# Patient Record
Sex: FEMALE | Race: BLACK OR AFRICAN AMERICAN | NOT HISPANIC OR LATINO | URBAN - METROPOLITAN AREA
[De-identification: names, ages, dates, MRNs, and addresses within clinical notes are randomized per-mention and may not be internally consistent; named-entity substitution may affect disease eponyms.]

---

## 2018-12-13 ENCOUNTER — OFFICE VISIT CONVERTED (OUTPATIENT)
Dept: INTERNAL MEDICINE | Facility: CLINIC | Age: 38
End: 2018-12-13
Attending: NURSE PRACTITIONER

## 2019-02-26 ENCOUNTER — OFFICE VISIT CONVERTED (OUTPATIENT)
Dept: INTERNAL MEDICINE | Facility: CLINIC | Age: 39
End: 2019-02-26
Attending: NURSE PRACTITIONER

## 2019-02-26 ENCOUNTER — CONVERSION ENCOUNTER (OUTPATIENT)
Dept: INTERNAL MEDICINE | Facility: CLINIC | Age: 39
End: 2019-02-26

## 2019-04-11 ENCOUNTER — OFFICE VISIT CONVERTED (OUTPATIENT)
Dept: INTERNAL MEDICINE | Facility: CLINIC | Age: 39
End: 2019-04-11
Attending: NURSE PRACTITIONER

## 2019-04-11 ENCOUNTER — HOSPITAL ENCOUNTER (OUTPATIENT)
Dept: OTHER | Facility: HOSPITAL | Age: 39
Discharge: HOME | End: 2019-04-11
Attending: NURSE PRACTITIONER

## 2019-04-11 LAB
C TRACH RRNA CVX QL NAA+PROBE: NOT DETECTED
N GONORRHOEA DNA SPEC QL NAA+PROBE: NOT DETECTED

## 2019-04-15 LAB
CONV LAST MENSTURAL PERIOD: NORMAL
SPECIMEN SOURCE: NORMAL
SPECIMEN SOURCE: NORMAL
THIN PREP CVX: NORMAL

## 2019-10-07 ENCOUNTER — HOSPITAL ENCOUNTER (OUTPATIENT)
Dept: URGENT CARE | Facility: CLINIC | Age: 39
Discharge: HOME | End: 2019-10-07

## 2019-10-10 ENCOUNTER — OFFICE VISIT CONVERTED (OUTPATIENT)
Dept: INTERNAL MEDICINE | Facility: CLINIC | Age: 39
End: 2019-10-10
Attending: NURSE PRACTITIONER

## 2019-10-24 ENCOUNTER — HOSPITAL ENCOUNTER (OUTPATIENT)
Dept: MAMMOGRAPHY | Facility: HOSPITAL | Age: 39
Discharge: HOME | End: 2019-10-24
Attending: NURSE PRACTITIONER

## 2020-04-08 ENCOUNTER — HOSPITAL ENCOUNTER (OUTPATIENT)
Dept: URGENT CARE | Facility: CLINIC | Age: 40
Discharge: HOME | End: 2020-04-08

## 2020-04-11 LAB — BACTERIA SPEC AEROBE CULT: ABNORMAL

## 2020-04-30 ENCOUNTER — HOSPITAL ENCOUNTER (OUTPATIENT)
Dept: URGENT CARE | Facility: CLINIC | Age: 40
Discharge: HOME | End: 2020-04-30
Attending: NURSE PRACTITIONER

## 2020-06-17 ENCOUNTER — HOSPITAL ENCOUNTER (OUTPATIENT)
Dept: OTHER | Facility: HOSPITAL | Age: 40
Discharge: HOME | End: 2020-06-17

## 2020-06-17 LAB
ALBUMIN SERPL-MCNC: 4.3 G/DL (ref 3.5–5)
ALBUMIN/GLOB SERPL: 1.3 {RATIO} (ref 1.4–2.6)
ALP SERPL-CCNC: 53 U/L (ref 42–98)
ALT SERPL-CCNC: 12 U/L (ref 10–40)
ANION GAP SERPL CALC-SCNC: 14 MMOL/L (ref 8–19)
AST SERPL-CCNC: 17 U/L (ref 15–50)
BASOPHILS # BLD AUTO: 0.04 10*3/UL (ref 0–0.2)
BASOPHILS NFR BLD AUTO: 0.4 % (ref 0–3)
BILIRUB SERPL-MCNC: 0.29 MG/DL (ref 0.2–1.3)
BUN SERPL-MCNC: 9 MG/DL (ref 5–25)
BUN/CREAT SERPL: 10 {RATIO} (ref 6–20)
CALCIUM SERPL-MCNC: 9.4 MG/DL (ref 8.7–10.4)
CHLORIDE SERPL-SCNC: 107 MMOL/L (ref 99–111)
CHOLEST SERPL-MCNC: 140 MG/DL (ref 107–200)
CHOLEST/HDLC SERPL: 2.7 {RATIO} (ref 3–6)
CONV ABS IMM GRAN: 0.02 10*3/UL (ref 0–0.2)
CONV CO2: 22 MMOL/L (ref 22–32)
CONV IMMATURE GRAN: 0.2 % (ref 0–1.8)
CONV TOTAL PROTEIN: 7.7 G/DL (ref 6.3–8.2)
CREAT UR-MCNC: 0.88 MG/DL (ref 0.5–0.9)
DEPRECATED RDW RBC AUTO: 41.7 FL (ref 36.4–46.3)
EOSINOPHIL # BLD AUTO: 0.07 10*3/UL (ref 0–0.7)
EOSINOPHIL # BLD AUTO: 0.8 % (ref 0–7)
ERYTHROCYTE [DISTWIDTH] IN BLOOD BY AUTOMATED COUNT: 12.6 % (ref 11.7–14.4)
GFR SERPLBLD BASED ON 1.73 SQ M-ARVRAT: >60 ML/MIN/{1.73_M2}
GLOBULIN UR ELPH-MCNC: 3.4 G/DL (ref 2–3.5)
GLUCOSE SERPL-MCNC: 80 MG/DL (ref 65–99)
HCT VFR BLD AUTO: 38.5 % (ref 37–47)
HDLC SERPL-MCNC: 52 MG/DL (ref 40–60)
HGB BLD-MCNC: 12.5 G/DL (ref 12–16)
LDLC SERPL CALC-MCNC: 76 MG/DL (ref 70–100)
LITHIUM SERPL-SCNC: 0.3 MEQ/L (ref 0.5–1.5)
LYMPHOCYTES # BLD AUTO: 2.11 10*3/UL (ref 1–5)
LYMPHOCYTES NFR BLD AUTO: 22.8 % (ref 20–45)
MCH RBC QN AUTO: 29.5 PG (ref 27–31)
MCHC RBC AUTO-ENTMCNC: 32.5 G/DL (ref 33–37)
MCV RBC AUTO: 90.8 FL (ref 81–99)
MONOCYTES # BLD AUTO: 0.45 10*3/UL (ref 0.2–1.2)
MONOCYTES NFR BLD AUTO: 4.9 % (ref 3–10)
NEUTROPHILS # BLD AUTO: 6.56 10*3/UL (ref 2–8)
NEUTROPHILS NFR BLD AUTO: 70.9 % (ref 30–85)
NRBC CBCN: 0 % (ref 0–0.7)
OSMOLALITY SERPL CALC.SUM OF ELEC: 286 MOSM/KG (ref 273–304)
PLATELET # BLD AUTO: 260 10*3/UL (ref 130–400)
PMV BLD AUTO: 10.2 FL (ref 9.4–12.3)
POTASSIUM SERPL-SCNC: 3.7 MMOL/L (ref 3.5–5.3)
RBC # BLD AUTO: 4.24 10*6/UL (ref 4.2–5.4)
SODIUM SERPL-SCNC: 139 MMOL/L (ref 135–147)
TRIGL SERPL-MCNC: 61 MG/DL (ref 40–150)
TSH SERPL-ACNC: 1.24 M[IU]/L (ref 0.27–4.2)
VLDLC SERPL-MCNC: 12 MG/DL (ref 5–37)
WBC # BLD AUTO: 9.25 10*3/UL (ref 4.8–10.8)

## 2020-07-02 ENCOUNTER — OFFICE VISIT CONVERTED (OUTPATIENT)
Dept: INTERNAL MEDICINE | Facility: CLINIC | Age: 40
End: 2020-07-02
Attending: PHYSICIAN ASSISTANT

## 2020-07-02 ENCOUNTER — CONVERSION ENCOUNTER (OUTPATIENT)
Dept: INTERNAL MEDICINE | Facility: CLINIC | Age: 40
End: 2020-07-02

## 2020-08-10 ENCOUNTER — HOSPITAL ENCOUNTER (OUTPATIENT)
Dept: OTHER | Facility: HOSPITAL | Age: 40
Discharge: HOME | End: 2020-08-10
Attending: PHYSICIAN ASSISTANT

## 2020-09-15 ENCOUNTER — CONVERSION ENCOUNTER (OUTPATIENT)
Dept: INTERNAL MEDICINE | Facility: CLINIC | Age: 40
End: 2020-09-15

## 2020-09-15 ENCOUNTER — HOSPITAL ENCOUNTER (OUTPATIENT)
Dept: OTHER | Facility: HOSPITAL | Age: 40
Discharge: HOME | End: 2020-09-15
Attending: NURSE PRACTITIONER

## 2020-09-15 ENCOUNTER — OFFICE VISIT CONVERTED (OUTPATIENT)
Dept: INTERNAL MEDICINE | Facility: CLINIC | Age: 40
End: 2020-09-15
Attending: NURSE PRACTITIONER

## 2020-10-09 ENCOUNTER — CONVERSION ENCOUNTER (OUTPATIENT)
Dept: INTERNAL MEDICINE | Facility: CLINIC | Age: 40
End: 2020-10-09

## 2020-10-09 ENCOUNTER — OFFICE VISIT CONVERTED (OUTPATIENT)
Dept: INTERNAL MEDICINE | Facility: CLINIC | Age: 40
End: 2020-10-09
Attending: NURSE PRACTITIONER

## 2020-12-10 ENCOUNTER — HOSPITAL ENCOUNTER (OUTPATIENT)
Dept: URGENT CARE | Facility: CLINIC | Age: 40
Discharge: HOME | End: 2020-12-10
Attending: NURSE PRACTITIONER

## 2020-12-11 ENCOUNTER — HOSPITAL ENCOUNTER (OUTPATIENT)
Dept: MAMMOGRAPHY | Facility: HOSPITAL | Age: 40
Discharge: HOME | End: 2020-12-11
Attending: NURSE PRACTITIONER

## 2021-03-17 ENCOUNTER — HOSPITAL ENCOUNTER (OUTPATIENT)
Dept: OTHER | Facility: HOSPITAL | Age: 41
Discharge: HOME | End: 2021-03-17

## 2021-03-17 LAB
ALBUMIN SERPL-MCNC: 3.9 G/DL (ref 3.5–5)
ALBUMIN/GLOB SERPL: 1.2 {RATIO} (ref 1.4–2.6)
ALP SERPL-CCNC: 53 U/L (ref 42–98)
ALT SERPL-CCNC: 12 U/L (ref 10–40)
ANION GAP SERPL CALC-SCNC: 11 MMOL/L (ref 8–19)
AST SERPL-CCNC: 15 U/L (ref 15–50)
BASOPHILS # BLD AUTO: 0.04 10*3/UL (ref 0–0.2)
BASOPHILS NFR BLD AUTO: 0.6 % (ref 0–3)
BILIRUB SERPL-MCNC: 0.2 MG/DL (ref 0.2–1.3)
BUN SERPL-MCNC: 11 MG/DL (ref 5–25)
BUN/CREAT SERPL: 12 {RATIO} (ref 6–20)
CALCIUM SERPL-MCNC: 9.2 MG/DL (ref 8.7–10.4)
CHLORIDE SERPL-SCNC: 106 MMOL/L (ref 99–111)
CHOLEST SERPL-MCNC: 141 MG/DL (ref 107–200)
CHOLEST/HDLC SERPL: 2.7 {RATIO} (ref 3–6)
CONV ABS IMM GRAN: 0.02 10*3/UL (ref 0–0.2)
CONV CO2: 27 MMOL/L (ref 22–32)
CONV IMMATURE GRAN: 0.3 % (ref 0–1.8)
CONV TOTAL PROTEIN: 7.2 G/DL (ref 6.3–8.2)
CREAT UR-MCNC: 0.89 MG/DL (ref 0.5–0.9)
DEPRECATED RDW RBC AUTO: 43.9 FL (ref 36.4–46.3)
EOSINOPHIL # BLD AUTO: 0.07 10*3/UL (ref 0–0.7)
EOSINOPHIL # BLD AUTO: 1.1 % (ref 0–7)
ERYTHROCYTE [DISTWIDTH] IN BLOOD BY AUTOMATED COUNT: 13.1 % (ref 11.7–14.4)
GFR SERPLBLD BASED ON 1.73 SQ M-ARVRAT: >60 ML/MIN/{1.73_M2}
GLOBULIN UR ELPH-MCNC: 3.3 G/DL (ref 2–3.5)
GLUCOSE SERPL-MCNC: 88 MG/DL (ref 65–99)
HCT VFR BLD AUTO: 39.4 % (ref 37–47)
HDLC SERPL-MCNC: 52 MG/DL (ref 40–60)
HGB BLD-MCNC: 12.6 G/DL (ref 12–16)
LDLC SERPL CALC-MCNC: 72 MG/DL (ref 70–100)
LITHIUM SERPL-SCNC: 0.2 MEQ/L (ref 0.5–1.5)
LYMPHOCYTES # BLD AUTO: 1.98 10*3/UL (ref 1–5)
LYMPHOCYTES NFR BLD AUTO: 30.3 % (ref 20–45)
MCH RBC QN AUTO: 29.6 PG (ref 27–31)
MCHC RBC AUTO-ENTMCNC: 32 G/DL (ref 33–37)
MCV RBC AUTO: 92.5 FL (ref 81–99)
MONOCYTES # BLD AUTO: 0.63 10*3/UL (ref 0.2–1.2)
MONOCYTES NFR BLD AUTO: 9.6 % (ref 3–10)
NEUTROPHILS # BLD AUTO: 3.8 10*3/UL (ref 2–8)
NEUTROPHILS NFR BLD AUTO: 58.1 % (ref 30–85)
NRBC CBCN: 0 % (ref 0–0.7)
OSMOLALITY SERPL CALC.SUM OF ELEC: 289 MOSM/KG (ref 273–304)
PLATELET # BLD AUTO: 269 10*3/UL (ref 130–400)
PMV BLD AUTO: 9.7 FL (ref 9.4–12.3)
POTASSIUM SERPL-SCNC: 3.9 MMOL/L (ref 3.5–5.3)
RBC # BLD AUTO: 4.26 10*6/UL (ref 4.2–5.4)
SODIUM SERPL-SCNC: 140 MMOL/L (ref 135–147)
TRIGL SERPL-MCNC: 83 MG/DL (ref 40–150)
TSH SERPL-ACNC: 1.27 M[IU]/L (ref 0.27–4.2)
VLDLC SERPL-MCNC: 17 MG/DL (ref 5–37)
WBC # BLD AUTO: 6.54 10*3/UL (ref 4.8–10.8)

## 2021-05-13 NOTE — PROGRESS NOTES
Progress Note      Patient Name: Cruz Nichols   Patient ID: [id number]   Sex: Female   YOB: 1980    Primary Care Provider: Ifeoma VALENCIA    Visit Date: July 2, 2020    Provider: Elizabeth Doyle PA-C   Location: [facility]   Location Address: [address] [place] [address], [facility] [address]  [place], [place]  [id number]   Location Phone: [phone]          Chief Complaint  · ER follow up       History Of Present Illness  Cruz Nichols is a 39 year old /Black female who presents for evaluation and treatment of:      Pt here for ER follow up. Pt went to ER 6/30 for abd pain on R side. CT abd/pelvis WNL- showed tubes tied and gallbladder surgically absent. UA showing UTI. WBC 11.3H. DX with pyelonephritis. Rx Keflex, pyridium, Mobic, Zofran.   Denies dysuria, frequency, hematuria.   She is still having pain on R side. It has improved some but still there.   Denies fever, vomiting, diarrhea, constipation.  She has increased water intake.       Past Medical History  Disease Name Date Onset Notes   Allergic rhinitis --  --    Anxiety disorder --  --    Bipolar Disorder --  --    Depressive Disorder --  --    Migraine Headaches --  --    Urge Incontinence --  --    Urinary Incontinence --  --          Past Surgical History  Procedure Name Date Notes   Cholecystectomy --  --    Ovarian cyst drainage --  --    Tubal ligation 2015 --          Medication List  Name Date Started Instructions   bupropion HCl 75 mg oral tablet  take 3 tablets by oral route daily   buspirone 5 mg oral tablet  take 1 tablet (5 mg) by oral route 3 times per day   cephalexin 500 mg oral capsule  take 1 capsule (500 mg) by oral route every 6 hours   lithium carbonate 300 mg oral tablet  take 1 tablet (300 mg) by oral route 2 times per day   meloxicam 7.5 mg oral tablet  take 1 tablet (7.5 mg) by oral route once daily   ondansetron HCl 4 mg oral tablet  take 1 tablet by oral route 4 times per day  "  oxybutynin chloride 10 mg oral tablet extended release 24hr 2020 TAKE 1 TABLET BY MOUTH ONCE DAILY         Allergy List  Allergen Name Date Reaction Notes   Codiene --  --  --        Allergies Reconciled  Reproductive History  Menstrual   Last Menstrual Period: 2013 Method of Birth Control: OCPs   Pregnancy Summary   Total Pregnancies: 2 Full Term: 2 Premature: 0   Ab Induced: 0 Ab Spontaneous: 0 Ectopics: 0   Multiples: 0 Livin         Social History  Finding Status Start/Stop Quantity Notes   Alcohol Current some day --/-- 1 glass wine --    Tobacco Never --/-- --  --          Immunizations  NameDate Admin Mfg Trade Name Lot Number Route Inj VIS Given VIS Publication   Sxjhwmqeh13/08/2014 NOV Fluvirin > 4 Years [id number] IM LD 2014   Comments: pt left office in stable condition         Review of Systems  · Constitutional  o Denies  o : fever, fatigue, weight loss, weight gain  · Cardiovascular  o Denies  o : lower extremity edema, claudication, chest pressure, palpitations  · Respiratory  o Denies  o : shortness of breath, wheezing, frequent cough, hemoptysis, dyspnea on exertion  · Gastrointestinal  o Admits  o : abdominal pain  o Denies  o : nausea, vomiting, diarrhea, constipation  · Genitourinary  o Denies  o : urinary urgency, urinary frequency, dysuria, hematuria      Vitals  Date Time BP Position Site L\R Cuff Size HR RR TEMP (F) WT  HT  BMI kg/m2 BSA m2 O2 Sat        2019 08:38 /72 Sitting    95 - R 12 97.7 200lbs 6oz 5'  11\" 27.95 2.13 99 %    10/10/2019 08:18 /84 Sitting    107 - R 12 97.5 204lbs 8oz 5'  11\" 28.52 2.16 98 %    2020 08:13 /74 Sitting    103 - R 15 98.3 195lbs 16oz 5'  11\" 27.34 2.11 96 %          Physical Examination  · Constitutional  o Appearance  o : no acute distress, well-nourished  · Head and Face  o Head  o :   § Inspection  § : atraumatic, normocephalic  · Eyes  o Eyes  o : extraocular movements intact, no scleral " icterus, no conjunctival injection  · Ears, Nose, Mouth and Throat  o Ears  o :   § External Ears  § : normal  o Nose  o :   § Intranasal Exam  § : nares patent  o Oral Cavity  o :   § Oral Mucosa  § : moist mucous membranes  · Respiratory  o Respiratory Effort  o : breathing comfortably, symmetric chest rise  o Auscultation of Lungs  o : clear to asculatation bilaterally, no wheezes, rales, or rhonchii  · Cardiovascular  o Heart  o :   § Auscultation of Heart  § : regular rate and rhythm, no murmurs, rubs, or gallops  o Peripheral Vascular System  o :   § Extremities  § : no edema  · Gastrointestinal  o Abdominal Examination  o :   § Abdomen  § : bowel sounds present, non-distended, non-tender  · Skin and Subcutaneous Tissue  o General Inspection  o : no lesions present, no areas of discoloration, skin turgor normal  · Neurologic  o Mental Status Examination  o :   § Orientation  § : grossly oriented to person, place and time  o Gait and Station  o :   § Gait Screening  § : normal gait  · Psychiatric  o General  o : normal mood and affect          Assessment  · Pyelonephritis     590.80/N12  reviewed ER note, labs, and ct. Discussed kidney infection. Urine cx showing ecoli with pending sensitivity. Will change Keflex to Bactrim today. Increase water intake. Monitor for worsening sx, profuse vomiting, fever, increased pain. Pt understands and agrees      Plan  · Orders  o ACO-39: Current medications updated and reviewed () - - 07/02/2020  · Medications  o Bactrim -160 mg oral tablet   SIG: take 1 tablet by oral route every 12 hours for 7 days   DISP: (14) tablets with 0 refills  Prescribed on 07/02/2020     o Medications have been Reconciled  o Transition of Care or Provider Policy  · Instructions  o Patient was educated/instructed on their diagnosis, treatment and medications prior to discharge from the clinic today.  · Disposition  o Call or Return if symptoms worsen or persist.  o Keep follow up appt  as scheduled            Electronically Signed by: Elizabeth Doyle PA-C -Author on July 2, 2020 08:28:00 AM  Electronically Co-signed by: Nicolette Tejada MD -Reviewer on July 2, 2020 08:53:02 AM

## 2021-05-13 NOTE — PROGRESS NOTES
Progress Note      Patient Name: Cruz Nichols   Patient ID: [id number]   Sex: Female   YOB: 1980    Primary Care Provider: Ifeoma VALENCIA    Visit Date: September 15, 2020    Provider: ERIK Flores   Location: [facility]   Location Address: [address] [place] [address], [facility] [address]  [place], [place]  [id number]   Location Phone: [phone]          Chief Complaint  · Annual Exam  · PAP exam  · (Health Maintainence Information Reviewed Under Results)      History Of Present Illness  Last PAP Smear: 04/2019.   Date of Last Mammogram: 10/2019.   Date of Last Colonoscopy: n/a   No current complaints.   Cruz Nichols is a 40 year old /Black female who presents for evaluation and treatment of:      She is concerned about having a UTI. she reports urinary frequency x2 days. she has been drinking more water in that time as well. she denies dysuria, flank pain, back pain, fever, chills    No pap concerns.   Mammo 10/19         Past Medical History  Disease Name Date Onset Notes   Allergic rhinitis --  --    Anxiety disorder --  --    Bipolar Disorder --  --    Depressive Disorder --  --    Migraine Headaches --  --    Urge Incontinence --  --    Urinary Incontinence --  --          Past Surgical History  Procedure Name Date Notes   Cholecystectomy --  --    Ovarian cyst drainage --  --    Tubal ligation 2015 --          Medication List  Name Date Started Instructions   Abilify 15 mg oral tablet  take 1 tablet (15 mg) by oral route once daily   bupropion HCl 75 mg oral tablet  take 3 tablets by oral route daily   buspirone 5 mg oral tablet  take 1 tablet (5 mg) by oral route 3 times per day   lithium carbonate 300 mg oral tablet  take 1 tablet (300 mg) by oral route 2 times per day   oxybutynin chloride 10 mg oral tablet extended release 24hr 05/28/2020 TAKE 1 TABLET BY MOUTH ONCE DAILY         Allergy List  Allergen Name Date Reaction Notes   Codiene --  --  --   "      Allergies Reconciled  Reproductive History  Menstrual   Last Menstrual Period: 2013 Method of Birth Control: OCPs   Pregnancy Summary   Total Pregnancies: 2 Full Term: 2 Premature: 0   Ab Induced: 0 Ab Spontaneous: 0 Ectopics: 0   Multiples: 0 Livin         Social History  Finding Status Start/Stop Quantity Notes   Alcohol Current some day --/-- 1 glass wine --    Tobacco Never --/-- --  --          Immunizations  NameDate Admin Mfg Trade Name Lot Number Route Inj VIS Given VIS Publication   Eegcmzvrh23/08/2014 NOV Fluvirin > 4 Years [id number] IM LD 2014   Comments: pt left office in stable condition         Review of Systems  · Constitutional  o Denies  o : appetite change, fatigue, night sweats, weight gain, weight loss  · HENT  o Denies  o : hearing loss, tinnitus, vertigo, nasal discharge, nose bleeding, dental problems, oral lesions, sore throat  · Breasts  o Denies  o : lumps, tenderness, swelling, nipple discharge  · Cardiovascular  o Denies  o : chest pain, decreased exercise tolerance, dyspnea on exertion, palpitations  · Respiratory  o Denies  o : cough, shortness of breath, wheezing, snoring, apneas  · Gastrointestinal  o Denies  o : abdominal pain, nausea, vomiting, dysphagia, heartburn, changes in bowel habits, constipation, diarrhea  · Genitourinary  o Admits  o : frequency  o Denies  o : dysuria, hematuria, incontinence, nocturia, urgency, sexual dysfunction  · Neurologic  o Denies  o : abnormal gait, facial weakness, headache, memory difficulties, tingling or numbness, seizures, tremors  · Psychiatric  o Denies  o : anxiety, decreased concentration, irritability, panic attacks, sleep distrubances, sadness/tearfulness      Vitals  Date Time BP Position Site L\R Cuff Size HR RR TEMP (F) WT  HT  BMI kg/m2 BSA m2 O2 Sat HC       10/10/2019 08:18 /84 Sitting    107 - R 12 97.5 204lbs 8oz 5'  11\" 28.52 2.16 98 %    2020 08:13 /74 Sitting    103 - R 15 98.3 " "[facility] 16oz 5'  11\" 27.34 2.11 96 %    09/15/2020 11:20 /74 Sitting    87 - R 18 98.4 191lbs 4oz 5'  11\" 26.67 2.08 98 %          Physical Examination  · Constitutional  o Appearance  o : well-nourished, in no acute distress  · Neck  o Inspection/Palpation  o : normal appearance, no masses or tenderness, trachea midline  o Thyroid  o : gland size normal, nontender, no nodules or masses present on palpation  · Respiratory  o Respiratory Effort  o : breathing unlabored  o Inspection of Chest  o : normal appearance  o Auscultation of Lungs  o : normal breath sounds throughout  · Cardiovascular  o Heart  o :   § Auscultation of Heart  § : regular rate and rhythm, no murmurs, gallops or rubs  · Breasts  o Inspection of Breasts  o : breasts symmetrical, no skin changes, no deformities present, no discharge present  o Palpation of Breasts, Axillae  o : no masses present on palpation, no breast tenderness  · Gastrointestinal  o Abdominal Examination  o : abdomen nontender to palpation, tone normal without rigidity or guarding, no masses present, normal bowel sounds  · Genitourinary  o External Genitalia  o : no inflammation, no lesions present  o Vagina  o : normal vaginal vault, no discharge present, no inflammatory lesions present, no masses present  o Bladder  o : nontender to palpation  o Cervix  o : appearance healthy, no lesions present, nontender to palpation, no discharges, no bleeding present, normal midline position  o Uterus  o : nontender to palpation, no masses present, position midline/midplane  o Adnexa  o : no tenderness or masses present on bimanual examination  o Anus  o : no inflammation or lesions present  o Perineum  o : perineum within normal limits  · Lymphatic  o Neck  o : no lymphadenopathy present  o Axilla  o : no lymphadenopathy present  o Groin  o : no lymphadenopathy present  · Neurologic  o Mental Status Examination  o :   § Orientation  § : grossly oriented to person, place and " time  o Gait and Station  o : normal gait, able to stand without difficulty  · Psychiatric  o Judgement and Insight  o : judgment and insight intact  o Mood and Affect  o : mood normal, affect appropriate     Integumentsmall circular patch at 6:00 on left breast with scaling it outside border           Results  · In-Office Procedures  o Lab procedure  § IOP - Urinalysis without Microscopy (Clinitek) [facility] (04897)   § Color Ur: Yellow   § Clarity Ur: Clear   § Glucose Ur Ql Strip: Negative   § Bilirub Ur Ql Strip: Negative   § Ketones Ur Ql Strip: Negative   § Sp Gr Ur Qn: 1.015   § Hgb Ur Ql Strip: Negative   § pH Ur-LsCnc: 8.5   § Prot Ur Ql Strip: Negative   § Urobilinogen Ur Strip-mCnc: 0.2 E.U./dL   § Nitrite Ur Ql Strip: Negative   § WBC Est Ur Ql Strip: Negative       Assessment  · Routine gynecological examination     V72.31/Z01.419  · Visit for screening mammogram     V76.12/Z12.31  · Tinea corporis     110.5/B35.4  We will treat with clotrimazole topically x10 to 14 days. Patient will call if this is not improving or does not resolve    Problems Reconciled  Plan  · Orders  o Screening Mammography; Bilateral 2D (, 90239) - V76.12/Z12.31 - 10/26/2020  o ACO-39: Current medications updated and reviewed () - - 09/15/2020  o Thin prep Papanicolaou smear (52309) - V72.31/Z01.419 - 09/15/2020  · Medications  o clotrimazole 1 % topical cream   SIG: apply to the affected and surrounding areas of skin by topical route 2 times per day in the morning and evening for 14 days   DISP: (1) 14 gm tube with 0 refills  Prescribed on 09/15/2020     o Medications have been Reconciled  o Transition of Care or Provider Policy  · Instructions  o Counseled on monthly breast self exams.   o Counseled on STD prevention.  o Counseled on diet and exercise.   o Counseled on weight-bearing exercise.  o Recommended Calcium with Vitamin D twice daily.  o Used cytobrush to obtain Pap smear specimen. Sent to pathology for testing and  review.  o Patient was educated/instructed on their diagnosis, treatment and medications prior to discharge from the clinic today.  · Disposition  o Call or Return if symptoms worsen or persist.  o Prescriptions sent electronically  o Prescription printed and given to patient            Electronically Signed by: ERIK Flores -Author on September 15, 2020 12:20:03 PM
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
## 2021-05-14 VITALS
HEIGHT: 71 IN | DIASTOLIC BLOOD PRESSURE: 74 MMHG | RESPIRATION RATE: 18 BRPM | OXYGEN SATURATION: 98 % | TEMPERATURE: 98.4 F | WEIGHT: 191.25 LBS | SYSTOLIC BLOOD PRESSURE: 118 MMHG | HEART RATE: 87 BPM | BODY MASS INDEX: 26.77 KG/M2

## 2021-05-14 VITALS
HEART RATE: 90 BPM | RESPIRATION RATE: 18 BRPM | TEMPERATURE: 98.7 F | HEIGHT: 71 IN | OXYGEN SATURATION: 97 % | DIASTOLIC BLOOD PRESSURE: 66 MMHG | WEIGHT: 191.25 LBS | SYSTOLIC BLOOD PRESSURE: 102 MMHG | BODY MASS INDEX: 26.77 KG/M2

## 2021-05-15 VITALS
SYSTOLIC BLOOD PRESSURE: 108 MMHG | RESPIRATION RATE: 12 BRPM | WEIGHT: 200.37 LBS | BODY MASS INDEX: 28.05 KG/M2 | TEMPERATURE: 97.7 F | HEART RATE: 95 BPM | HEIGHT: 71 IN | OXYGEN SATURATION: 99 % | DIASTOLIC BLOOD PRESSURE: 72 MMHG

## 2021-05-15 VITALS
HEIGHT: 71 IN | OXYGEN SATURATION: 96 % | TEMPERATURE: 98.3 F | RESPIRATION RATE: 15 BRPM | HEART RATE: 103 BPM | BODY MASS INDEX: 27.44 KG/M2 | SYSTOLIC BLOOD PRESSURE: 110 MMHG | DIASTOLIC BLOOD PRESSURE: 74 MMHG | WEIGHT: 196 LBS

## 2021-05-15 VITALS
DIASTOLIC BLOOD PRESSURE: 84 MMHG | BODY MASS INDEX: 28.63 KG/M2 | HEART RATE: 107 BPM | OXYGEN SATURATION: 98 % | RESPIRATION RATE: 12 BRPM | SYSTOLIC BLOOD PRESSURE: 122 MMHG | WEIGHT: 204.5 LBS | HEIGHT: 71 IN | TEMPERATURE: 97.5 F

## 2021-05-16 VITALS
DIASTOLIC BLOOD PRESSURE: 78 MMHG | BODY MASS INDEX: 27.09 KG/M2 | HEART RATE: 74 BPM | RESPIRATION RATE: 12 BRPM | OXYGEN SATURATION: 99 % | HEIGHT: 71 IN | TEMPERATURE: 97.3 F | SYSTOLIC BLOOD PRESSURE: 136 MMHG | WEIGHT: 193.5 LBS

## 2021-05-16 VITALS
OXYGEN SATURATION: 100 % | HEART RATE: 82 BPM | SYSTOLIC BLOOD PRESSURE: 118 MMHG | WEIGHT: 192 LBS | TEMPERATURE: 98.1 F | BODY MASS INDEX: 26.88 KG/M2 | DIASTOLIC BLOOD PRESSURE: 62 MMHG | RESPIRATION RATE: 15 BRPM | HEIGHT: 71 IN

## 2021-06-21 RX ORDER — BUSPIRONE HYDROCHLORIDE 5 MG/1
1 TABLET ORAL 3 TIMES DAILY PRN
COMMUNITY
End: 2021-10-11

## 2021-06-21 RX ORDER — LITHIUM CARBONATE 300 MG
1 TABLET ORAL 2 TIMES DAILY
COMMUNITY
End: 2021-10-11

## 2021-06-21 RX ORDER — OXYBUTYNIN CHLORIDE 10 MG/1
TABLET, EXTENDED RELEASE ORAL
Qty: 90 TABLET | Refills: 1 | Status: SHIPPED | OUTPATIENT
Start: 2021-06-21 | End: 2021-12-06

## 2021-06-21 RX ORDER — OXYBUTYNIN CHLORIDE 10 MG/1
1 TABLET, EXTENDED RELEASE ORAL DAILY
COMMUNITY
Start: 2020-12-10 | End: 2021-07-06 | Stop reason: SDUPTHER

## 2021-06-21 RX ORDER — BUPROPION HYDROCHLORIDE 75 MG/1
3 TABLET ORAL DAILY
COMMUNITY
End: 2021-10-11

## 2021-06-21 RX ORDER — ARIPIPRAZOLE 15 MG/1
1 TABLET ORAL DAILY
COMMUNITY
End: 2021-09-22

## 2021-07-06 ENCOUNTER — HOSPITAL ENCOUNTER (EMERGENCY)
Facility: HOSPITAL | Age: 41
Discharge: HOME | End: 2021-07-06
Attending: EMERGENCY MEDICINE | Admitting: EMERGENCY MEDICINE

## 2021-07-06 VITALS
RESPIRATION RATE: 16 BRPM | HEART RATE: 70 BPM | OXYGEN SATURATION: 98 % | HEIGHT: 71 IN | DIASTOLIC BLOOD PRESSURE: 72 MMHG | WEIGHT: 195.11 LBS | SYSTOLIC BLOOD PRESSURE: 109 MMHG | BODY MASS INDEX: 27.31 KG/M2 | TEMPERATURE: 98.1 F

## 2021-07-06 DIAGNOSIS — M54.50 ACUTE RIGHT-SIDED LOW BACK PAIN WITHOUT SCIATICA: Primary | ICD-10-CM

## 2021-07-06 DIAGNOSIS — S39.012A STRAIN OF LUMBAR REGION, INITIAL ENCOUNTER: ICD-10-CM

## 2021-07-06 PROCEDURE — 25010000002 KETOROLAC TROMETHAMINE PER 15 MG: Performed by: NURSE PRACTITIONER

## 2021-07-06 PROCEDURE — 96372 THER/PROPH/DIAG INJ SC/IM: CPT

## 2021-07-06 PROCEDURE — 99282 EMERGENCY DEPT VISIT SF MDM: CPT

## 2021-07-06 RX ORDER — IBUPROFEN 800 MG/1
800 TABLET ORAL 3 TIMES DAILY PRN
Qty: 20 TABLET | Refills: 0 | Status: SHIPPED | OUTPATIENT
Start: 2021-07-06 | End: 2021-10-11

## 2021-07-06 RX ORDER — CYCLOBENZAPRINE HCL 10 MG
10 TABLET ORAL 3 TIMES DAILY PRN
Qty: 12 TABLET | Refills: 0 | Status: SHIPPED | OUTPATIENT
Start: 2021-07-06 | End: 2021-09-22

## 2021-07-06 RX ORDER — KETOROLAC TROMETHAMINE 30 MG/ML
30 INJECTION, SOLUTION INTRAMUSCULAR; INTRAVENOUS EVERY 6 HOURS PRN
Status: DISCONTINUED | OUTPATIENT
Start: 2021-07-06 | End: 2021-07-06 | Stop reason: HOSPADM

## 2021-07-06 RX ADMIN — KETOROLAC TROMETHAMINE 30 MG: 30 INJECTION, SOLUTION INTRAMUSCULAR; INTRAVENOUS at 05:03

## 2021-07-06 NOTE — ED PROVIDER NOTES
Subjective   Pt reports she was at work tonight when she lifted a box and felt pain in her right lower back.       History provided by:  Patient  Back Pain  Location:  Lumbar spine  Quality:  Aching  Radiates to:  Does not radiate  Pain severity:  Moderate  Pain is:  Unable to specify  Onset quality:  Sudden  Duration:  2 hours  Timing:  Constant  Progression:  Unchanged  Chronicity:  New  Context: lifting heavy objects, occupational injury and twisting    Relieved by:  Nothing  Worsened by:  Movement  Ineffective treatments:  None tried  Associated symptoms: no abdominal pain, no bladder incontinence, no bowel incontinence, no chest pain, no fever, no headaches, no numbness, no paresthesias and no perianal numbness        Review of Systems   Constitutional: Negative for chills and fever.   HENT: Negative for congestion, ear pain and sore throat.    Eyes: Negative for pain.   Respiratory: Negative for cough, chest tightness and shortness of breath.    Cardiovascular: Negative for chest pain.   Gastrointestinal: Negative for abdominal pain, bowel incontinence, diarrhea, nausea and vomiting.   Genitourinary: Negative for bladder incontinence, flank pain and hematuria.   Musculoskeletal: Positive for back pain. Negative for joint swelling.   Skin: Negative for pallor.   Neurological: Negative for seizures, numbness, headaches and paresthesias.   All other systems reviewed and are negative.      Past Medical History:   Diagnosis Date   • Anxiety    • Bipolar disorder (CMS/HCC)    • Depression    • PTSD (post-traumatic stress disorder)        Allergies   Allergen Reactions   • Codeine Seizure       Past Surgical History:   Procedure Laterality Date   • CHOLECYSTECTOMY     • CYST REMOVAL     • TUBAL ABDOMINAL LIGATION         History reviewed. No pertinent family history.    Social History     Socioeconomic History   • Marital status:      Spouse name: Not on file   • Number of children: Not on file   • Years of  education: Not on file   • Highest education level: Not on file   Tobacco Use   • Smoking status: Never Smoker   • Smokeless tobacco: Never Used   Vaping Use   • Vaping Use: Never used   Substance and Sexual Activity   • Alcohol use: Yes     Comment: rare   • Drug use: Never   • Sexual activity: Defer           Objective   Physical Exam  Vitals and nursing note reviewed.   Constitutional:       General: She is not in acute distress.     Appearance: Normal appearance. She is not toxic-appearing.   HENT:      Head: Normocephalic and atraumatic.      Mouth/Throat:      Mouth: Mucous membranes are moist.   Eyes:      Extraocular Movements: Extraocular movements intact.      Pupils: Pupils are equal, round, and reactive to light.   Cardiovascular:      Rate and Rhythm: Normal rate and regular rhythm.      Pulses: Normal pulses.      Heart sounds: Normal heart sounds.   Pulmonary:      Effort: Pulmonary effort is normal. No respiratory distress.      Breath sounds: Normal breath sounds.   Abdominal:      General: Abdomen is flat.      Palpations: Abdomen is soft.      Tenderness: There is no abdominal tenderness.   Musculoskeletal:         General: Normal range of motion.      Cervical back: Normal range of motion and neck supple.   Skin:     General: Skin is warm and dry.   Neurological:      Mental Status: She is alert and oriented to person, place, and time. Mental status is at baseline.         Procedures           ED Course                                           MDM  Number of Diagnoses or Management Options  Acute right-sided low back pain without sciatica: new and requires workup  Strain of lumbar region, initial encounter: new and requires workup  Risk of Complications, Morbidity, and/or Mortality  Presenting problems: minimal  Diagnostic procedures: minimal  Management options: minimal    Patient Progress  Patient progress: stable      Final diagnoses:   Acute right-sided low back pain without sciatica    Strain of lumbar region, initial encounter       ED Disposition  ED Disposition     ED Disposition Condition Comment    Discharge Stable           Elizabeth Dyole, BRYON  [address] [address] [phone]    In 2 days  As needed         Medication List      New Prescriptions    cyclobenzaprine 10 MG tablet  Commonly known as: FLEXERIL  Take 1 tablet by mouth 3 (Three) Times a Day As Needed for Muscle Spasms.     ibuprofen 800 MG tablet  Commonly known as: ADVIL,MOTRIN  Take 1 tablet by mouth 3 (Three) Times a Day As Needed for Mild Pain .           Where to Get Your Medications      These medications were sent to [facility]/pharmacy #[id number] - [place], [place] - [address] - [phone]  - [phone] FX  [address] [place] [address]    Hours: 24-hours Phone: [phone]   · cyclobenzaprine 10 MG tablet  · ibuprofen 800 MG tablet          Cong Chang, ERIK  07/06/21 8745

## 2021-09-01 ENCOUNTER — HOSPITAL ENCOUNTER (EMERGENCY)
Facility: HOSPITAL | Age: 41
Discharge: HOME | End: 2021-09-01
Attending: EMERGENCY MEDICINE | Admitting: EMERGENCY MEDICINE

## 2021-09-01 VITALS
HEIGHT: 71 IN | HEART RATE: 100 BPM | BODY MASS INDEX: 26.91 KG/M2 | SYSTOLIC BLOOD PRESSURE: 127 MMHG | WEIGHT: 192.24 LBS | RESPIRATION RATE: 16 BRPM | OXYGEN SATURATION: 99 % | TEMPERATURE: 99.3 F | DIASTOLIC BLOOD PRESSURE: 92 MMHG

## 2021-09-01 DIAGNOSIS — Z20.822 EXPOSURE TO COVID-19 VIRUS: Primary | ICD-10-CM

## 2021-09-01 PROCEDURE — C9803 HOPD COVID-19 SPEC COLLECT: HCPCS

## 2021-09-01 PROCEDURE — 87635 SARS-COV-2 COVID-19 AMP PRB: CPT | Performed by: EMERGENCY MEDICINE

## 2021-09-01 PROCEDURE — 99283 EMERGENCY DEPT VISIT LOW MDM: CPT

## 2021-09-01 NOTE — DISCHARGE INSTRUCTIONS
Your COVID test is pending, it takes 48-72 hours for this test to result. You will be called if this test is positive. You can also check for results on MyChart. Return to the ER for severe shortness of breath, inability to keep fluids down or any concerns. Alternate Tylenol and Ibuprofen per label instructions for fever and/or aches and pains. Quarantine per CDC guidelines.

## 2021-09-01 NOTE — ED PROVIDER NOTES
Time: 1:02 PM EDT  Arrived by: private vehicle  Chief Complaint: exposure to COVID  History provided by: patient  History is limited by: N/A     History of Present Illness:  Patient is a 41 y.o. year old female that presents to the emergency department with possible exposure to COVID and requests testing. She reports several coworkers are out with possible COVID that she works near/in close proximity. She denies any current symptoms.     HPI          Patient Care Team  Primary Care Provider: Elizabeth Doyle PA-C    Past Medical History:     Allergies   Allergen Reactions   • Codeine Seizure     Past Medical History:   Diagnosis Date   • Anxiety    • Bipolar disorder (CMS/HCC)    • Depression    • PTSD (post-traumatic stress disorder)      Past Surgical History:   Procedure Laterality Date   • CHOLECYSTECTOMY     • CYST REMOVAL     • TUBAL ABDOMINAL LIGATION       No family history on file.    Home Medications:  Prior to Admission medications    Medication Sig Start Date End Date Taking? Authorizing Provider   ARIPiprazole (Abilify) 15 MG tablet Take 1 tablet by mouth Daily.    ProviderLea MD   buPROPion (WELLBUTRIN) 75 MG tablet Take 3 tablets by mouth Daily.    ProviderLea MD   busPIRone (BUSPAR) 5 MG tablet Take 1 tablet by mouth 3 (Three) Times a Day As Needed.    ProviderLea MD   cyclobenzaprine (FLEXERIL) 10 MG tablet Take 1 tablet by mouth 3 (Three) Times a Day As Needed for Muscle Spasms. 7/6/21   Cong Chang APRN   ibuprofen (ADVIL,MOTRIN) 800 MG tablet Take 1 tablet by mouth 3 (Three) Times a Day As Needed for Mild Pain . 7/6/21   Cong Chang APRN   lithium 300 MG tablet Take 1 tablet by mouth 2 (two) times a day.    ProviderLea MD   oxybutynin XL (DITROPAN-XL) 10 MG 24 hr tablet TAKE 1 TABLET BY MOUTH EVERY DAY 6/21/21   Nicolette Tejada MD        Social History:   Social History     Tobacco Use   • Smoking status: Never Smoker   •  "Smokeless tobacco: Never Used   Vaping Use   • Vaping Use: Never used   Substance Use Topics   • Alcohol use: Yes     Comment: rare   • Drug use: Never     Recent travel: no    Review of Systems:  Review of Systems   Constitutional: Negative for chills and fever.   HENT: Negative for congestion, ear pain and sore throat.    Eyes: Negative for pain.   Respiratory: Negative for cough, chest tightness and shortness of breath.    Cardiovascular: Negative for chest pain.   Gastrointestinal: Negative for abdominal pain, diarrhea, nausea and vomiting.   Genitourinary: Negative for flank pain and hematuria.   Musculoskeletal: Negative for joint swelling.   Skin: Negative for pallor.   Neurological: Negative for seizures and headaches.   All other systems reviewed and are negative.       Physical Exam:  /92 (BP Location: Right arm, Patient Position: Sitting)   Pulse 100   Temp 99.3 °F (37.4 °C) (Oral)   Resp 16   Ht 180.3 cm (71\")   Wt 87.2 kg (192 lb 3.9 oz)   LMP 08/29/2021   SpO2 99%   BMI 26.81 kg/m²     Physical Exam  Vitals and nursing note reviewed.   Constitutional:       General: She is not in acute distress.     Appearance: Normal appearance. She is not toxic-appearing.   HENT:      Head: Normocephalic and atraumatic.      Mouth/Throat:      Mouth: Mucous membranes are moist.   Eyes:      Extraocular Movements: Extraocular movements intact.      Pupils: Pupils are equal, round, and reactive to light.   Cardiovascular:      Rate and Rhythm: Normal rate and regular rhythm.      Pulses: Normal pulses.      Heart sounds: Normal heart sounds.   Pulmonary:      Effort: Pulmonary effort is normal. No respiratory distress.      Breath sounds: Normal breath sounds.   Abdominal:      General: Abdomen is flat.      Palpations: Abdomen is soft.      Tenderness: There is no abdominal tenderness.   Musculoskeletal:         General: Normal range of motion.      Cervical back: Normal range of motion and neck supple. "   Skin:     General: Skin is warm and dry.   Neurological:      Mental Status: She is alert and oriented to person, place, and time. Mental status is at baseline.                Medications in the Emergency Department:  Medications - No data to display     Labs  Lab Results (last 24 hours)     ** No results found for the last 24 hours. **           Imaging:  No Radiology Exams Resulted Within Past 24 Hours    Procedures:  Procedures    Progress                            Medical Decision Making:   The patient is well-appearing and in no respiratory distress.  The patient was tested for COVID-19 in the emergency department and is currently awaiting results.  The patient has a normal mental status and is neurologically intact. The patient appears well and is able to tolerate food for fluid by mouth and there's no significant dehydration. There is no respiratory distress and no signs of systemic toxicity. The history, exam, diagnostic testing and current condition do not demonstrate an infectious process such as meningitis, retropharyngeal abscess, epiglottitis, sepsis or other serious bacterial infection requiring further testing, treatment, consultation, or admission at this time. The patient has no additional oxygen requirement. The patient has a chest x-ray that is suggestive of pneumonia. The patient has a PORT score that is suitable for outpatient treatment.  The patient has no respiratory distress, or hypoxia.  The vital signs have been stable. The patient's condition is stable and appropriate for discharge. The possible party was advised to return for worsening fever, respiratory distress, intractable vomiting, weakness, shortness of breath, chest pain, or altered mental status. The responsible party will pursue further outpatient evaluation with the primary care physician. The responsible party has expressed a clear and thorough understanding and agreed to follow-up as instructed.    Final diagnoses:    Exposure to COVID-19 virus        Disposition:  ED Disposition     ED Disposition Condition Comment    Discharge Stable                Cong Chang, ERIK  09/01/21 1336       Cong Chang, APRAMOS  09/11/21 1012

## 2021-09-02 LAB — SARS-COV-2 N GENE RESP QL NAA+PROBE: NOT DETECTED

## 2021-09-22 ENCOUNTER — OFFICE VISIT (OUTPATIENT)
Dept: INTERNAL MEDICINE | Facility: CLINIC | Age: 41
End: 2021-09-22

## 2021-09-22 VITALS
WEIGHT: 196.4 LBS | DIASTOLIC BLOOD PRESSURE: 78 MMHG | BODY MASS INDEX: 27.5 KG/M2 | TEMPERATURE: 97.8 F | SYSTOLIC BLOOD PRESSURE: 104 MMHG | HEIGHT: 71 IN | OXYGEN SATURATION: 98 % | HEART RATE: 86 BPM

## 2021-09-22 DIAGNOSIS — F31.31 BIPOLAR AFFECTIVE DISORDER, CURRENTLY DEPRESSED, MILD (HCC): ICD-10-CM

## 2021-09-22 DIAGNOSIS — F41.1 GENERALIZED ANXIETY DISORDER: ICD-10-CM

## 2021-09-22 DIAGNOSIS — R30.0 DYSURIA: Primary | ICD-10-CM

## 2021-09-22 DIAGNOSIS — F43.10 PTSD (POST-TRAUMATIC STRESS DISORDER): ICD-10-CM

## 2021-09-22 DIAGNOSIS — M54.50 LOW BACK PAIN WITHOUT SCIATICA, UNSPECIFIED BACK PAIN LATERALITY, UNSPECIFIED CHRONICITY: ICD-10-CM

## 2021-09-22 DIAGNOSIS — F33.1 MAJOR DEPRESSIVE DISORDER, RECURRENT EPISODE, MODERATE DEGREE (HCC): ICD-10-CM

## 2021-09-22 DIAGNOSIS — R31.9 HEMATURIA, UNSPECIFIED TYPE: ICD-10-CM

## 2021-09-22 LAB
BILIRUB UR QL STRIP: NEGATIVE
CLARITY UR: ABNORMAL
COLOR UR: ABNORMAL
GLUCOSE UR STRIP-MCNC: NEGATIVE MG/DL
HGB UR QL STRIP.AUTO: ABNORMAL
KETONES UR QL STRIP: NEGATIVE
LEUKOCYTE ESTERASE UR QL STRIP.AUTO: ABNORMAL
NITRITE UR QL STRIP: NEGATIVE
PH UR STRIP.AUTO: 7 [PH] (ref 5–8)
PROT UR QL STRIP: NEGATIVE
SP GR UR STRIP: 1.02 (ref 1–1.03)
UROBILINOGEN UR QL STRIP: ABNORMAL

## 2021-09-22 PROCEDURE — 81003 URINALYSIS AUTO W/O SCOPE: CPT | Performed by: PHYSICIAN ASSISTANT

## 2021-09-22 PROCEDURE — 99213 OFFICE O/P EST LOW 20 MIN: CPT | Performed by: PHYSICIAN ASSISTANT

## 2021-09-22 RX ORDER — NITROFURANTOIN 25; 75 MG/1; MG/1
100 CAPSULE ORAL 2 TIMES DAILY
Qty: 14 CAPSULE | Refills: 0 | Status: SHIPPED | OUTPATIENT
Start: 2021-09-22 | End: 2021-09-24

## 2021-09-22 NOTE — PROGRESS NOTES
Chief Complaint  UTI    Subjective          Cruz Nichols presents to [facility]  Frequency, dyrusia, pain in lower back x 1 wk  She took an OTC urine test and it showed a UTI. She started otc azo which helped slightly but did not resolve sx.  She has been having nausea. Denies vomiting   Denies fever, abd pain  LMP: 9/1  She has been drinking a lot of soda    Anxiety, bipolar, depression, ptsd: mood has been doing well.   Seeing psych for meds  Denies si/hi  She feels down today because she lost custody of her younger daughter yesterday in court        Past Medical History:   Diagnosis Date   • Anxiety    • Bipolar disorder (CMS/HCC)    • Depression    • PTSD (post-traumatic stress disorder)         Past Surgical History:   Procedure Laterality Date   • CHOLECYSTECTOMY     • CYST REMOVAL     • TUBAL ABDOMINAL LIGATION          Current Outpatient Medications on File Prior to Visit   Medication Sig Dispense Refill   • buPROPion (WELLBUTRIN) 75 MG tablet Take 3 tablets by mouth Daily.     • busPIRone (BUSPAR) 5 MG tablet Take 1 tablet by mouth 3 (Three) Times a Day As Needed.     • ibuprofen (ADVIL,MOTRIN) 800 MG tablet Take 1 tablet by mouth 3 (Three) Times a Day As Needed for Mild Pain . 20 tablet 0   • lithium 300 MG tablet Take 1 tablet by mouth 2 (two) times a day.     • oxybutynin XL (DITROPAN-XL) 10 MG 24 hr tablet TAKE 1 TABLET BY MOUTH EVERY DAY 90 tablet 1   • [DISCONTINUED] ARIPiprazole (Abilify) 15 MG tablet Take 1 tablet by mouth Daily.     • [DISCONTINUED] cyclobenzaprine (FLEXERIL) 10 MG tablet Take 1 tablet by mouth 3 (Three) Times a Day As Needed for Muscle Spasms. 12 tablet 0     No current facility-administered medications on file prior to visit.        Allergies   Allergen Reactions   • Codeine Seizure       Social History     Tobacco Use   Smoking Status Never Smoker   Smokeless Tobacco Never Used          Objective   Vital Signs:   BP  "104/78 (BP Location: Left arm, Patient Position: Sitting, Cuff Size: Adult)   Pulse 86   Temp 97.8 °F (36.6 °C) (Temporal)   Ht 180.3 cm (71\")   Wt 89.1 kg (196 lb 6.4 oz)   SpO2 98%   BMI 27.39 kg/m²     Physical Exam  Vitals reviewed.   Constitutional:       Appearance: Normal appearance.   HENT:      Head: Normocephalic and atraumatic.      Nose: Nose normal.      Mouth/Throat:      Mouth: Mucous membranes are moist.   Eyes:      Extraocular Movements: Extraocular movements intact.      Conjunctiva/sclera: Conjunctivae normal.      Pupils: Pupils are equal, round, and reactive to light.   Cardiovascular:      Rate and Rhythm: Normal rate and regular rhythm.   Pulmonary:      Effort: Pulmonary effort is normal.      Breath sounds: Normal breath sounds.   Abdominal:      General: Abdomen is flat. Bowel sounds are normal.      Palpations: Abdomen is soft.   Musculoskeletal:         General: Normal range of motion.   Neurological:      General: No focal deficit present.      Mental Status: She is alert and oriented to person, place, and time.   Psychiatric:         Mood and Affect: Mood normal.        Result Review :                 Assessment and Plan    Diagnoses and all orders for this visit:    1. Dysuria (Primary)  Assessment & Plan:  Discussed urinary tract infection. Increase water intake. Antibiotic sent today as well as urine culture. We will adjust medications if needed based on culture results. Monitor for worsening symptoms, fever, profuse vomiting, abdominal or back pain. Patient understands and agrees with plan.       2. Low back pain without sciatica, unspecified back pain laterality, unspecified chronicity  -     Urinalysis With Culture If Indicated - Urine, Clean Catch  -     Urinalysis, Microscopic Only - Urine, Clean Catch  -     Urine Culture - Urine, Urine, Clean Catch    3. Hematuria, unspecified type  Comments:  Culture and microscopy sent. If no uti, pt will increase water intake and rtc " in 1 wk to repeat UA    4. Generalized anxiety disorder    5. Major depressive disorder, recurrent episode, moderate degree (HCC)    6. PTSD (post-traumatic stress disorder)    7. Bipolar affective disorder, currently depressed, mild (HCC)  Comments:  Stable, cont follow up with psych    Other orders  -     nitrofurantoin, macrocrystal-monohydrate, (Macrobid) 100 MG capsule; Take 1 capsule by mouth 2 (Two) Times a Day for 7 days.  Dispense: 14 capsule; Refill: 0      Follow Up   Return in about 6 months (around 3/22/2022) for Next scheduled follow up.  Patient was given instructions and counseling regarding her condition or for health maintenance advice. Please see specific information pulled into the AVS if appropriate.

## 2021-09-22 NOTE — ASSESSMENT & PLAN NOTE
Discussed urinary tract infection. Increase water intake. Antibiotic sent today as well as urine culture. We will adjust medications if needed based on culture results. Monitor for worsening symptoms, fever, profuse vomiting, abdominal or back pain. Patient understands and agrees with plan.

## 2021-09-24 LAB — BACTERIA SPEC AEROBE CULT: ABNORMAL

## 2021-09-24 PROCEDURE — 87635 SARS-COV-2 COVID-19 AMP PRB: CPT | Performed by: FAMILY MEDICINE

## 2021-09-24 RX ORDER — SULFAMETHOXAZOLE AND TRIMETHOPRIM 800; 160 MG/1; MG/1
1 TABLET ORAL 2 TIMES DAILY
Qty: 14 TABLET | Refills: 0 | Status: SHIPPED | OUTPATIENT
Start: 2021-09-24 | End: 2021-10-01

## 2021-10-11 ENCOUNTER — OFFICE VISIT (OUTPATIENT)
Dept: INTERNAL MEDICINE | Facility: CLINIC | Age: 41
End: 2021-10-11

## 2021-10-11 VITALS
HEART RATE: 90 BPM | DIASTOLIC BLOOD PRESSURE: 80 MMHG | OXYGEN SATURATION: 99 % | WEIGHT: 194 LBS | TEMPERATURE: 96.1 F | HEIGHT: 71 IN | RESPIRATION RATE: 16 BRPM | SYSTOLIC BLOOD PRESSURE: 120 MMHG | BODY MASS INDEX: 27.16 KG/M2

## 2021-10-11 DIAGNOSIS — Z12.31 SCREENING MAMMOGRAM FOR BREAST CANCER: ICD-10-CM

## 2021-10-11 DIAGNOSIS — F60.3 BORDERLINE PERSONALITY DISORDER IN ADULT (HCC): Chronic | ICD-10-CM

## 2021-10-11 DIAGNOSIS — F43.10 PTSD (POST-TRAUMATIC STRESS DISORDER): Primary | ICD-10-CM

## 2021-10-11 DIAGNOSIS — F33.1 MAJOR DEPRESSIVE DISORDER, RECURRENT EPISODE, MODERATE DEGREE (HCC): ICD-10-CM

## 2021-10-11 PROBLEM — F31.31 BIPOLAR AFFECTIVE DISORDER, CURRENTLY DEPRESSED, MILD: Status: RESOLVED | Noted: 2021-09-22 | Resolved: 2021-10-11

## 2021-10-11 PROBLEM — F41.1 GENERALIZED ANXIETY DISORDER: Status: RESOLVED | Noted: 2021-09-22 | Resolved: 2021-10-11

## 2021-10-11 PROCEDURE — 99214 OFFICE O/P EST MOD 30 MIN: CPT | Performed by: NURSE PRACTITIONER

## 2021-10-11 NOTE — PATIENT INSTRUCTIONS
Borderline Personality Disorder, Adult  Borderline personality disorder (BPD) is a mental health disorder. People with this condition:  · Have unstable moods and relationships.  · Have trouble controlling emotions.  · Often engage in impulsive or reckless behavior.  · Often fear being abandoned by friends or family.  People with BPD often have other mental health issues, such as depression, an anxiety disorder, a substance abuse disorder, or an eating disorder. They may develop suicidal thoughts or behaviors.  What are the causes?  The exact cause of this condition is not known. Possible causes may include:  · Genetic factors. These are traits that are passed down from one generation to the next. Many people with BPD have a family history of the disorder.  · Physical factors. The part of the brain that controls emotion may be different in people who have this condition.  · Social factors. Traumatic experiences involving other people may play a role in the development of BPD. These may include child abuse or neglect.  What increases the risk?  This condition may be more likely to develop in people who:  · Have a parent or close relative with the condition.  · Experienced physical or sexual abuse as a child.  · Experienced neglect or were  from their parents as a child.  · Have unstable family relationships or an unstable home.  What are the signs or symptoms?  Symptoms of this condition usually start during the teen years or in early adulthood. Symptoms include:  · Extreme overreactions to the possibility of being abandoned by family or friends. This may include explosive responses to seemingly minor events, such as a change of plans.  · Volatile relationships with friends and relatives. This may include extreme swings from feelings of love to intense anger.  · Distorted or unstable self-image. This may affect mood, relationships, and future goals or plans.  · Reckless or impulsive behaviors, such as  shopping sprees, risky sexual behavior, substance abuse, or overeating.  · Self-harm, such as cutting.  · Expressing thoughts of suicide.  · Extreme mood swings that can last for hours or days.  · Constant boredom.  · Problems controlling anger. This can result in shame or guilt.  · Paranoid thoughts.  · Losing touch with reality, often in order to help deal with unbearable situations (dissociation).  How is this diagnosed?  This condition is diagnosed based on the person's symptoms. Information about the person's symptoms is gathered from family, friends, and medical and legal professionals. A health care provider who specializes in physical and psychological causes of mental conditions (psychiatrist) will do a psychiatric evaluation. The person will be diagnosed with BPD if she or he has at least five common symptoms of the condition.  How is this treated?  This condition is usually treated by mental health professionals, such as psychologists, psychiatrists, and clinical social workers. More than one type of treatment may be needed. Treatment may include therapy such as:  · Psychotherapy. This may also be called talk therapy or counseling.  · Cognitive behavioral therapy (CBT). This helps the person to recognize and change unhealthy feelings, thoughts, and behaviors. It helps him or her find new, more positive thoughts and actions to replace the old ones.  · Dialectical behavioral therapy (DBT). Through this type of treatment, a person learns to understand his or her feelings and to regulate them. This may be one-on-one treatment or part of group therapy.  · Schema-focused therapy (SFT). This form of therapy helps a person with a distorted self-image to see him or herself differently. This may be one-on-one treatment or part of group therapy.  · Family therapy. This treatment includes family members.  Medicine may be used to help control emotions and behavior and to treat anxiety and depression. The person may  need to stay in the hospital for protection if he or she is behaving in a dangerous way or expressing thoughts of suicide.  Follow these instructions at home:  The person with BPD should do these things:  · Take over-the-counter and prescription medicines only as told by his or her health care provider.  · Maintain a daily routine. The routine should include set times to eat and sleep.  · Do regular physical activity to reduce stress. He or she should ask a health care provider to recommend activities.  · Communicate with close friends and relatives about what triggers his or her symptoms.  · Find comforting people and environments.  · Keep all follow-up visits as told by her or his health care provider. This is important.  Where to find more information  · National [place] on Mental Illness: www.leonela.org  · [place] National [place] of [place] Health: www.nimh.nih.gov  Contact a health care provider if:  · The person's symptoms do not improve or they get worse.  · The person has new symptoms.  · The person uses drugs.  · The person's alcohol use increases.  · The person has trouble sleeping.  · The person eats too much or not enough.  Get help right away if:  · The person harms himself or herself, or expresses serious thoughts about self-harming.  · The person expresses serious thoughts about hurting others.  · The person sees or hears things that are not there (has hallucinations).  · The person disconnects from reality.  · The person is unconscious.  If it ever seems like the person may hurt himself/herself or others, or may have thoughts about taking his or her own life, get help right away. You can go to your nearest emergency department or call:  · Your local emergency services (911 in the [place]  · A suicide crisis helpline, such as the National Suicide Prevention Lifeline at 1-901.487.2900. This is open 24 hours a day.  Summary  · Borderline personality disorder (BPD) is a mental health disorder. People with  this condition may have unstable moods and relationships, have trouble controlling emotions, and may engage in impulsive or reckless behavior.  · A health care provider who specializes in physical and psychological causes of mental conditions (psychiatrist) will do a psychiatric evaluation. The person will be diagnosed with BPD if she or he has at least five common symptoms of the condition.  · This condition is usually treated by mental health professionals, such as psychologists, psychiatrists, and clinical social workers. More than one type of treatment may be needed.  · Contact a health care provider if the person's symptoms do not improve or they get worse.  This information is not intended to replace advice given to you by your health care provider. Make sure you discuss any questions you have with your health care provider.  Document Revised: 06/23/2021 Document Reviewed: 06/23/2021  [employer]vier Patient Education © 2021 [employer]

## 2021-10-11 NOTE — PROGRESS NOTES
"Chief Complaint  Follow-up and Borderline Personality Disorder    Subjective          Cruz Nichols presents to [facility]  History of Present Illness  She reports that she recently (Feb2021) lost custody of her youngest (10yo) daughter.  She reports having a 21 yo daughter who is her main support. She has started seeing a therapyist 9/22 at [facility]. Next appt 10/14. She reports having a break down at work. She states that [employer] put her on paid leave. She has to be cleared by a therapist at work prior to returning to work. She reports saying that she did not want to live to a coworker. She states that she is not going to hurt herself. She denies being suicidal. She states that she would never hurt herself as long as she still has her daughter around. She reports spending money. She reports that she is not sleeping well. She does not feel like her medications aren't working. She was seeing [facility]. She reports not taking lithium and wellbutrin whic she was being prescribed. She has been taking otc stress medications. She states that she was previously diagnosed with bipolar but that she does not have that.  Objective   Vital Signs:   /80 (BP Location: Left arm, Patient Position: Sitting, Cuff Size: Adult)   Pulse 90   Temp 96.1 °F (35.6 °C) (Temporal)   Resp 16   Ht 180.3 cm (71\")   Wt 88 kg (194 lb)   SpO2 99%   BMI 27.06 kg/m²     Physical Exam  Vitals and nursing note reviewed.   Constitutional:       General: She is not in acute distress.     Appearance: Normal appearance.   HENT:      Head: Normocephalic and atraumatic.      Right Ear: External ear normal.      Left Ear: External ear normal.      Nose: Nose normal.      Mouth/Throat:      Mouth: Mucous membranes are moist.   Eyes:      Conjunctiva/sclera: Conjunctivae normal.   Cardiovascular:      Rate and Rhythm: Normal rate and regular rhythm.      " Pulses: Normal pulses.      Heart sounds: Normal heart sounds. No murmur heard.  No friction rub. No gallop.    Pulmonary:      Effort: Pulmonary effort is normal. No respiratory distress.      Breath sounds: No wheezing, rhonchi or rales.   Musculoskeletal:      Cervical back: Neck supple.   Skin:     General: Skin is warm and dry.   Neurological:      General: No focal deficit present.      Mental Status: She is alert and oriented to person, place, and time.   Psychiatric:         Mood and Affect: Mood normal.         Behavior: Behavior normal.        Result Review :          Procedures      Assessment and Plan    Diagnoses and all orders for this visit:    1. PTSD (post-traumatic stress disorder) (Primary)  -     Cancel: Ambulatory Referral to Psychiatry  -     Ambulatory Referral to Psychiatry    2. Borderline personality disorder in adult (HCC)  -     Cancel: Ambulatory Referral to Psychiatry  -     Ambulatory Referral to Psychiatry    3. Major depressive disorder, recurrent episode, moderate degree (HCC)  -     Cancel: Ambulatory Referral to Psychiatry  -     Ambulatory Referral to Psychiatry    4. Screening mammogram for breast cancer  -     Mammo Screening Bilateral With CAD; Future            I spent 30 minutes caring for Cruz on this date of service. This time includes time spent by me in the following activities:preparing for the visit, counseling and educating the patient/family/caregiver, referring and communicating with other health care professionals  and documenting information in the medical record  Follow Up   Return in about 2 months (around 12/11/2021).  Patient was given instructions and counseling regarding her condition or for health maintenance advice. Please see specific information pulled into the AVS if appropriate.

## 2021-10-13 ENCOUNTER — TRANSCRIBE ORDERS (OUTPATIENT)
Dept: ADMINISTRATIVE | Facility: HOSPITAL | Age: 41
End: 2021-10-13

## 2021-10-13 DIAGNOSIS — Z12.31 SCREENING MAMMOGRAM, ENCOUNTER FOR: Primary | ICD-10-CM

## 2021-10-18 RX ORDER — SULFAMETHOXAZOLE AND TRIMETHOPRIM 800; 160 MG/1; MG/1
TABLET ORAL
Qty: 14 TABLET | Refills: 0 | OUTPATIENT
Start: 2021-10-18
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
## 2021-10-27 ENCOUNTER — TELEMEDICINE (OUTPATIENT)
Dept: PSYCHIATRY | Facility: CLINIC | Age: 41
End: 2021-10-27

## 2021-10-27 DIAGNOSIS — F33.2 SEVERE EPISODE OF RECURRENT MAJOR DEPRESSIVE DISORDER, WITHOUT PSYCHOTIC FEATURES (HCC): ICD-10-CM

## 2021-10-27 DIAGNOSIS — F43.10 POST TRAUMATIC STRESS DISORDER (PTSD): ICD-10-CM

## 2021-10-27 DIAGNOSIS — F41.1 GENERALIZED ANXIETY DISORDER: ICD-10-CM

## 2021-10-27 DIAGNOSIS — F60.3 BORDERLINE PERSONALITY DISORDER (HCC): Primary | ICD-10-CM

## 2021-10-27 DIAGNOSIS — G47.00 INSOMNIA, UNSPECIFIED TYPE: ICD-10-CM

## 2021-10-27 PROBLEM — R32 URINARY INCONTINENCE: Status: ACTIVE | Noted: 2021-10-27

## 2021-10-27 PROBLEM — N39.41 URGE INCONTINENCE: Status: ACTIVE | Noted: 2021-10-27

## 2021-10-27 PROBLEM — F31.9 BIPOLAR DISORDER: Status: ACTIVE | Noted: 2021-10-27

## 2021-10-27 PROBLEM — G43.009 MIGRAINE WITHOUT AURA: Status: ACTIVE | Noted: 2021-10-27

## 2021-10-27 PROBLEM — J30.9 ALLERGIC RHINITIS: Status: ACTIVE | Noted: 2021-10-27

## 2021-10-27 PROCEDURE — 90792 PSYCH DIAG EVAL W/MED SRVCS: CPT | Performed by: PHYSICIAN ASSISTANT

## 2021-10-27 RX ORDER — LAMOTRIGINE 25 MG/1
TABLET ORAL
Qty: 60 TABLET | Refills: 1 | Status: SHIPPED | OUTPATIENT
Start: 2021-10-27 | End: 2021-11-24

## 2021-10-27 RX ORDER — QUETIAPINE FUMARATE 50 MG/1
50 TABLET, FILM COATED ORAL NIGHTLY
Qty: 30 TABLET | Refills: 1 | Status: SHIPPED | OUTPATIENT
Start: 2021-10-27 | End: 2021-11-24 | Stop reason: SDUPTHER

## 2021-10-29 ENCOUNTER — PATIENT ROUNDING (BHMG ONLY) (OUTPATIENT)
Dept: PSYCHIATRY | Facility: CLINIC | Age: 41
End: 2021-10-29

## 2021-10-29 NOTE — PROGRESS NOTES
October 29, 2021    Hello, may I speak with Cruz Nichols?    My name is KINA Diaz      I am  with [facility]  [facility]  [address]  [place] [address] [phone].    Before we get started may I verify your date of birth? 1980    I am calling to officially welcome you to our practice and ask about your recent visit. Is this a good time to talk? yes    Tell me about your visit with us. What things went well?  Jess LISTENED to me.  Most doctors never listen, they just medicate me.         We're always looking for ways to make our patients' experiences even better. Do you have recommendations on ways we may improve?  no    Overall were you satisfied with your first visit to our practice? yes       I appreciate you taking the time to speak with me today. Is there anything else I can do for you? no      Thank you, and have a great day.
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
## 2021-11-24 ENCOUNTER — TELEMEDICINE (OUTPATIENT)
Dept: PSYCHIATRY | Facility: CLINIC | Age: 41
End: 2021-11-24

## 2021-11-24 DIAGNOSIS — F41.1 GENERALIZED ANXIETY DISORDER: ICD-10-CM

## 2021-11-24 DIAGNOSIS — F33.2 SEVERE EPISODE OF RECURRENT MAJOR DEPRESSIVE DISORDER, WITHOUT PSYCHOTIC FEATURES (HCC): ICD-10-CM

## 2021-11-24 DIAGNOSIS — G47.00 INSOMNIA, UNSPECIFIED TYPE: ICD-10-CM

## 2021-11-24 DIAGNOSIS — F43.10 POST TRAUMATIC STRESS DISORDER (PTSD): ICD-10-CM

## 2021-11-24 DIAGNOSIS — F60.3 BORDERLINE PERSONALITY DISORDER (HCC): Primary | ICD-10-CM

## 2021-11-24 PROCEDURE — 99213 OFFICE O/P EST LOW 20 MIN: CPT | Performed by: PHYSICIAN ASSISTANT

## 2021-11-24 RX ORDER — QUETIAPINE FUMARATE 50 MG/1
50 TABLET, FILM COATED ORAL NIGHTLY
Qty: 30 TABLET | Refills: 2 | Status: SHIPPED | OUTPATIENT
Start: 2021-11-24 | End: 2022-01-17 | Stop reason: SDUPTHER

## 2021-11-24 RX ORDER — LAMOTRIGINE 100 MG/1
100 TABLET ORAL DAILY
Qty: 30 TABLET | Refills: 2 | Status: SHIPPED | OUTPATIENT
Start: 2021-11-24 | End: 2022-01-17

## 2021-11-26 ENCOUNTER — HOSPITAL ENCOUNTER (EMERGENCY)
Facility: HOSPITAL | Age: 41
Discharge: HOME | End: 2021-11-26
Attending: EMERGENCY MEDICINE | Admitting: EMERGENCY MEDICINE

## 2021-11-26 VITALS
RESPIRATION RATE: 18 BRPM | TEMPERATURE: 98.6 F | DIASTOLIC BLOOD PRESSURE: 91 MMHG | BODY MASS INDEX: 27.38 KG/M2 | OXYGEN SATURATION: 99 % | HEIGHT: 71 IN | HEART RATE: 91 BPM | SYSTOLIC BLOOD PRESSURE: 125 MMHG | WEIGHT: 195.55 LBS

## 2021-11-26 DIAGNOSIS — B34.9 VIRAL SYNDROME: ICD-10-CM

## 2021-11-26 DIAGNOSIS — Z20.822 ENCOUNTER FOR SCREENING LABORATORY TESTING FOR COVID-19 VIRUS: Primary | ICD-10-CM

## 2021-11-26 LAB — SARS-COV-2 N GENE RESP QL NAA+PROBE: NOT DETECTED

## 2021-11-26 PROCEDURE — 87635 SARS-COV-2 COVID-19 AMP PRB: CPT | Performed by: EMERGENCY MEDICINE

## 2021-11-26 PROCEDURE — 99283 EMERGENCY DEPT VISIT LOW MDM: CPT

## 2021-11-26 PROCEDURE — C9803 HOPD COVID-19 SPEC COLLECT: HCPCS

## 2021-11-26 RX ORDER — PREDNISONE 20 MG/1
20 TABLET ORAL DAILY
Qty: 5 TABLET | Refills: 0 | Status: SHIPPED | OUTPATIENT
Start: 2021-11-26 | End: 2021-12-13

## 2021-12-06 RX ORDER — OXYBUTYNIN CHLORIDE 10 MG/1
TABLET, EXTENDED RELEASE ORAL
Qty: 90 TABLET | Refills: 1 | Status: SHIPPED | OUTPATIENT
Start: 2021-12-06 | End: 2022-05-05

## 2021-12-13 ENCOUNTER — OFFICE VISIT (OUTPATIENT)
Dept: INTERNAL MEDICINE | Facility: CLINIC | Age: 41
End: 2021-12-13

## 2021-12-13 VITALS
TEMPERATURE: 97.8 F | RESPIRATION RATE: 16 BRPM | DIASTOLIC BLOOD PRESSURE: 80 MMHG | HEIGHT: 71 IN | OXYGEN SATURATION: 99 % | HEART RATE: 95 BPM | SYSTOLIC BLOOD PRESSURE: 130 MMHG | BODY MASS INDEX: 27.16 KG/M2 | WEIGHT: 194 LBS

## 2021-12-13 DIAGNOSIS — F31.31 BIPOLAR AFFECTIVE DISORDER, CURRENTLY DEPRESSED, MILD (HCC): Primary | ICD-10-CM

## 2021-12-13 DIAGNOSIS — T14.8XXA EXCORIATION: ICD-10-CM

## 2021-12-13 DIAGNOSIS — Z23 NEED FOR DIPHTHERIA-TETANUS-PERTUSSIS (TDAP) VACCINE: ICD-10-CM

## 2021-12-13 DIAGNOSIS — F33.1 MAJOR DEPRESSIVE DISORDER, RECURRENT EPISODE, MODERATE DEGREE (HCC): ICD-10-CM

## 2021-12-13 DIAGNOSIS — F43.10 PTSD (POST-TRAUMATIC STRESS DISORDER): ICD-10-CM

## 2021-12-13 DIAGNOSIS — F60.3 BORDERLINE PERSONALITY DISORDER IN ADULT (HCC): Chronic | ICD-10-CM

## 2021-12-13 PROCEDURE — 99213 OFFICE O/P EST LOW 20 MIN: CPT | Performed by: PHYSICIAN ASSISTANT

## 2021-12-13 PROCEDURE — 90471 IMMUNIZATION ADMIN: CPT | Performed by: PHYSICIAN ASSISTANT

## 2021-12-13 PROCEDURE — 90715 TDAP VACCINE 7 YRS/> IM: CPT | Performed by: PHYSICIAN ASSISTANT
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
## 2021-12-21 ENCOUNTER — APPOINTMENT (OUTPATIENT)
Dept: MAMMOGRAPHY | Facility: HOSPITAL | Age: 41
End: 2021-12-21

## 2022-01-17 ENCOUNTER — TELEMEDICINE (OUTPATIENT)
Dept: BEHAVIORAL HEALTH | Facility: CLINIC | Age: 42
End: 2022-01-17

## 2022-01-17 DIAGNOSIS — F43.10 POST TRAUMATIC STRESS DISORDER (PTSD): ICD-10-CM

## 2022-01-17 DIAGNOSIS — G47.00 INSOMNIA, UNSPECIFIED TYPE: ICD-10-CM

## 2022-01-17 DIAGNOSIS — F60.3 BORDERLINE PERSONALITY DISORDER: Primary | ICD-10-CM

## 2022-01-17 DIAGNOSIS — F41.1 GENERALIZED ANXIETY DISORDER: ICD-10-CM

## 2022-01-17 DIAGNOSIS — F33.2 SEVERE EPISODE OF RECURRENT MAJOR DEPRESSIVE DISORDER, WITHOUT PSYCHOTIC FEATURES: ICD-10-CM

## 2022-01-17 PROCEDURE — 99213 OFFICE O/P EST LOW 20 MIN: CPT | Performed by: PHYSICIAN ASSISTANT

## 2022-01-17 RX ORDER — LAMOTRIGINE 100 MG/1
100 TABLET ORAL DAILY
Qty: 30 TABLET | Refills: 3 | Status: SHIPPED | OUTPATIENT
Start: 2022-01-17 | End: 2022-03-21 | Stop reason: SDUPTHER

## 2022-01-17 RX ORDER — QUETIAPINE FUMARATE 50 MG/1
50 TABLET, FILM COATED ORAL NIGHTLY
Qty: 30 TABLET | Refills: 3 | Status: SHIPPED | OUTPATIENT
Start: 2022-01-17 | End: 2022-03-21 | Stop reason: SDUPTHER

## 2022-01-17 RX ORDER — LAMOTRIGINE 25 MG/1
TABLET ORAL
COMMUNITY
Start: 2021-12-09 | End: 2022-01-17
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
## 2022-02-14 ENCOUNTER — HOSPITAL ENCOUNTER (OUTPATIENT)
Dept: MAMMOGRAPHY | Facility: HOSPITAL | Age: 42
Discharge: HOME | End: 2022-02-14
Admitting: NURSE PRACTITIONER

## 2022-02-14 DIAGNOSIS — Z12.31 SCREENING MAMMOGRAM, ENCOUNTER FOR: ICD-10-CM

## 2022-02-14 PROCEDURE — 77067 SCR MAMMO BI INCL CAD: CPT

## 2022-02-14 PROCEDURE — 77063 BREAST TOMOSYNTHESIS BI: CPT

## 2022-03-21 ENCOUNTER — TELEMEDICINE (OUTPATIENT)
Dept: BEHAVIORAL HEALTH | Facility: CLINIC | Age: 42
End: 2022-03-21

## 2022-03-21 DIAGNOSIS — F43.10 POST TRAUMATIC STRESS DISORDER (PTSD): ICD-10-CM

## 2022-03-21 DIAGNOSIS — F60.3 BORDERLINE PERSONALITY DISORDER: ICD-10-CM

## 2022-03-21 DIAGNOSIS — G47.00 INSOMNIA, UNSPECIFIED TYPE: ICD-10-CM

## 2022-03-21 DIAGNOSIS — F33.2 SEVERE EPISODE OF RECURRENT MAJOR DEPRESSIVE DISORDER, WITHOUT PSYCHOTIC FEATURES: Primary | ICD-10-CM

## 2022-03-21 DIAGNOSIS — F41.1 GENERALIZED ANXIETY DISORDER: ICD-10-CM

## 2022-03-21 PROCEDURE — 90833 PSYTX W PT W E/M 30 MIN: CPT | Performed by: PHYSICIAN ASSISTANT

## 2022-03-21 PROCEDURE — 99214 OFFICE O/P EST MOD 30 MIN: CPT | Performed by: PHYSICIAN ASSISTANT

## 2022-03-21 RX ORDER — BUPROPION HYDROCHLORIDE 150 MG/1
150 TABLET ORAL EVERY MORNING
Qty: 30 TABLET | Refills: 1 | Status: SHIPPED | OUTPATIENT
Start: 2022-03-21 | End: 2022-04-05 | Stop reason: SDUPTHER

## 2022-03-21 RX ORDER — QUETIAPINE FUMARATE 50 MG/1
50 TABLET, FILM COATED ORAL NIGHTLY
Qty: 30 TABLET | Refills: 3 | Status: SHIPPED | OUTPATIENT
Start: 2022-03-21 | End: 2022-04-05 | Stop reason: SDUPTHER

## 2022-03-21 RX ORDER — LAMOTRIGINE 100 MG/1
100 TABLET ORAL DAILY
Qty: 30 TABLET | Refills: 3 | Status: SHIPPED | OUTPATIENT
Start: 2022-03-21 | End: 2022-04-05 | Stop reason: SDUPTHER
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
## 2022-03-23 ENCOUNTER — OFFICE VISIT (OUTPATIENT)
Dept: INTERNAL MEDICINE | Facility: CLINIC | Age: 42
End: 2022-03-23

## 2022-03-23 VITALS
HEART RATE: 84 BPM | TEMPERATURE: 97.4 F | RESPIRATION RATE: 15 BRPM | BODY MASS INDEX: 27.44 KG/M2 | SYSTOLIC BLOOD PRESSURE: 126 MMHG | DIASTOLIC BLOOD PRESSURE: 72 MMHG | WEIGHT: 196 LBS | HEIGHT: 71 IN | OXYGEN SATURATION: 97 %

## 2022-03-23 DIAGNOSIS — N39.46 MIXED STRESS AND URGE URINARY INCONTINENCE: ICD-10-CM

## 2022-03-23 DIAGNOSIS — F60.3 BORDERLINE PERSONALITY DISORDER IN ADULT: Chronic | ICD-10-CM

## 2022-03-23 DIAGNOSIS — Z13.220 SCREENING FOR CHOLESTEROL LEVEL: ICD-10-CM

## 2022-03-23 DIAGNOSIS — Z13.29 SCREENING FOR THYROID DISORDER: ICD-10-CM

## 2022-03-23 DIAGNOSIS — F33.1 MAJOR DEPRESSIVE DISORDER, RECURRENT EPISODE, MODERATE DEGREE: Primary | ICD-10-CM

## 2022-03-23 DIAGNOSIS — F31.31 BIPOLAR AFFECTIVE DISORDER, CURRENTLY DEPRESSED, MILD: ICD-10-CM

## 2022-03-23 LAB
ALBUMIN SERPL-MCNC: 4.2 G/DL (ref 3.5–5.2)
ALBUMIN/GLOB SERPL: 1.3 G/DL
ALP SERPL-CCNC: 66 U/L (ref 39–117)
ALT SERPL W P-5'-P-CCNC: 12 U/L (ref 1–33)
ANION GAP SERPL CALCULATED.3IONS-SCNC: 9.2 MMOL/L (ref 5–15)
AST SERPL-CCNC: 13 U/L (ref 1–32)
BASOPHILS # BLD AUTO: 0.05 10*3/MM3 (ref 0–0.2)
BASOPHILS NFR BLD AUTO: 0.7 % (ref 0–1.5)
BILIRUB SERPL-MCNC: <0.2 MG/DL (ref 0–1.2)
BUN SERPL-MCNC: 10 MG/DL (ref 6–20)
BUN/CREAT SERPL: 12.8 (ref 7–25)
CALCIUM SPEC-SCNC: 9.2 MG/DL (ref 8.6–10.5)
CHLORIDE SERPL-SCNC: 104 MMOL/L (ref 98–107)
CHOLEST SERPL-MCNC: 163 MG/DL (ref 0–200)
CO2 SERPL-SCNC: 24.8 MMOL/L (ref 22–29)
CREAT SERPL-MCNC: 0.78 MG/DL (ref 0.57–1)
DEPRECATED RDW RBC AUTO: 41 FL (ref 37–54)
EGFRCR SERPLBLD CKD-EPI 2021: 97.4 ML/MIN/1.73
EOSINOPHIL # BLD AUTO: 0.08 10*3/MM3 (ref 0–0.4)
EOSINOPHIL NFR BLD AUTO: 1.1 % (ref 0.3–6.2)
ERYTHROCYTE [DISTWIDTH] IN BLOOD BY AUTOMATED COUNT: 12.4 % (ref 12.3–15.4)
GLOBULIN UR ELPH-MCNC: 3.2 GM/DL
GLUCOSE SERPL-MCNC: 92 MG/DL (ref 65–99)
HCT VFR BLD AUTO: 38.1 % (ref 34–46.6)
HDLC SERPL-MCNC: 47 MG/DL (ref 40–60)
HGB BLD-MCNC: 12.5 G/DL (ref 12–15.9)
IMM GRANULOCYTES # BLD AUTO: 0.01 10*3/MM3 (ref 0–0.05)
IMM GRANULOCYTES NFR BLD AUTO: 0.1 % (ref 0–0.5)
LDLC SERPL CALC-MCNC: 96 MG/DL (ref 0–100)
LDLC/HDLC SERPL: 2.01 {RATIO}
LYMPHOCYTES # BLD AUTO: 1.6 10*3/MM3 (ref 0.7–3.1)
LYMPHOCYTES NFR BLD AUTO: 21.9 % (ref 19.6–45.3)
MCH RBC QN AUTO: 29.8 PG (ref 26.6–33)
MCHC RBC AUTO-ENTMCNC: 32.8 G/DL (ref 31.5–35.7)
MCV RBC AUTO: 90.9 FL (ref 79–97)
MONOCYTES # BLD AUTO: 0.63 10*3/MM3 (ref 0.1–0.9)
MONOCYTES NFR BLD AUTO: 8.6 % (ref 5–12)
NEUTROPHILS NFR BLD AUTO: 4.93 10*3/MM3 (ref 1.7–7)
NEUTROPHILS NFR BLD AUTO: 67.6 % (ref 42.7–76)
NRBC BLD AUTO-RTO: 0 /100 WBC (ref 0–0.2)
PLATELET # BLD AUTO: 262 10*3/MM3 (ref 140–450)
PMV BLD AUTO: 11.1 FL (ref 6–12)
POTASSIUM SERPL-SCNC: 4.1 MMOL/L (ref 3.5–5.2)
PROT SERPL-MCNC: 7.4 G/DL (ref 6–8.5)
RBC # BLD AUTO: 4.19 10*6/MM3 (ref 3.77–5.28)
SODIUM SERPL-SCNC: 138 MMOL/L (ref 136–145)
TRIGL SERPL-MCNC: 108 MG/DL (ref 0–150)
TSH SERPL DL<=0.05 MIU/L-ACNC: 1.22 UIU/ML (ref 0.27–4.2)
VLDLC SERPL-MCNC: 20 MG/DL (ref 5–40)
WBC NRBC COR # BLD: 7.3 10*3/MM3 (ref 3.4–10.8)

## 2022-03-23 PROCEDURE — 99213 OFFICE O/P EST LOW 20 MIN: CPT | Performed by: PHYSICIAN ASSISTANT

## 2022-03-23 PROCEDURE — 80061 LIPID PANEL: CPT | Performed by: PHYSICIAN ASSISTANT

## 2022-03-23 PROCEDURE — 80050 GENERAL HEALTH PANEL: CPT | Performed by: PHYSICIAN ASSISTANT

## 2022-03-23 NOTE — ASSESSMENT & PLAN NOTE
Psychological condition is unchanged.  Continue current treatment regimen.  Psychological condition  will be reassessed with psych at follow up visit.

## 2022-03-23 NOTE — ASSESSMENT & PLAN NOTE
Patient's depression is recurrent and is moderate without psychosis. Their depression is currently active and the condition is worsening. This will be reassessed at the next regular appointment. F/U as described:patient will continue current medication therapy Rx by psych, continue counseling. Call 911/go to er if SI/hi.

## 2022-03-23 NOTE — PATIENT INSTRUCTIONS
Major Depressive Disorder, Adult  Major depressive disorder is a mental health condition. This disorder affects feelings. It can also affect the body. Symptoms of this condition last most of the day, almost every day, for 2 weeks. This disorder can affect:  Relationships.  Daily activities, such as work and school.  Activities that you normally like to do.  What are the causes?  The cause of this condition is not known. The disorder is likely caused by a mix of things, including:  Your personality, such as being a shy person.  Your behavior, or how you act toward others.  Your thoughts and feelings.  Too much alcohol or drugs.  How you react to stress.  Health and mental problems that you have had for a long time.  Things that hurt you in the past (trauma).  Big changes in your life, such as divorce.  What increases the risk?  The following factors may make you more likely to develop this condition:  Having family members with depression.  Being a woman.  Problems in the family.  Low levels of some brain chemicals.  Things that caused you pain as a child, especially if you lost a parent or were abused.  A lot of stress in your life, such as from:  Living without basic needs of life, such as food and shelter.  Being treated poorly because of race, sex, or Jewish (discrimination).  Health and mental problems that you have had for a long time.  What are the signs or symptoms?  The main symptoms of this condition are:  Being sad all the time.  Being grouchy all the time.  Loss of interest in things and activities.  Other symptoms include:  Sleeping too much or too little.  Eating too much or too little.  Gaining or losing weight, without knowing why.  Feeling tired or having low energy.  Being restless and weak.  Feeling hopeless, worthless, or guilty.  Trouble thinking clearly or making decisions.  Thoughts of hurting yourself or others, or thoughts of ending your life.  Spending a lot of time alone.  Inability to  complete common tasks of daily life.  If you have very bad MDD, you may:  Believe things that are not true.  Hear, see, taste, or feel things that are not there.  Have mild depression that lasts for at least 2 years.  Feel very sad and hopeless.  Have trouble speaking or moving.  How is this treated?  This condition may be treated with:  Talk therapy. This teaches you to know bad thoughts, feelings, and actions and how to change them.  This can also help you to communicate with others.  This can be done with members of your family.  Medicines. These can be used to treat worry (anxiety), depression, or low levels of chemicals in the brain.  Lifestyle changes. You may need to:  Limit alcohol use.  Limit drug use.  Get regular exercise.  Get plenty of sleep.  Make healthy eating choices.  Spend more time outdoors.  Brain stimulation. This treatment excites the brain. This is done when symptoms are very bad or have not gotten better with other treatments.  Follow these instructions at home:  Activity  Get regular exercise as told.  Spend time outdoors as told.  Make time to do the things you enjoy.  Find ways to deal with stress. Try to:  Meditate.  Do deep breathing.  Spend time in nature.  Keep a journal.  Return to your normal activities as told by your doctor. Ask your doctor what activities are safe for you.  Alcohol and drug use  If you drink alcohol:  Limit how much you use to:  0-1 drink a day for women.  0-2 drinks a day for men.  Be aware of how much alcohol is in your drink. In the [place] one drink equals one 12 oz bottle of beer (355 mL), one 5 oz glass of wine (148 mL), or one 1½ oz glass of hard liquor (44 mL).  Talk to your doctor about:  Alcohol use. Alcohol can affect some medicines.  Any drug use.  General instructions    Take over-the-counter and prescription medicines and herbal preparations only as told by your doctor.  Eat a healthy diet.  Get a lot of sleep.  Think about joining a support group.  Your doctor may be able to suggest one.  Keep all follow-up visits as told by your doctor. This is important.    Where to find more information:  National [place] on Mental Illness: www.leonela.org  [place] National [place] of Mental Health: www.nimh.nih.gov  American Psychiatric Association: www.psychiatry.org/patients-families/  Contact a doctor if:  Your symptoms get worse.  You get new symptoms.  Get help right away if:  You hurt yourself.  You have serious thoughts about hurting yourself or others.  You see, hear, taste, smell, or feel things that are not there.  If you ever feel like you may hurt yourself or others, or have thoughts about taking your own life, get help right away. Go to your nearest emergency department or:  Call your local emergency services (395 in the [place]  Call a suicide crisis helpline, such as the National Suicide Prevention Lifeline at 1-859.583.9058. This is open 24 hours a day in the [place]  Text the Crisis Text Line at 689971 (in the [place]  Summary  Major depressive disorder is a mental health condition. This disorder affects feelings. Symptoms of this condition last most of the day, almost every day, for 2 weeks.  The symptoms of this disorder can cause problems with relationships and with daily activities.  There are treatments and support for people who get this disorder. You may need more than one type of treatment.  Get help right away if you have serious thoughts about hurting yourself or others.  This information is not intended to replace advice given to you by your health care provider. Make sure you discuss any questions you have with your health care provider.  Document Revised: 11/28/2020 Document Reviewed: 11/28/2020  Elsevier Patient Education © 2021 [employer]
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
## 2022-04-05 ENCOUNTER — TELEMEDICINE (OUTPATIENT)
Dept: BEHAVIORAL HEALTH | Facility: CLINIC | Age: 42
End: 2022-04-05

## 2022-04-05 DIAGNOSIS — F41.1 GENERALIZED ANXIETY DISORDER: ICD-10-CM

## 2022-04-05 DIAGNOSIS — F33.2 SEVERE EPISODE OF RECURRENT MAJOR DEPRESSIVE DISORDER, WITHOUT PSYCHOTIC FEATURES: Primary | ICD-10-CM

## 2022-04-05 DIAGNOSIS — F60.3 BORDERLINE PERSONALITY DISORDER: ICD-10-CM

## 2022-04-05 DIAGNOSIS — F43.10 POST TRAUMATIC STRESS DISORDER (PTSD): ICD-10-CM

## 2022-04-05 DIAGNOSIS — G47.00 INSOMNIA, UNSPECIFIED TYPE: ICD-10-CM

## 2022-04-05 PROCEDURE — 99213 OFFICE O/P EST LOW 20 MIN: CPT | Performed by: PHYSICIAN ASSISTANT

## 2022-04-05 RX ORDER — BUPROPION HYDROCHLORIDE 150 MG/1
150 TABLET ORAL EVERY MORNING
Qty: 30 TABLET | Refills: 1 | Status: SHIPPED | OUTPATIENT
Start: 2022-04-05 | End: 2022-05-02

## 2022-04-05 RX ORDER — LAMOTRIGINE 100 MG/1
100 TABLET ORAL DAILY
Qty: 30 TABLET | Refills: 1 | Status: SHIPPED | OUTPATIENT
Start: 2022-04-05 | End: 2022-05-02 | Stop reason: SDUPTHER

## 2022-04-05 RX ORDER — QUETIAPINE FUMARATE 50 MG/1
50 TABLET, FILM COATED ORAL NIGHTLY
Qty: 30 TABLET | Refills: 1 | Status: SHIPPED | OUTPATIENT
Start: 2022-04-05 | End: 2022-05-02 | Stop reason: SDUPTHER

## 2022-04-13 ENCOUNTER — OFFICE VISIT (OUTPATIENT)
Dept: INTERNAL MEDICINE | Facility: CLINIC | Age: 42
End: 2022-04-13

## 2022-04-13 VITALS
RESPIRATION RATE: 15 BRPM | BODY MASS INDEX: 27.02 KG/M2 | OXYGEN SATURATION: 97 % | HEIGHT: 71 IN | HEART RATE: 109 BPM | WEIGHT: 193 LBS | SYSTOLIC BLOOD PRESSURE: 110 MMHG | DIASTOLIC BLOOD PRESSURE: 80 MMHG | TEMPERATURE: 97 F

## 2022-04-13 DIAGNOSIS — F33.1 MAJOR DEPRESSIVE DISORDER, RECURRENT EPISODE, MODERATE DEGREE: ICD-10-CM

## 2022-04-13 DIAGNOSIS — F31.31 BIPOLAR AFFECTIVE DISORDER, CURRENTLY DEPRESSED, MILD: ICD-10-CM

## 2022-04-13 DIAGNOSIS — Z00.00 ANNUAL PHYSICAL EXAM: Primary | ICD-10-CM

## 2022-04-13 PROCEDURE — 99396 PREV VISIT EST AGE 40-64: CPT | Performed by: PHYSICIAN ASSISTANT
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
## 2022-05-02 ENCOUNTER — TELEMEDICINE (OUTPATIENT)
Dept: BEHAVIORAL HEALTH | Facility: CLINIC | Age: 42
End: 2022-05-02

## 2022-05-02 DIAGNOSIS — G47.00 INSOMNIA, UNSPECIFIED TYPE: ICD-10-CM

## 2022-05-02 DIAGNOSIS — F33.2 SEVERE EPISODE OF RECURRENT MAJOR DEPRESSIVE DISORDER, WITHOUT PSYCHOTIC FEATURES: ICD-10-CM

## 2022-05-02 DIAGNOSIS — F41.1 GENERALIZED ANXIETY DISORDER: ICD-10-CM

## 2022-05-02 DIAGNOSIS — F60.3 BORDERLINE PERSONALITY DISORDER: ICD-10-CM

## 2022-05-02 DIAGNOSIS — F43.10 POST TRAUMATIC STRESS DISORDER (PTSD): ICD-10-CM

## 2022-05-02 PROCEDURE — 99213 OFFICE O/P EST LOW 20 MIN: CPT | Performed by: PHYSICIAN ASSISTANT

## 2022-05-02 RX ORDER — LAMOTRIGINE 100 MG/1
100 TABLET ORAL DAILY
Qty: 30 TABLET | Refills: 1 | Status: SHIPPED | OUTPATIENT
Start: 2022-05-02 | End: 2022-07-18 | Stop reason: SDUPTHER

## 2022-05-02 RX ORDER — BUPROPION HYDROCHLORIDE 300 MG/1
300 TABLET ORAL EVERY MORNING
Qty: 30 TABLET | Refills: 1 | Status: SHIPPED | OUTPATIENT
Start: 2022-05-02 | End: 2022-06-27

## 2022-05-02 RX ORDER — QUETIAPINE FUMARATE 50 MG/1
50 TABLET, FILM COATED ORAL NIGHTLY
Qty: 30 TABLET | Refills: 1 | Status: SHIPPED | OUTPATIENT
Start: 2022-05-02 | End: 2022-07-18 | Stop reason: SDUPTHER
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
## 2022-05-05 ENCOUNTER — OFFICE VISIT (OUTPATIENT)
Dept: UROLOGY | Facility: CLINIC | Age: 42
End: 2022-05-05

## 2022-05-05 VITALS — BODY MASS INDEX: 27.64 KG/M2 | RESPIRATION RATE: 12 BRPM | HEIGHT: 71 IN | WEIGHT: 197.4 LBS

## 2022-05-05 DIAGNOSIS — N39.41 URGE INCONTINENCE: Primary | ICD-10-CM

## 2022-05-05 LAB
BILIRUB BLD-MCNC: NEGATIVE MG/DL
CLARITY, POC: CLEAR
COLOR UR: YELLOW
EXPIRATION DATE: ABNORMAL
GLUCOSE UR STRIP-MCNC: NEGATIVE MG/DL
KETONES UR QL: NEGATIVE
LEUKOCYTE EST, POC: NEGATIVE
Lab: ABNORMAL
NITRITE UR-MCNC: NEGATIVE MG/ML
PH UR: 8.5 [PH] (ref 5–8)
PROT UR STRIP-MCNC: NEGATIVE MG/DL
RBC # UR STRIP: NEGATIVE /UL
SP GR UR: 1.02 (ref 1–1.03)
UROBILINOGEN UR QL: NORMAL

## 2022-05-05 PROCEDURE — 99214 OFFICE O/P EST MOD 30 MIN: CPT | Performed by: NURSE PRACTITIONER

## 2022-05-05 RX ORDER — OXYBUTYNIN CHLORIDE 15 MG/1
15 TABLET, EXTENDED RELEASE ORAL DAILY
Qty: 90 TABLET | Refills: 3 | Status: SHIPPED | OUTPATIENT
Start: 2022-05-05 | End: 2022-05-19
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
## 2022-05-12 ENCOUNTER — PROCEDURE VISIT (OUTPATIENT)
Dept: UROLOGY | Facility: CLINIC | Age: 42
End: 2022-05-12

## 2022-05-12 DIAGNOSIS — R39.15 URINARY URGENCY: ICD-10-CM

## 2022-05-12 DIAGNOSIS — N39.3 STRESS INCONTINENCE: Primary | ICD-10-CM

## 2022-05-12 PROCEDURE — 51784 ANAL/URINARY MUSCLE STUDY: CPT | Performed by: UROLOGY

## 2022-05-12 PROCEDURE — 51797 INTRAABDOMINAL PRESSURE TEST: CPT | Performed by: UROLOGY

## 2022-05-12 PROCEDURE — 51729 CYSTOMETROGRAM W/VP&UP: CPT | Performed by: UROLOGY

## 2022-05-12 PROCEDURE — 51741 ELECTRO-UROFLOWMETRY FIRST: CPT | Performed by: UROLOGY

## 2022-05-19 ENCOUNTER — OFFICE VISIT (OUTPATIENT)
Dept: UROLOGY | Facility: CLINIC | Age: 42
End: 2022-05-19

## 2022-05-19 VITALS — HEIGHT: 71 IN | WEIGHT: 193 LBS | BODY MASS INDEX: 27.02 KG/M2

## 2022-05-19 DIAGNOSIS — N39.3 STRESS INCONTINENCE IN FEMALE: Primary | ICD-10-CM

## 2022-05-19 PROCEDURE — 99213 OFFICE O/P EST LOW 20 MIN: CPT | Performed by: NURSE PRACTITIONER

## 2022-05-19 RX ORDER — SOLIFENACIN SUCCINATE 5 MG/1
5 TABLET, FILM COATED ORAL DAILY
Qty: 90 TABLET | Refills: 3 | Status: SHIPPED | OUTPATIENT
Start: 2022-05-19 | End: 2023-01-11
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
## 2022-06-05 ENCOUNTER — HOSPITAL ENCOUNTER (EMERGENCY)
Facility: HOSPITAL | Age: 42
Discharge: HOME | End: 2022-06-05
Attending: EMERGENCY MEDICINE | Admitting: EMERGENCY MEDICINE

## 2022-06-05 VITALS
BODY MASS INDEX: 26.67 KG/M2 | DIASTOLIC BLOOD PRESSURE: 93 MMHG | RESPIRATION RATE: 18 BRPM | HEIGHT: 71 IN | SYSTOLIC BLOOD PRESSURE: 121 MMHG | OXYGEN SATURATION: 97 % | HEART RATE: 93 BPM | WEIGHT: 190.48 LBS | TEMPERATURE: 98.5 F

## 2022-06-05 DIAGNOSIS — N39.0 BACTERIAL UTI: Primary | ICD-10-CM

## 2022-06-05 DIAGNOSIS — A49.9 BACTERIAL UTI: Primary | ICD-10-CM

## 2022-06-05 LAB
ALBUMIN SERPL-MCNC: 4.7 G/DL (ref 3.5–5.2)
ALBUMIN/GLOB SERPL: 1.5 G/DL
ALP SERPL-CCNC: 69 U/L (ref 39–117)
ALT SERPL W P-5'-P-CCNC: 15 U/L (ref 1–33)
ANION GAP SERPL CALCULATED.3IONS-SCNC: 12.7 MMOL/L (ref 5–15)
AST SERPL-CCNC: 13 U/L (ref 1–32)
BACTERIA UR QL AUTO: ABNORMAL /HPF
BASOPHILS # BLD AUTO: 0.04 10*3/MM3 (ref 0–0.2)
BASOPHILS NFR BLD AUTO: 0.4 % (ref 0–1.5)
BILIRUB SERPL-MCNC: 0.2 MG/DL (ref 0–1.2)
BILIRUB UR QL STRIP: NEGATIVE
BUN SERPL-MCNC: 11 MG/DL (ref 6–20)
BUN/CREAT SERPL: 12.4 (ref 7–25)
CALCIUM SPEC-SCNC: 9.9 MG/DL (ref 8.6–10.5)
CHLORIDE SERPL-SCNC: 105 MMOL/L (ref 98–107)
CLARITY UR: ABNORMAL
CO2 SERPL-SCNC: 21.3 MMOL/L (ref 22–29)
COLOR UR: ABNORMAL
CREAT SERPL-MCNC: 0.89 MG/DL (ref 0.57–1)
DEPRECATED RDW RBC AUTO: 42.5 FL (ref 37–54)
EGFRCR SERPLBLD CKD-EPI 2021: 83.1 ML/MIN/1.73
EOSINOPHIL # BLD AUTO: 0.07 10*3/MM3 (ref 0–0.4)
EOSINOPHIL NFR BLD AUTO: 0.6 % (ref 0.3–6.2)
ERYTHROCYTE [DISTWIDTH] IN BLOOD BY AUTOMATED COUNT: 13.1 % (ref 12.3–15.4)
GLOBULIN UR ELPH-MCNC: 3.1 GM/DL
GLUCOSE SERPL-MCNC: 103 MG/DL (ref 65–99)
GLUCOSE UR STRIP-MCNC: NEGATIVE MG/DL
HCG INTACT+B SERPL-ACNC: <0.5 MIU/ML
HCT VFR BLD AUTO: 41 % (ref 34–46.6)
HGB BLD-MCNC: 13.8 G/DL (ref 12–15.9)
HGB UR QL STRIP.AUTO: ABNORMAL
HOLD SPECIMEN: NORMAL
HOLD SPECIMEN: NORMAL
HYALINE CASTS UR QL AUTO: ABNORMAL /LPF
IMM GRANULOCYTES # BLD AUTO: 0.03 10*3/MM3 (ref 0–0.05)
IMM GRANULOCYTES NFR BLD AUTO: 0.3 % (ref 0–0.5)
KETONES UR QL STRIP: ABNORMAL
LEUKOCYTE ESTERASE UR QL STRIP.AUTO: ABNORMAL
LIPASE SERPL-CCNC: 35 U/L (ref 13–60)
LYMPHOCYTES # BLD AUTO: 2.67 10*3/MM3 (ref 0.7–3.1)
LYMPHOCYTES NFR BLD AUTO: 24.6 % (ref 19.6–45.3)
MCH RBC QN AUTO: 29.9 PG (ref 26.6–33)
MCHC RBC AUTO-ENTMCNC: 33.7 G/DL (ref 31.5–35.7)
MCV RBC AUTO: 88.9 FL (ref 79–97)
MONOCYTES # BLD AUTO: 0.75 10*3/MM3 (ref 0.1–0.9)
MONOCYTES NFR BLD AUTO: 6.9 % (ref 5–12)
NEUTROPHILS NFR BLD AUTO: 67.2 % (ref 42.7–76)
NEUTROPHILS NFR BLD AUTO: 7.28 10*3/MM3 (ref 1.7–7)
NITRITE UR QL STRIP: POSITIVE
NRBC BLD AUTO-RTO: 0 /100 WBC (ref 0–0.2)
PH UR STRIP.AUTO: 5.5 [PH] (ref 5–8)
PLATELET # BLD AUTO: 242 10*3/MM3 (ref 140–450)
PMV BLD AUTO: 10.3 FL (ref 6–12)
POTASSIUM SERPL-SCNC: 4 MMOL/L (ref 3.5–5.2)
PROT SERPL-MCNC: 7.8 G/DL (ref 6–8.5)
PROT UR QL STRIP: ABNORMAL
RBC # BLD AUTO: 4.61 10*6/MM3 (ref 3.77–5.28)
RBC # UR STRIP: ABNORMAL /HPF
REF LAB TEST METHOD: ABNORMAL
SODIUM SERPL-SCNC: 139 MMOL/L (ref 136–145)
SP GR UR STRIP: 1.02 (ref 1–1.03)
SQUAMOUS #/AREA URNS HPF: ABNORMAL /HPF
UROBILINOGEN UR QL STRIP: ABNORMAL
WBC # UR STRIP: ABNORMAL /HPF
WBC NRBC COR # BLD: 10.84 10*3/MM3 (ref 3.4–10.8)
WHOLE BLOOD HOLD COAG: NORMAL
WHOLE BLOOD HOLD SPECIMEN: NORMAL

## 2022-06-05 PROCEDURE — 99283 EMERGENCY DEPT VISIT LOW MDM: CPT

## 2022-06-05 PROCEDURE — 87077 CULTURE AEROBIC IDENTIFY: CPT | Performed by: EMERGENCY MEDICINE

## 2022-06-05 PROCEDURE — 87086 URINE CULTURE/COLONY COUNT: CPT | Performed by: EMERGENCY MEDICINE

## 2022-06-05 PROCEDURE — 96372 THER/PROPH/DIAG INJ SC/IM: CPT

## 2022-06-05 PROCEDURE — 87186 SC STD MICRODIL/AGAR DIL: CPT | Performed by: EMERGENCY MEDICINE

## 2022-06-05 PROCEDURE — 80053 COMPREHEN METABOLIC PANEL: CPT | Performed by: EMERGENCY MEDICINE

## 2022-06-05 PROCEDURE — 83690 ASSAY OF LIPASE: CPT | Performed by: EMERGENCY MEDICINE

## 2022-06-05 PROCEDURE — 36415 COLL VENOUS BLD VENIPUNCTURE: CPT

## 2022-06-05 PROCEDURE — 25010000002 CEFTRIAXONE PER 250 MG: Performed by: NURSE PRACTITIONER

## 2022-06-05 PROCEDURE — 84702 CHORIONIC GONADOTROPIN TEST: CPT | Performed by: EMERGENCY MEDICINE

## 2022-06-05 PROCEDURE — 85025 COMPLETE CBC W/AUTO DIFF WBC: CPT | Performed by: EMERGENCY MEDICINE

## 2022-06-05 PROCEDURE — 81001 URINALYSIS AUTO W/SCOPE: CPT | Performed by: EMERGENCY MEDICINE

## 2022-06-05 RX ORDER — CEPHALEXIN 500 MG/1
500 CAPSULE ORAL 2 TIMES DAILY
Qty: 20 CAPSULE | Refills: 0 | Status: SHIPPED | OUTPATIENT
Start: 2022-06-05 | End: 2022-07-05

## 2022-06-05 RX ORDER — PHENAZOPYRIDINE HYDROCHLORIDE 100 MG/1
100 TABLET, FILM COATED ORAL 3 TIMES DAILY PRN
Qty: 15 TABLET | Refills: 0 | Status: SHIPPED | OUTPATIENT
Start: 2022-06-05 | End: 2022-07-05

## 2022-06-05 RX ORDER — SODIUM CHLORIDE 0.9 % (FLUSH) 0.9 %
10 SYRINGE (ML) INJECTION AS NEEDED
Status: DISCONTINUED | OUTPATIENT
Start: 2022-06-05 | End: 2022-06-06 | Stop reason: HOSPADM

## 2022-06-05 RX ADMIN — LIDOCAINE HYDROCHLORIDE 1 G: 10 INJECTION, SOLUTION EPIDURAL; INFILTRATION; INTRACAUDAL; PERINEURAL at 21:37
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
## 2022-06-07 LAB — BACTERIA SPEC AEROBE CULT: ABNORMAL

## 2022-06-07 RX ORDER — CEFDINIR 300 MG/1
300 CAPSULE ORAL 2 TIMES DAILY
Qty: 14 CAPSULE | Refills: 0 | Status: SHIPPED | OUTPATIENT
Start: 2022-06-07 | End: 2022-06-14

## 2022-06-15 RX ORDER — OXYBUTYNIN CHLORIDE 10 MG/1
TABLET, EXTENDED RELEASE ORAL
Qty: 90 TABLET | Refills: 1 | OUTPATIENT
Start: 2022-06-15

## 2022-06-25 DIAGNOSIS — F60.3 BORDERLINE PERSONALITY DISORDER: ICD-10-CM

## 2022-06-25 DIAGNOSIS — F41.1 GENERALIZED ANXIETY DISORDER: ICD-10-CM

## 2022-06-25 DIAGNOSIS — F33.2 SEVERE EPISODE OF RECURRENT MAJOR DEPRESSIVE DISORDER, WITHOUT PSYCHOTIC FEATURES: ICD-10-CM

## 2022-06-25 DIAGNOSIS — F43.10 POST TRAUMATIC STRESS DISORDER (PTSD): ICD-10-CM

## 2022-06-27 RX ORDER — BUPROPION HYDROCHLORIDE 300 MG/1
TABLET ORAL
Qty: 30 TABLET | Refills: 1 | Status: SHIPPED | OUTPATIENT
Start: 2022-06-27 | End: 2022-07-18 | Stop reason: SDUPTHER

## 2022-06-28 ENCOUNTER — TREATMENT (OUTPATIENT)
Dept: PHYSICAL THERAPY | Facility: CLINIC | Age: 42
End: 2022-06-28

## 2022-06-28 DIAGNOSIS — N39.3 STRESS INCONTINENCE IN FEMALE: Primary | ICD-10-CM

## 2022-06-28 DIAGNOSIS — N81.89 PELVIC FLOOR WEAKNESS: ICD-10-CM

## 2022-06-28 PROCEDURE — 97161 PT EVAL LOW COMPLEX 20 MIN: CPT | Performed by: PHYSICAL THERAPIST

## 2022-06-28 NOTE — PROGRESS NOTES
Physical Therapy Initial Evaluation and Plan of Care      Patient: Cruz Nichols   : 1980  Diagnosis/ICD-10 Code:  Stress incontinence in female [N39.3]  Referring practitioner: ERIK Lion  Date of Initial Visit: 2022  Today's Date: 2022  Patient seen for 1 sessions           Subjective Questionnaire: Urinary distress inventory score 18=60-79% impaired      Subjective Evaluation    History of Present Illness  Mechanism of injury: Patient is a 41 yr old female referred to physical therapy with diagnosis of stress urinary incontinence.  Patient reports it has been going on since .  Patient states she also has urinary urge.  Patient is a  and difficult to find bathrooms to urinate.  Patient had 2 vaginal births with difficult last delivery. Patient wears daily protection for urinary leaks.  Patient denies any pain related to urinary issues.  Patient states she a history of frequent UTI's.    Patient Goals  Patient goals for therapy: increased strength  Patient goal: To maintain urinary continence           Objective          Functional Assessment     Comments  Pelvic floor strength 3-/5 only able to maintain 3 seconds; noted slight co-contraction of transverse abdominal with PF contraction  Sensation intact without c/o pain with testing  Abdominal strength 4/5  Verbal consent obtained for internal pelvic exam/treatment,           Assessment & Plan     Assessment  Impairments: impaired physical strength and lacks appropriate home exercise program  Other impairment: Urinary incontinence    Assessment details: Pt presents with limitations, noted by evaluation that impede patient's ability to maintain urinary continence.  The skills of a therapist will be required to safely and effectively implement the following treatment plan to restore maximal level of function.    Prognosis: good    Goals  Plan Goals: 1.Urinary incontinence      LT weeks:The patient will  report 75% decrease in urinary incontinence       Status: New      ST weeks: The patient will report 50% decrease in urinary incontience        Status: New  TREATMENT:  Therapeutic exercise to increase strength and endurance of the pelvic floor, biofeedback as needed to aid in the strengthening process, patient education on extensive HEP, patient education on urge control techniques and pelvic bracing during cough, sneeze, etc.       2.  Pelvic floor weakness   LTG2: 8 weeks: Patient will present with 4/5 strength of the pelvic floor musculature with patient reporting 75% improvement with complaints of urinary incontinence  STATUS:  New   STG2a: 4 weeks:  Strength of the pelvic floor musculature at 3+/5.   STATUS:  New   Treatment:  Therapeutic exercise to increase strength and endurance of the pelvic floor, biofeedback as needed to aid in the strengthening process, patient education on extensive HEP, patient education on urge control techniques and pelvic bracing.     3. Other PT Primary Functional Limitation     LTG 3: 8 weeks:  The patient will demonstrate 20-39% limitation by achieving a score of 9 on the urinary distress inventory.    STATUS:  New   STG 3a: 4 weeks:  The patient will demonstrate 40-59% limitation by achieving a score of 14 on the urinary distress inventory      STATUS:  New       Plan  Therapy options: will be seen for skilled therapy services  Planned therapy interventions: abdominal trunk stabilization, home exercise program, therapeutic activities and strengthening  Frequency: 1x week  Duration in weeks: 8  Treatment plan discussed with: patient      History # of Personal Factors and/or Comorbidities: LOW (0)  Examination of Body System(s): # of elements: LOW (1-2)  Clinical Presentation: STABLE   Clinical Decision Making: LOW       Visit Diagnoses:    ICD-10-CM ICD-9-CM   1. Stress incontinence in female  N39.3 625.6   2. Pelvic floor weakness  N81.89 618.89       Timed:  Manual  Therapy:         mins  21767;  Therapeutic Exercise:         mins  63679;     Neuromuscular Oscar:        mins  47489;    Therapeutic Activity:          mins  09211;     Gait Training:           mins  18822;     Ultrasound:          mins  65824;    Electrical Stimulation:         mins  85058 ( );    Untimed:  Electrical Stimulation:         mins  78321 ( );  Mechanical Traction:         mins  77673;    PT evaluation     Low Eval                        40   Mins  30089  Mod Eval                             Mins  13231  High Eval                           Mins  96062    Timed Treatment:      mins   Total Treatment:     40   mins    PT SIGNATURE: Catie Pastrana, PT     Electronically singed 6/28/2022    [place] PT license: [id number]        Initial Certification  Certification Period: 6/28/2022 thru 9/25/2022  I certify that the therapy services are furnished while this patient is under my care.  The services outlined above are required by this patient, and will be reviewed every 90 days.    PHYSICIAN: Brigitte Bernardo APRN  NPI: [id number]                                      DATE:     Please sign and return via fax to [phone]  Thank you, [facility].

## 2022-07-05 ENCOUNTER — OFFICE VISIT (OUTPATIENT)
Dept: UROLOGY | Facility: CLINIC | Age: 42
End: 2022-07-05

## 2022-07-05 DIAGNOSIS — N39.3 STRESS INCONTINENCE IN FEMALE: Primary | ICD-10-CM

## 2022-07-05 DIAGNOSIS — N39.41 URGE INCONTINENCE: ICD-10-CM

## 2022-07-05 LAB
BILIRUB BLD-MCNC: NEGATIVE MG/DL
CLARITY, POC: CLEAR
COLOR UR: YELLOW
EXPIRATION DATE: ABNORMAL
GLUCOSE UR STRIP-MCNC: NEGATIVE MG/DL
KETONES UR QL: NEGATIVE
LEUKOCYTE EST, POC: ABNORMAL
Lab: ABNORMAL
NITRITE UR-MCNC: NEGATIVE MG/ML
PH UR: 5.5 [PH] (ref 5–8)
PROT UR STRIP-MCNC: NEGATIVE MG/DL
RBC # UR STRIP: ABNORMAL /UL
SP GR UR: 1.02 (ref 1–1.03)
UROBILINOGEN UR QL: NORMAL

## 2022-07-05 PROCEDURE — 99212 OFFICE O/P EST SF 10 MIN: CPT | Performed by: NURSE PRACTITIONER
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
## 2022-07-18 ENCOUNTER — OFFICE VISIT (OUTPATIENT)
Dept: BEHAVIORAL HEALTH | Facility: CLINIC | Age: 42
End: 2022-07-18

## 2022-07-18 VITALS
HEIGHT: 71 IN | SYSTOLIC BLOOD PRESSURE: 120 MMHG | DIASTOLIC BLOOD PRESSURE: 90 MMHG | WEIGHT: 188.4 LBS | BODY MASS INDEX: 26.38 KG/M2

## 2022-07-18 DIAGNOSIS — F60.3 BORDERLINE PERSONALITY DISORDER: ICD-10-CM

## 2022-07-18 DIAGNOSIS — G47.00 INSOMNIA, UNSPECIFIED TYPE: ICD-10-CM

## 2022-07-18 DIAGNOSIS — F43.10 POST TRAUMATIC STRESS DISORDER (PTSD): ICD-10-CM

## 2022-07-18 DIAGNOSIS — F41.1 GENERALIZED ANXIETY DISORDER: ICD-10-CM

## 2022-07-18 DIAGNOSIS — F33.2 SEVERE EPISODE OF RECURRENT MAJOR DEPRESSIVE DISORDER, WITHOUT PSYCHOTIC FEATURES: ICD-10-CM

## 2022-07-18 PROCEDURE — 99213 OFFICE O/P EST LOW 20 MIN: CPT | Performed by: PHYSICIAN ASSISTANT

## 2022-07-18 RX ORDER — BUPROPION HYDROCHLORIDE 300 MG/1
300 TABLET ORAL EVERY MORNING
Qty: 30 TABLET | Refills: 1 | Status: SHIPPED | OUTPATIENT
Start: 2022-07-18 | End: 2022-09-06

## 2022-07-18 RX ORDER — QUETIAPINE FUMARATE 50 MG/1
50 TABLET, FILM COATED ORAL NIGHTLY
Qty: 30 TABLET | Refills: 1 | Status: SHIPPED | OUTPATIENT
Start: 2022-07-18 | End: 2022-09-06 | Stop reason: SDUPTHER

## 2022-07-18 RX ORDER — LAMOTRIGINE 100 MG/1
TABLET ORAL
Qty: 90 TABLET | Refills: 1 | OUTPATIENT
Start: 2022-07-18

## 2022-07-18 RX ORDER — LAMOTRIGINE 100 MG/1
100 TABLET ORAL DAILY
Qty: 30 TABLET | Refills: 1 | Status: SHIPPED | OUTPATIENT
Start: 2022-07-18 | End: 2022-09-06 | Stop reason: SDUPTHER

## 2022-07-18 NOTE — PROGRESS NOTES
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 them.  Pt informed that birth control pills and other hormone-based birth control may not work as well to prevent pregnancy and advised to use some other kind of birth control, such as condoms, while taking this med. Discussed the need for pt to immediately call the office for any new or worsening symptoms, such as rash, worsening depression; feeling nervous or restless; suicidal thoughts or actions; or other changes changes in mood or behavior, and all other concerns. Pt educated on med compliance and the risks of suddenly stopping this medication or missing doses. Pt verbalized understanding and is agreeable to taking Lamictal. Addressed all questions and concerns.     Discussed all risks, benefits, alternatives, and side effects of Quetiapine, including but not limited to GI upset, agitation, dizziness, headache, sexual dysfunction, sedation, weight gain, anticholinergic effects, cataract risk, dyslipidemia, extrapyramidal symptoms (dystonia, drug-induced parkinsonism, akathisia, tardive dyskinesia), lowering of seizure threshold, hematologic abnormalities, hyperglycemia, hypothyroidism, increased mortality in elderly patients with dementia-related psychosis, neuroleptic malignant syndrome, orthostatic hypotension, falls risk in older adults, prolonged QT interval, and temperature  dysregulation. Pt instructed to avoid driving and doing other tasks or actions that require to be alert until knowing how the drug affects them.  Pt educated on the need to practice safe sex while taking this med. Discussed the need for pt to immediately call the office for any new or worsening symptoms, such as worsening depression; feeling nervous or restless; suicidal thoughts or actions; or other changes changes in mood or behavior, and all other concerns. Pt educated on med compliance and the risks of suddenly stopping this medication or missing doses. Pt verbalized understanding and is agreeable to taking Quetiapine. Addressed all questions and concerns.     Discussed all risks, benefits, alternatives, and side effects of Bupropion including but not limited to GI upset (N/V/D, constipation), tachycardia, diaphoresis, weight loss, agitation, dizziness, headache, insomnia, tremor, blurred vision, anorexia, HTN, activation of eben or hypomania, CNS stimulation and neuropsychiatric effects, ocular effects, seizure risk, withdrawal syndrome following abrupt discontinuation, and activation of suicidal ideation and behavior. Pt educated on the need to practice safe sex while taking this med. Discussed the need for pt to immediately call the office for any new or worsening symptoms, such as worsening depression; feeling nervous or restless; suicidal thoughts or actions; or other changes changes in mood or behavior, and all other concerns. Pt educated on med compliance. Pt verbalized understanding and is agreeable to taking Bupropion. Addressed all questions and concerns.       6. Follow up:   F/u in 1 month.    TREATMENT PLAN/GOALS: Continue supportive psychotherapy efforts and medications as indicated. Treatment and medication options discussed during today's visit. Patient ackowledged and verbally consented to continue with current treatment plan and was educated on the importance of compliance with treatment and  follow-up appointments.    MEDICATION ISSUES:  JENI reviewed as expected.  Discussed medication options and treatment plan of prescribed medication as well as the risks, benefits, and side effects including potential falls, possible impaired driving and metabolic adversities among others. Patient is agreeable to call the office with any worsening of symptoms or onset of side effects. Patient is agreeable to call 911 or go to the nearest ER should he/she begin having SI/HI. No medication side effects or related complaints today.            This document has been electronically signed by Jess Carballo PA-C  July 18, 2022 15:03 EDT      Part of this note may be an electronic transcription/translation of spoken language to printed text using the Dragon Dictation System.

## 2022-08-15 ENCOUNTER — TREATMENT (OUTPATIENT)
Dept: PHYSICAL THERAPY | Facility: CLINIC | Age: 42
End: 2022-08-15

## 2022-08-15 DIAGNOSIS — N39.3 STRESS INCONTINENCE IN FEMALE: Primary | ICD-10-CM

## 2022-08-15 DIAGNOSIS — N81.89 PELVIC FLOOR WEAKNESS: ICD-10-CM

## 2022-08-15 PROCEDURE — 97110 THERAPEUTIC EXERCISES: CPT | Performed by: PHYSICAL THERAPIST

## 2022-08-15 NOTE — PROGRESS NOTES
Physical Therapy Progress Note      Patient: Cruz Nichols   : 1980  Referring practitioner: ERIK Lion  Date of Initial Visit: Type: THERAPY  Noted: 2022  Today's Date: 8/15/2022  Patient seen for 2 sessions           Subjective   Cruz Nichols reports: compliant with home exercises as able with work schedule.   Subjective Questionnaire: Urinary distress inventory score 6=20-39% impaired improved from initial score 18=60-79% impaired      Objective          Functional Assessment     Comments  Pelvic floor strength 3+/5 improved 1/2 grade, able to maintain pelvic floor contraction for 8 seconds, from 3 seconds at initial evaluation.      See Exercise, Manual, and Modality Logs for complete treatment.       Assessment & Plan       Goals  Plan Goals: 1.Urinary incontinence      LT weeks:The patient will report 75% decrease in urinary incontinence       Status: Not met      ST weeks: The patient will report 50% decrease in urinary incontience        Status: Met  TREATMENT:  Therapeutic exercise to increase strength and endurance of the pelvic floor, biofeedback as needed to aid in the strengthening process, patient education on extensive HEP, patient education on urge control techniques and pelvic bracing during cough, sneeze, etc.       2.  Pelvic floor weakness   LTG2: 8 weeks: Patient will present with 4/5 strength of the pelvic floor musculature with patient reporting 75% improvement with complaints of urinary incontinence  STATUS:  Ongoing with home exercise program  STG2a: 4 weeks:  Strength of the pelvic floor musculature at 3+/5.   STATUS: Met  Treatment:  Therapeutic exercise to increase strength and endurance of the pelvic floor, biofeedback as needed to aid in the strengthening process, patient education on extensive HEP, patient education on urge control techniques and pelvic bracing.     3. Other PT Primary Functional Limitation                               LTG  3: 8 weeks:  The patient will demonstrate 20-39% limitation by achieving a score of 9 on the urinary distress inventory.                          STATUS:  Met              STG 3a: 4 weeks:  The patient will demonstrate 40-59% limitation by achieving a score of 14 on the urinary distress inventory                            STATUS:  Met    Plan  Treatment plan discussed with: patient  Plan details: Patient discharged to home exercise program.        Visit Diagnoses:    ICD-10-CM ICD-9-CM   1. Stress incontinence in female  N39.3 625.6   2. Pelvic floor weakness  N81.89 618.89       Other: Discharge to home exercise program           Timed:  Manual Therapy:         mins  18207;  Therapeutic Exercise:    25     mins  84165;     Neuromuscular Oscar:        mins  85426;    Therapeutic Activity:          mins  52163;     Gait Training:           mins  86962;     Ultrasound:          mins  80134;    Electrical Stimulation:         mins  36020 ( );    Untimed:  Electrical Stimulation:         mins  04819 ( );  Mechanical Traction:         mins  93223;     Timed Treatment:   25   mins   Total Treatment:     25   mins  Catie Pastrana PT    Electronically singed 8/15/2022      [place] PT license: [id number]  Physical Therapist

## 2022-08-15 NOTE — TELEPHONE ENCOUNTER
----- Message from PARVEEN Garcia sent at 4/11/2018  4:11 PM CDT -----  Positive for BV again.  Flagyl 500mg twice daily twice daily x 7 days.  No alcohol while taking this medication. I will give 1 refill for her to use if infection comes back after completing.   Please refuse-was discontinued by another physician and verified with patient that she is no longer taking.

## 2022-08-17 RX ORDER — OXYBUTYNIN CHLORIDE 10 MG/1
TABLET, EXTENDED RELEASE ORAL
Qty: 90 TABLET | Refills: 1 | OUTPATIENT
Start: 2022-08-17

## 2022-09-06 ENCOUNTER — OFFICE VISIT (OUTPATIENT)
Dept: BEHAVIORAL HEALTH | Facility: CLINIC | Age: 42
End: 2022-09-06

## 2022-09-06 VITALS
HEIGHT: 71 IN | WEIGHT: 192.2 LBS | DIASTOLIC BLOOD PRESSURE: 90 MMHG | SYSTOLIC BLOOD PRESSURE: 110 MMHG | BODY MASS INDEX: 26.91 KG/M2

## 2022-09-06 DIAGNOSIS — F41.1 GENERALIZED ANXIETY DISORDER: ICD-10-CM

## 2022-09-06 DIAGNOSIS — F60.3 BORDERLINE PERSONALITY DISORDER: ICD-10-CM

## 2022-09-06 DIAGNOSIS — F43.10 POST TRAUMATIC STRESS DISORDER (PTSD): ICD-10-CM

## 2022-09-06 DIAGNOSIS — F33.2 SEVERE EPISODE OF RECURRENT MAJOR DEPRESSIVE DISORDER, WITHOUT PSYCHOTIC FEATURES: Primary | ICD-10-CM

## 2022-09-06 DIAGNOSIS — G47.00 INSOMNIA, UNSPECIFIED TYPE: ICD-10-CM

## 2022-09-06 PROBLEM — Z00.00 ROUTINE HEALTH MAINTENANCE: Status: ACTIVE | Noted: 2022-09-06

## 2022-09-06 PROBLEM — N32.89 BLADDER SPASM: Status: ACTIVE | Noted: 2021-10-27

## 2022-09-06 PROBLEM — F33.1 MAJOR DEPRESSIVE DISORDER, RECURRENT EPISODE, MODERATE DEGREE: Status: ACTIVE | Noted: 2021-09-22

## 2022-09-06 PROCEDURE — 99213 OFFICE O/P EST LOW 20 MIN: CPT | Performed by: PHYSICIAN ASSISTANT

## 2022-09-06 PROCEDURE — 90833 PSYTX W PT W E/M 30 MIN: CPT | Performed by: PHYSICIAN ASSISTANT

## 2022-09-06 RX ORDER — BUPROPION HYDROCHLORIDE 150 MG/1
TABLET ORAL
Qty: 30 TABLET | Refills: 2 | Status: SHIPPED | OUTPATIENT
Start: 2022-09-06 | End: 2022-10-11 | Stop reason: SDUPTHER

## 2022-09-06 RX ORDER — LAMOTRIGINE 100 MG/1
100 TABLET ORAL DAILY
Qty: 30 TABLET | Refills: 2 | Status: SHIPPED | OUTPATIENT
Start: 2022-09-06 | End: 2022-10-11 | Stop reason: SDUPTHER

## 2022-09-06 RX ORDER — QUETIAPINE FUMARATE 50 MG/1
50 TABLET, FILM COATED ORAL NIGHTLY
Qty: 30 TABLET | Refills: 2 | Status: SHIPPED | OUTPATIENT
Start: 2022-09-06 | End: 2022-10-11 | Stop reason: SDUPTHER

## 2022-09-06 RX ORDER — BUPROPION HYDROCHLORIDE 300 MG/1
TABLET ORAL
Qty: 30 TABLET | Refills: 2 | Status: SHIPPED | OUTPATIENT
Start: 2022-09-06 | End: 2022-10-11

## 2022-09-06 RX ORDER — BUPROPION HYDROCHLORIDE 300 MG/1
1 TABLET ORAL EVERY MORNING
COMMUNITY
Start: 2022-08-16 | End: 2022-09-06

## 2022-09-06 RX ORDER — OXYBUTYNIN CHLORIDE 15 MG/1
15 TABLET, EXTENDED RELEASE ORAL DAILY
COMMUNITY
Start: 2022-07-19 | End: 2022-09-06

## 2022-09-06 NOTE — PROGRESS NOTES
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 factors include anxiety disorder, mood disorder,  history of suicide attempts or self-harm in the past, and recent psychosocial stressors (pandemic). Protective factors include no family history, denies access to guns/weapons, no present SI, minimal AODA, healthcare seeking, future orientation, willingness to engage in care.  Risk of self-harm chronically is also high, but could be further elevated in the event of treatment noncompliance and/or AODA.  4. Labs/Diagnostics Ordered:   No orders of the defined types were placed in this encounter.    5. Medications:   New Medications Ordered This Visit   Medications   • buPROPion XL (WELLBUTRIN XL) 300 MG 24 hr tablet     Sig: Take 1 tab PO QAM. Take with 150mg tab for total dose of 450mg     Dispense:  30 tablet     Refill:  2   • buPROPion XL (Wellbutrin XL) 150 MG 24 hr tablet     Sig: Take 1 tab PO QAM. Take with 300mg tab for total dose of 450mg     Dispense:  30 tablet     Refill:  2   • lamoTRIgine (LaMICtal) 100 MG tablet     Sig: Take 1 tablet by mouth Daily.     Dispense:  30 tablet     Refill:  2   • QUEtiapine (SEROquel) 50 MG tablet     Sig: Take 1 tablet by mouth Every Night for 30 days.     Dispense:  30 tablet     Refill:  2       Discussed all risks, benefits,  alternatives, and side effects of Lamictal, including but not limited to rash, rebound depressive or manic symptoms if prompt discontinuation, GI upset, agitation, and sedation. Pt instructed to avoid driving and doing other tasks or actions that require to be alert until knowing how the drug affects them.  Pt informed that birth control pills and other hormone-based birth control may not work as well to prevent pregnancy and advised to use some other kind of birth control, such as condoms, while taking this med. Discussed the need for pt to immediately call the office for any new or worsening symptoms, such as rash, worsening depression; feeling nervous or restless; suicidal thoughts or actions; or other changes changes in mood or behavior, and all other concerns. Pt educated on med compliance and the risks of suddenly stopping this medication or missing doses. Pt verbalized understanding and is agreeable to taking Lamictal. Addressed all questions and concerns.     Discussed all risks, benefits, alternatives, and side effects of Quetiapine, including but not limited to GI upset, agitation, dizziness, headache, sexual dysfunction, sedation, weight gain, anticholinergic effects, cataract risk, dyslipidemia, extrapyramidal symptoms (dystonia, drug-induced parkinsonism, akathisia, tardive dyskinesia), lowering of seizure threshold, hematologic abnormalities, hyperglycemia, hypothyroidism, increased mortality in elderly patients with dementia-related psychosis, neuroleptic malignant syndrome, orthostatic hypotension, falls risk in older adults, prolonged QT interval, and temperature dysregulation. Pt instructed to avoid driving and doing other tasks or actions that require to be alert until knowing how the drug affects them.  Pt educated on the need to practice safe sex while taking this med. Discussed the need for pt to immediately call the office for any new or worsening symptoms, such as worsening depression;  feeling nervous or restless; suicidal thoughts or actions; or other changes changes in mood or behavior, and all other concerns. Pt educated on med compliance and the risks of suddenly stopping this medication or missing doses. Pt verbalized understanding and is agreeable to taking Quetiapine. Addressed all questions and concerns.     Discussed all risks, benefits, alternatives, and side effects of Bupropion including but not limited to GI upset (N/V/D, constipation), tachycardia, diaphoresis, weight loss, agitation, dizziness, headache, insomnia, tremor, blurred vision, anorexia, HTN, activation of ebne or hypomania, CNS stimulation and neuropsychiatric effects, ocular effects, seizure risk, withdrawal syndrome following abrupt discontinuation, and activation of suicidal ideation and behavior. Pt educated on the need to practice safe sex while taking this med. Discussed the need for pt to immediately call the office for any new or worsening symptoms, such as worsening depression; feeling nervous or restless; suicidal thoughts or actions; or other changes changes in mood or behavior, and all other concerns. Pt educated on med compliance. Pt verbalized understanding and is agreeable to taking Bupropion. Addressed all questions and concerns.       6. Follow up:   F/u in 1 month.    TREATMENT PLAN/GOALS: Continue supportive psychotherapy efforts and medications as indicated. Treatment and medication options discussed during today's visit. Patient ackowledged and verbally consented to continue with current treatment plan and was educated on the importance of compliance with treatment and follow-up appointments.    MEDICATION ISSUES:  JENI reviewed as expected.  Discussed medication options and treatment plan of prescribed medication as well as the risks, benefits, and side effects including potential falls, possible impaired driving and metabolic adversities among others. Patient is agreeable to call the office with  any worsening of symptoms or onset of side effects. Patient is agreeable to call 911 or go to the nearest ER should he/she begin having SI/HI. No medication side effects or related complaints today.       18 minutes of supportive psychotherapy with goal to strengthen defenses, promote problems solving, restore adaptive functioning and provide symptom relief. The therapeutic alliance was strengthened to encourage the patient to express their thoughts and feelings. Esteem building was enhanced through praise, reassurance, normalizing and encouragement. Coping skills were enhanced using mindfulness (deep breathing, progressive muscle relaxation, imagery, grounding & meditation) and cognitive behavioral strategies (reviewing cognitive distortions, negative self-talk/thought stopping and cognitive restructuring, through de-catastrophizing and examining options/alternatives) to build distress tolerance skills and emotional regulation.  Patient encouraged to practice negative thought stopping, in which the patient recognizes negative thoughts early and attempts to replace these thoughts with positive thoughts. Patient given education on medication side effects, diagnosis/illness and relapse symptoms. Plan to continue supportive psychotherapy in next appointment to provide symptom relief.       This document has been electronically signed by Jess Carballo PA-C  September 6, 2022 14:37 EDT      Part of this note may be an electronic transcription/translation of spoken language to printed text using the Dragon Dictation System.

## 2022-10-11 ENCOUNTER — OFFICE VISIT (OUTPATIENT)
Dept: BEHAVIORAL HEALTH | Facility: CLINIC | Age: 42
End: 2022-10-11

## 2022-10-11 VITALS
BODY MASS INDEX: 25.87 KG/M2 | HEIGHT: 71 IN | DIASTOLIC BLOOD PRESSURE: 80 MMHG | SYSTOLIC BLOOD PRESSURE: 110 MMHG | WEIGHT: 184.8 LBS

## 2022-10-11 DIAGNOSIS — F33.2 SEVERE EPISODE OF RECURRENT MAJOR DEPRESSIVE DISORDER, WITHOUT PSYCHOTIC FEATURES: ICD-10-CM

## 2022-10-11 DIAGNOSIS — F60.3 BORDERLINE PERSONALITY DISORDER: ICD-10-CM

## 2022-10-11 DIAGNOSIS — F41.1 GENERALIZED ANXIETY DISORDER: Primary | ICD-10-CM

## 2022-10-11 DIAGNOSIS — F43.10 POST TRAUMATIC STRESS DISORDER (PTSD): ICD-10-CM

## 2022-10-11 DIAGNOSIS — G47.00 INSOMNIA, UNSPECIFIED TYPE: ICD-10-CM

## 2022-10-11 PROCEDURE — 90833 PSYTX W PT W E/M 30 MIN: CPT | Performed by: PHYSICIAN ASSISTANT

## 2022-10-11 PROCEDURE — 99214 OFFICE O/P EST MOD 30 MIN: CPT | Performed by: PHYSICIAN ASSISTANT

## 2022-10-11 RX ORDER — LAMOTRIGINE 100 MG/1
100 TABLET ORAL DAILY
Qty: 30 TABLET | Refills: 2 | Status: SHIPPED | OUTPATIENT
Start: 2022-10-11 | End: 2022-11-15 | Stop reason: SDUPTHER

## 2022-10-11 RX ORDER — OXYBUTYNIN CHLORIDE 15 MG/1
15 TABLET, EXTENDED RELEASE ORAL DAILY
COMMUNITY
Start: 2022-10-04 | End: 2022-10-11

## 2022-10-11 RX ORDER — BUPROPION HYDROCHLORIDE 300 MG/1
TABLET ORAL
Qty: 30 TABLET | Refills: 2 | Status: SHIPPED | OUTPATIENT
Start: 2022-10-11 | End: 2022-11-15 | Stop reason: SDUPTHER

## 2022-10-11 RX ORDER — BUPROPION HYDROCHLORIDE 150 MG/1
TABLET ORAL
Qty: 30 TABLET | Refills: 2 | Status: SHIPPED | OUTPATIENT
Start: 2022-10-11 | End: 2022-11-15 | Stop reason: SDUPTHER

## 2022-10-11 RX ORDER — QUETIAPINE FUMARATE 50 MG/1
50 TABLET, FILM COATED ORAL NIGHTLY
Qty: 30 TABLET | Refills: 2 | Status: SHIPPED | OUTPATIENT
Start: 2022-10-11 | End: 2022-11-15 | Stop reason: SDUPTHER

## 2022-10-11 NOTE — PROGRESS NOTES
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  2   • buPROPion XL (WELLBUTRIN XL) 300 MG 24 hr tablet     Sig: Take 1 tab PO QAM. Take with 150mg tab for total dose of 450mg     Dispense:  30 tablet     Refill:  2   • lamoTRIgine (LaMICtal) 100 MG tablet     Sig: Take 1 tablet by mouth Daily.     Dispense:  30 tablet     Refill:  2   • QUEtiapine (SEROquel) 50 MG tablet     Sig: Take 1 tablet by mouth Every Night for 30 days.     Dispense:  30 tablet     Refill:  2       Discussed all risks, benefits, alternatives, and side effects of Lamictal, including but not limited to rash, rebound depressive or manic symptoms if prompt discontinuation, GI upset, agitation, and sedation. Pt instructed to avoid driving and doing other tasks or actions that require to be alert until knowing how the drug affects them.  Pt informed that birth control pills and other hormone-based birth control may not work as well to prevent pregnancy and advised to use some other kind of birth control, such as condoms, while taking this med. Discussed the need for pt to immediately call the office for any new or worsening symptoms, such as rash, worsening depression; feeling nervous or restless; suicidal thoughts or actions; or other changes changes in mood or behavior, and all other concerns. Pt educated on med compliance and the risks of suddenly stopping this medication or missing doses.  Pt verbalized understanding and is agreeable to taking Lamictal. Addressed all questions and concerns.     Discussed all risks, benefits, alternatives, and side effects of Quetiapine, including but not limited to GI upset, agitation, dizziness, headache, sexual dysfunction, sedation, weight gain, anticholinergic effects, cataract risk, dyslipidemia, extrapyramidal symptoms (dystonia, drug-induced parkinsonism, akathisia, tardive dyskinesia), lowering of seizure threshold, hematologic abnormalities, hyperglycemia, hypothyroidism, increased mortality in elderly patients with dementia-related psychosis, neuroleptic malignant syndrome, orthostatic hypotension, falls risk in older adults, prolonged QT interval, and temperature dysregulation. Pt instructed to avoid driving and doing other tasks or actions that require to be alert until knowing how the drug affects them.  Pt educated on the need to practice safe sex while taking this med. Discussed the need for pt to immediately call the office for any new or worsening symptoms, such as worsening depression; feeling nervous or restless; suicidal thoughts or actions; or other changes changes in mood or behavior, and all other concerns. Pt educated on med compliance and the risks of suddenly stopping this medication or missing doses. Pt verbalized understanding and is agreeable to taking Quetiapine. Addressed all questions and concerns.     Discussed all risks, benefits, alternatives, and side effects of Bupropion including but not limited to GI upset (N/V/D, constipation), tachycardia, diaphoresis, weight loss, agitation, dizziness, headache, insomnia, tremor, blurred vision, anorexia, HTN, activation of eben or hypomania, CNS stimulation and neuropsychiatric effects, ocular effects, seizure risk, withdrawal syndrome following abrupt discontinuation, and activation of suicidal ideation and behavior. Pt educated on the need to practice safe sex while taking this med.  Discussed the need for pt to immediately call the office for any new or worsening symptoms, such as worsening depression; feeling nervous or restless; suicidal thoughts or actions; or other changes changes in mood or behavior, and all other concerns. Pt educated on med compliance. Pt verbalized understanding and is agreeable to taking Bupropion. Addressed all questions and concerns.       6. Follow up:   F/u in 1 month.    TREATMENT PLAN/GOALS: Continue supportive psychotherapy efforts and medications as indicated. Treatment and medication options discussed during today's visit. Patient ackowledged and verbally consented to continue with current treatment plan and was educated on the importance of compliance with treatment and follow-up appointments.    MEDICATION ISSUES:  JENI reviewed as expected.  Discussed medication options and treatment plan of prescribed medication as well as the risks, benefits, and side effects including potential falls, possible impaired driving and metabolic adversities among others. Patient is agreeable to call the office with any worsening of symptoms or onset of side effects. Patient is agreeable to call 911 or go to the nearest ER should he/she begin having SI/HI. No medication side effects or related complaints today.       16 minutes of supportive psychotherapy with goal to strengthen defenses, promote problems solving, restore adaptive functioning and provide symptom relief. The therapeutic alliance was strengthened to encourage the patient to express their thoughts and feelings. Esteem building was enhanced through praise, reassurance, normalizing and encouragement. Coping skills were enhanced using mindfulness (deep breathing, progressive muscle relaxation, imagery, grounding & meditation) and cognitive behavioral strategies (reviewing cognitive distortions, negative self-talk/thought stopping and cognitive restructuring, through de-catastrophizing and examining  options/alternatives) to build distress tolerance skills and emotional regulation.  Patient encouraged to practice negative thought stopping, in which the patient recognizes negative thoughts early and attempts to replace these thoughts with positive thoughts. Patient given education on medication side effects, diagnosis/illness and relapse symptoms. Plan to continue supportive psychotherapy in next appointment to provide symptom relief.       This document has been electronically signed by Jess Carballo PA-C  October 11, 2022 10:03 EDT      Part of this note may be an electronic transcription/translation of spoken language to printed text using the Dragon Dictation System.

## 2022-10-17 ENCOUNTER — OFFICE VISIT (OUTPATIENT)
Dept: INTERNAL MEDICINE | Facility: CLINIC | Age: 42
End: 2022-10-17

## 2022-10-17 VITALS
HEIGHT: 71 IN | WEIGHT: 182 LBS | BODY MASS INDEX: 25.48 KG/M2 | TEMPERATURE: 98 F | SYSTOLIC BLOOD PRESSURE: 128 MMHG | OXYGEN SATURATION: 97 % | DIASTOLIC BLOOD PRESSURE: 80 MMHG | HEART RATE: 85 BPM

## 2022-10-17 DIAGNOSIS — R26.89 BALANCE DISORDER: Primary | ICD-10-CM

## 2022-10-17 DIAGNOSIS — R73.9 HYPERGLYCEMIA: ICD-10-CM

## 2022-10-17 DIAGNOSIS — F31.31 BIPOLAR AFFECTIVE DISORDER, CURRENTLY DEPRESSED, MILD: ICD-10-CM

## 2022-10-17 LAB
ALBUMIN SERPL-MCNC: 4.4 G/DL (ref 3.5–5.2)
ALBUMIN/GLOB SERPL: 1.6 G/DL
ALP SERPL-CCNC: 59 U/L (ref 39–117)
ALT SERPL W P-5'-P-CCNC: 14 U/L (ref 1–33)
ANION GAP SERPL CALCULATED.3IONS-SCNC: 11 MMOL/L (ref 5–15)
AST SERPL-CCNC: 18 U/L (ref 1–32)
BASOPHILS # BLD AUTO: 0.02 10*3/MM3 (ref 0–0.2)
BASOPHILS NFR BLD AUTO: 0.3 % (ref 0–1.5)
BILIRUB SERPL-MCNC: 0.3 MG/DL (ref 0–1.2)
BUN SERPL-MCNC: 8 MG/DL (ref 6–20)
BUN/CREAT SERPL: 10.4 (ref 7–25)
CALCIUM SPEC-SCNC: 9.5 MG/DL (ref 8.6–10.5)
CHLORIDE SERPL-SCNC: 103 MMOL/L (ref 98–107)
CO2 SERPL-SCNC: 24 MMOL/L (ref 22–29)
CREAT SERPL-MCNC: 0.77 MG/DL (ref 0.57–1)
DEPRECATED RDW RBC AUTO: 40.6 FL (ref 37–54)
EGFRCR SERPLBLD CKD-EPI 2021: 98.3 ML/MIN/1.73
EOSINOPHIL # BLD AUTO: 0.05 10*3/MM3 (ref 0–0.4)
EOSINOPHIL NFR BLD AUTO: 0.9 % (ref 0.3–6.2)
ERYTHROCYTE [DISTWIDTH] IN BLOOD BY AUTOMATED COUNT: 12.1 % (ref 12.3–15.4)
GLOBULIN UR ELPH-MCNC: 2.7 GM/DL
GLUCOSE SERPL-MCNC: 98 MG/DL (ref 65–99)
HCT VFR BLD AUTO: 37.3 % (ref 34–46.6)
HGB BLD-MCNC: 12.3 G/DL (ref 12–15.9)
IMM GRANULOCYTES # BLD AUTO: 0.02 10*3/MM3 (ref 0–0.05)
IMM GRANULOCYTES NFR BLD AUTO: 0.3 % (ref 0–0.5)
LYMPHOCYTES # BLD AUTO: 1.91 10*3/MM3 (ref 0.7–3.1)
LYMPHOCYTES NFR BLD AUTO: 32.8 % (ref 19.6–45.3)
MCH RBC QN AUTO: 30.2 PG (ref 26.6–33)
MCHC RBC AUTO-ENTMCNC: 33 G/DL (ref 31.5–35.7)
MCV RBC AUTO: 91.6 FL (ref 79–97)
MONOCYTES # BLD AUTO: 0.48 10*3/MM3 (ref 0.1–0.9)
MONOCYTES NFR BLD AUTO: 8.2 % (ref 5–12)
NEUTROPHILS NFR BLD AUTO: 3.35 10*3/MM3 (ref 1.7–7)
NEUTROPHILS NFR BLD AUTO: 57.5 % (ref 42.7–76)
NRBC BLD AUTO-RTO: 0 /100 WBC (ref 0–0.2)
PLATELET # BLD AUTO: 277 10*3/MM3 (ref 140–450)
PMV BLD AUTO: 10.6 FL (ref 6–12)
POTASSIUM SERPL-SCNC: 3.9 MMOL/L (ref 3.5–5.2)
PROT SERPL-MCNC: 7.1 G/DL (ref 6–8.5)
RBC # BLD AUTO: 4.07 10*6/MM3 (ref 3.77–5.28)
SODIUM SERPL-SCNC: 138 MMOL/L (ref 136–145)
TSH SERPL DL<=0.05 MIU/L-ACNC: 0.77 UIU/ML (ref 0.27–4.2)
WBC NRBC COR # BLD: 5.83 10*3/MM3 (ref 3.4–10.8)

## 2022-10-17 PROCEDURE — 80175 DRUG SCREEN QUAN LAMOTRIGINE: CPT | Performed by: PHYSICIAN ASSISTANT

## 2022-10-17 PROCEDURE — 84443 ASSAY THYROID STIM HORMONE: CPT | Performed by: PHYSICIAN ASSISTANT

## 2022-10-17 PROCEDURE — 99213 OFFICE O/P EST LOW 20 MIN: CPT | Performed by: PHYSICIAN ASSISTANT

## 2022-10-17 PROCEDURE — 80053 COMPREHEN METABOLIC PANEL: CPT | Performed by: PHYSICIAN ASSISTANT

## 2022-10-17 PROCEDURE — 85025 COMPLETE CBC W/AUTO DIFF WBC: CPT | Performed by: PHYSICIAN ASSISTANT

## 2022-10-17 PROCEDURE — 83036 HEMOGLOBIN GLYCOSYLATED A1C: CPT | Performed by: PHYSICIAN ASSISTANT

## 2022-10-17 RX ORDER — LANOLIN ALCOHOL/MO/W.PET/CERES
1000 CREAM (GRAM) TOPICAL DAILY
COMMUNITY
End: 2022-11-15
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
## 2022-10-18 DIAGNOSIS — R73.9 HYPERGLYCEMIA: Primary | ICD-10-CM

## 2022-10-18 LAB — HBA1C MFR BLD: 5.5 % (ref 4.8–5.6)

## 2022-10-18 NOTE — ASSESSMENT & PLAN NOTE
Discussed pt with Dr. Tejada who is in agreement with plan. Discussed ddx. Neuro exam WNL today. No cardiac symptoms associated with loss of balance. Discussed increase in water intake. Encourage proper use of new seat in vehicle at work and discussing issues/concerns with employer to see options. Discussed if sx persist after trail of increase water intake and not skipping meals, pt will return to clinic for further workup including imaging. Encourage to return to clinic sooner if sx worsen, syncope, loc, dizziness, chest pain, vision changes. Pt understands and agrees with plan.

## 2022-10-19 LAB — LAMOTRIGINE SERPL-MCNC: 4.2 UG/ML (ref 2–20)

## 2022-11-15 ENCOUNTER — OFFICE VISIT (OUTPATIENT)
Dept: BEHAVIORAL HEALTH | Facility: CLINIC | Age: 42
End: 2022-11-15

## 2022-11-15 VITALS
SYSTOLIC BLOOD PRESSURE: 120 MMHG | DIASTOLIC BLOOD PRESSURE: 80 MMHG | HEIGHT: 71 IN | BODY MASS INDEX: 26.23 KG/M2 | WEIGHT: 187.4 LBS

## 2022-11-15 DIAGNOSIS — F41.1 GENERALIZED ANXIETY DISORDER: ICD-10-CM

## 2022-11-15 DIAGNOSIS — F43.10 POST TRAUMATIC STRESS DISORDER (PTSD): ICD-10-CM

## 2022-11-15 DIAGNOSIS — F33.2 SEVERE EPISODE OF RECURRENT MAJOR DEPRESSIVE DISORDER, WITHOUT PSYCHOTIC FEATURES: ICD-10-CM

## 2022-11-15 DIAGNOSIS — F60.3 BORDERLINE PERSONALITY DISORDER: ICD-10-CM

## 2022-11-15 DIAGNOSIS — G47.00 INSOMNIA, UNSPECIFIED TYPE: ICD-10-CM

## 2022-11-15 PROCEDURE — 99214 OFFICE O/P EST MOD 30 MIN: CPT | Performed by: PHYSICIAN ASSISTANT

## 2022-11-15 PROCEDURE — 90833 PSYTX W PT W E/M 30 MIN: CPT | Performed by: PHYSICIAN ASSISTANT

## 2022-11-15 RX ORDER — LAMOTRIGINE 100 MG/1
100 TABLET ORAL DAILY
Qty: 30 TABLET | Refills: 2 | Status: SHIPPED | OUTPATIENT
Start: 2022-11-15 | End: 2022-12-20 | Stop reason: DRUGHIGH

## 2022-11-15 RX ORDER — QUETIAPINE FUMARATE 50 MG/1
50 TABLET, FILM COATED ORAL NIGHTLY
Qty: 30 TABLET | Refills: 2 | Status: SHIPPED | OUTPATIENT
Start: 2022-11-15 | End: 2022-12-20 | Stop reason: SDUPTHER

## 2022-11-15 RX ORDER — BUPROPION HYDROCHLORIDE 300 MG/1
TABLET ORAL
Qty: 30 TABLET | Refills: 2 | Status: SHIPPED | OUTPATIENT
Start: 2022-11-15 | End: 2022-12-20 | Stop reason: SDUPTHER

## 2022-11-15 RX ORDER — BUPROPION HYDROCHLORIDE 150 MG/1
TABLET ORAL
Qty: 30 TABLET | Refills: 2 | Status: SHIPPED | OUTPATIENT
Start: 2022-11-15 | End: 2022-12-20 | Stop reason: SDUPTHER

## 2022-11-15 NOTE — PROGRESS NOTES
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 Every Night for 30 days.  Dispense: 30 tablet; Refill: 2    4. Post traumatic stress disorder (PTSD)  -     buPROPion XL (Wellbutrin XL) 150 MG 24 hr tablet; Take 1 tab PO QAM. Take with 300mg tab for total dose of 450mg  Dispense: 30 tablet; Refill: 2  -     buPROPion XL (WELLBUTRIN XL) 300 MG 24 hr tablet; Take 1 tab PO QAM. Take with 150mg tab for total dose of 450mg  Dispense: 30 tablet; Refill: 2  -     lamoTRIgine (LaMICtal) 100 MG tablet; Take 1 tablet by mouth Daily.  Dispense: 30 tablet; Refill: 2  -     QUEtiapine (SEROquel) 50 MG tablet; Take 1 tablet by mouth Every Night for 30 days.  Dispense: 30 tablet; Refill: 2    5. Insomnia, unspecified type  -     QUEtiapine (SEROquel) 50 MG tablet; Take 1 tablet by mouth Every Night for 30 days.  Dispense: 30 tablet; Refill: 2    Presentation seems to be most consistent with borderline personality disorder, MDD, AUGUST, PTSD, and insomnia.  We will continue Seroquel at current dose to target depression, anxiety, insomnia, PTSD, and overall mood.  We will continue Lamictal at current dose to target borderline personality disorder, depression, anxiety, PTSD, and overall mood.  We will continue Wellbutrin at current dose to target depression, anxiety, and overall mood.  Recommended for patient to journal.  Continue therapy.  Follow up in 1 month.  Addressed all  questions and concerns.      Visit Diagnoses:    ICD-10-CM ICD-9-CM   1. Severe episode of recurrent major depressive disorder, without psychotic features (HCC)  F33.2 296.33   2. Borderline personality disorder (HCC)  F60.3 301.83   3. Generalized anxiety disorder  F41.1 300.02   4. Post traumatic stress disorder (PTSD)  F43.10 309.81   5. Insomnia, unspecified type  G47.00 780.52       PLAN:  1. Safety: No acute safety concerns.   2. Therapy: Pt is doing psychotherapy with [facility].   3. Risk Assessment: Risk of self-harm acutely is high.  Risk factors include anxiety disorder, mood disorder,  history of suicide attempts or self-harm in the past, and recent psychosocial stressors (pandemic). Protective factors include no family history, denies access to guns/weapons, no present SI, minimal AODA, healthcare seeking, future orientation, willingness to engage in care.  Risk of self-harm chronically is also high, but could be further elevated in the event of treatment noncompliance and/or AODA.  4. Labs/Diagnostics Ordered:   No orders of the defined types were placed in this encounter.    5. Medications:   New Medications Ordered This Visit   Medications   • buPROPion XL (Wellbutrin XL) 150 MG 24 hr tablet     Sig: Take 1 tab PO QAM. Take with 300mg tab for total dose of 450mg     Dispense:  30 tablet     Refill:  2   • buPROPion XL (WELLBUTRIN XL) 300 MG 24 hr tablet     Sig: Take 1 tab PO QAM. Take with 150mg tab for total dose of 450mg     Dispense:  30 tablet     Refill:  2   • lamoTRIgine (LaMICtal) 100 MG tablet     Sig: Take 1 tablet by mouth Daily.     Dispense:  30 tablet     Refill:  2   • QUEtiapine (SEROquel) 50 MG tablet     Sig: Take 1 tablet by mouth Every Night for 30 days.     Dispense:  30 tablet     Refill:  2       Discussed all risks, benefits, alternatives, and side effects of Lamictal, including but not limited to rash, rebound depressive or manic symptoms if prompt  discontinuation, GI upset, agitation, and sedation. Pt instructed to avoid driving and doing other tasks or actions that require to be alert until knowing how the drug affects them.  Pt informed that birth control pills and other hormone-based birth control may not work as well to prevent pregnancy and advised to use some other kind of birth control, such as condoms, while taking this med. Discussed the need for pt to immediately call the office for any new or worsening symptoms, such as rash, worsening depression; feeling nervous or restless; suicidal thoughts or actions; or other changes changes in mood or behavior, and all other concerns. Pt educated on med compliance and the risks of suddenly stopping this medication or missing doses. Pt verbalized understanding and is agreeable to taking Lamictal. Addressed all questions and concerns.     Discussed all risks, benefits, alternatives, and side effects of Quetiapine, including but not limited to GI upset, agitation, dizziness, headache, sexual dysfunction, sedation, weight gain, anticholinergic effects, cataract risk, dyslipidemia, extrapyramidal symptoms (dystonia, drug-induced parkinsonism, akathisia, tardive dyskinesia), lowering of seizure threshold, hematologic abnormalities, hyperglycemia, hypothyroidism, increased mortality in elderly patients with dementia-related psychosis, neuroleptic malignant syndrome, orthostatic hypotension, falls risk in older adults, prolonged QT interval, and temperature dysregulation. Pt instructed to avoid driving and doing other tasks or actions that require to be alert until knowing how the drug affects them.  Pt educated on the need to practice safe sex while taking this med. Discussed the need for pt to immediately call the office for any new or worsening symptoms, such as worsening depression; feeling nervous or restless; suicidal thoughts or actions; or other changes changes in mood or behavior, and all other concerns. Pt  educated on med compliance and the risks of suddenly stopping this medication or missing doses. Pt verbalized understanding and is agreeable to taking Quetiapine. Addressed all questions and concerns.     Discussed all risks, benefits, alternatives, and side effects of Bupropion including but not limited to GI upset (N/V/D, constipation), tachycardia, diaphoresis, weight loss, agitation, dizziness, headache, insomnia, tremor, blurred vision, anorexia, HTN, activation of eben or hypomania, CNS stimulation and neuropsychiatric effects, ocular effects, seizure risk, withdrawal syndrome following abrupt discontinuation, and activation of suicidal ideation and behavior. Pt educated on the need to practice safe sex while taking this med. Discussed the need for pt to immediately call the office for any new or worsening symptoms, such as worsening depression; feeling nervous or restless; suicidal thoughts or actions; or other changes changes in mood or behavior, and all other concerns. Pt educated on med compliance. Pt verbalized understanding and is agreeable to taking Bupropion. Addressed all questions and concerns.       6. Follow up:   F/u in 1 month.    TREATMENT PLAN/GOALS: Continue supportive psychotherapy efforts and medications as indicated. Treatment and medication options discussed during today's visit. Patient ackowledged and verbally consented to continue with current treatment plan and was educated on the importance of compliance with treatment and follow-up appointments.    MEDICATION ISSUES:  JENI reviewed as expected.  Discussed medication options and treatment plan of prescribed medication as well as the risks, benefits, and side effects including potential falls, possible impaired driving and metabolic adversities among others. Patient is agreeable to call the office with any worsening of symptoms or onset of side effects. Patient is agreeable to call 911 or go to the nearest ER should he/she begin  having SI/HI. No medication side effects or related complaints today.       22 minutes of supportive psychotherapy with goal to strengthen defenses, promote problems solving, restore adaptive functioning and provide symptom relief. The therapeutic alliance was strengthened to encourage the patient to express their thoughts and feelings. Esteem building was enhanced through praise, reassurance, normalizing and encouragement. Coping skills were enhanced using mindfulness (deep breathing, progressive muscle relaxation, imagery, grounding & meditation) and cognitive behavioral strategies (reviewing cognitive distortions, negative self-talk/thought stopping and cognitive restructuring, through de-catastrophizing and examining options/alternatives) to build distress tolerance skills and emotional regulation.  Patient encouraged to practice negative thought stopping, in which the patient recognizes negative thoughts early and attempts to replace these thoughts with positive thoughts. Patient given education on medication side effects, diagnosis/illness and relapse symptoms. Plan to continue supportive psychotherapy in next appointment to provide symptom relief.       This document has been electronically signed by Jess Carballo PA-C  November 15, 2022 09:43 EST      Part of this note may be an electronic transcription/translation of spoken language to printed text using the Dragon Dictation System.

## 2022-12-19 NOTE — PATIENT INSTRUCTIONS
1.  Please return to clinic at your next scheduled visit.  Please contact the clinic ([phone]) at least 24 hours prior in the event you need to cancel.  2.  Do no harm to yourself or others.    3.  Avoid alcohol and drugs.    4.  Take all medications as prescribed.  Please contact the clinic with any concerns. If you are in need of medication refills, please call the clinic at [phone].    5. Should you want to get in touch with your provider, ERIK Pacheco, please contact the office ([phone]), and staff will be able to page Janine directly.  6. In the event you have personal crisis, contact the following crisis numbers: Suicide Prevention Hotline 1-426.290.9287; [facility] Helpline 3-012-612-[facility]; [facility] Room [phone]; text HELLO to 820015; or 571.     SPECIFIC RECOMMENDATIONS:     1.      Medications discussed at this encounter:               Seroquel 50 mg          Wellbutrin  mg          Lamictal 25 mg daily for 3 weeks, if tolerated increase to 50 mg daily     2.      Psychotherapy recommendations: We will discuss referral to therapy at future appointment     3.     Return to clinic: 6 weeks

## 2022-12-19 NOTE — PROGRESS NOTES
"[facility]  Medication Management Follow-up  This patient is in a transition of care to me from Jess Carballo PA-C.  I have thoroughly reviewed the patient's electronic medical record. In transitioning to my practice, this visit will be to form a therapeutic alliance while providing continued medication management and psychotherapy modalities.    Referring Provider:  Ifeoma Henry, APRN  [address]  [facility] [address]  [place] [address]    Vital Signs:   /78   Pulse 99   Ht 180.3 cm (71\")   Wt 78.7 kg (173 lb 8 oz)   SpO2 98%   BMI 24.20 kg/m²    Visit Diagnoses:    ICD-10-CM ICD-9-CM   1. Severe episode of recurrent major depressive disorder, without psychotic features (HCC)  F33.2 296.33   2. Borderline personality disorder (HCC)  F60.3 301.83   3. Generalized anxiety disorder  F41.1 300.02   4. Post traumatic stress disorder (PTSD)  F43.10 309.81   5. Insomnia, unspecified type  G47.00 780.52       Chief Complaint: \"I ran out of my Lamictal\"  Anxiety.  PTSD.  Insomnia.  MDD.  BPD.    History of Present Illness:   Cruz Nichols is a 42 y.o. female who presents today for follow up concerning anxiety, PTSD, insomnia, MDD, BPD. Patient states she ran out of Lamictal on Friday and did not call the office, has not been taking only for few days, however all these medicines were working well for her.  She has started to notice some mood changes and irritability since not taking the Lamictal.  She was encouraged to, in the future, call the office and make sure that we give her refills on these medicines as they should not be discontinued abruptly.  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  She has also noticed some irritability with coworkers however she has been implementing some positive coping skills and not allowing negative thoughts to cause interpersonal issues at work.  " Denies SI or HI.  Denies AVH.  Patient states she has been sleeping well on the quetiapine.    Record Review is below for 12/20/2022 : No encounters noted since last visit on 11/15/2022, hemoglobin A1c, TSH, CBC, and CMP reviewed were all reassuring and within normal limits.  Lamotrigine level on 10/17/2022 was 4.2, and within the expected range. 12/23/2020 EKG [id number].   Per Jess Carballo PA-C on 11/15/2022- (Presentation seems to be most consistent with borderline personality disorder, MDD, AUGUST, PTSD, and insomnia.  We will continue Seroquel at current dose to target depression, anxiety, insomnia, PTSD, and overall mood.  We will continue Lamictal at current dose to target borderline personality disorder, depression, anxiety, PTSD, and overall mood.  We will continue Wellbutrin at current dose to target depression, anxiety, and overall mood.  Recommended for patient to journal.  Continue therapy.  Follow up in 1 month.  Addressed all questions and concerns. Previous Medical Record Review: Reviewed office visit note from 10/11/21, pt f/u for mood. She recently lost custody of her youngest daughter 2/2021. She has good support from her 22 year old daughter. Pt has been seeing a therapist at [facility]. She had a break down at work and [employer] put her on paid leave. Pt denies SI and HI. She has been seeing Marita VAELNCIA at [facility]. She has not been taking prescribed Lithium and Wellbutrin. She has been taking OTC stress meds. Pt reports previously being diagnosed with bipolar but does not think she has that. Pt referred to psych for PTSD, borderline personality.      Reviewed office visit note from 10/10/19, pt reports that treatment is going well since she has restarted Lithium.     Reviewed office visit note from 2/26/19, pt reports good control of anxiety with Wellbutrin. Pt getting Abilify injections, which was required by court.    I have thoroughly reviewed the patient's electronic  medical record to include previous encounters, notes, medications, labs, imaging, and EKG's.  Pertinent information is included in this note.    Labs:  WBC   Date Value Ref Range Status   10/17/2022 5.83 3.40 - 10.80 10*3/mm3 Final     Platelets   Date Value Ref Range Status   10/17/2022 277 140 - 450 10*3/mm3 Final     Hemoglobin   Date Value Ref Range Status   10/17/2022 12.3 12.0 - 15.9 g/dL Final     Hematocrit   Date Value Ref Range Status   10/17/2022 37.3 34.0 - 46.6 % Final     Glucose   Date Value Ref Range Status   10/17/2022 98 65 - 99 mg/dL Final     Creatinine   Date Value Ref Range Status   10/17/2022 0.77 0.57 - 1.00 mg/dL Final     ALT (SGPT)   Date Value Ref Range Status   10/17/2022 14 1 - 33 U/L Final     AST (SGOT)   Date Value Ref Range Status   10/17/2022 18 1 - 32 U/L Final     BUN   Date Value Ref Range Status   10/17/2022 8 6 - 20 mg/dL Final     eGFR   Date Value Ref Range Status   10/17/2022 98.3 >60.0 mL/min/1.73 Final     Comment:     National Kidney Foundation and American Society of Nephrology (ASN) Task Force recommended calculation based on the Chronic Kidney Disease Epidemiology Collaboration (CKD-EPI) equation refit without adjustment for race.     Total Cholesterol   Date Value Ref Range Status   03/23/2022 163 0 - 200 mg/dL Final     Triglycerides   Date Value Ref Range Status   03/23/2022 108 0 - 150 mg/dL Final     HDL Cholesterol   Date Value Ref Range Status   03/23/2022 47 40 - 60 mg/dL Final     LDL Cholesterol    Date Value Ref Range Status   03/23/2022 96 0 - 100 mg/dL Final     VLDL Cholesterol   Date Value Ref Range Status   03/23/2022 20 5 - 40 mg/dL Final     LDL/HDL Ratio   Date Value Ref Range Status   03/23/2022 2.01  Final     Hemoglobin A1C   Date Value Ref Range Status   10/17/2022 5.50 4.80 - 5.60 % Final     TSH   Date Value Ref Range Status   10/17/2022 0.767 0.270 - 4.200 uIU/mL Final     Free T4   Date Value Ref Range Status   10/05/2020 1.2 0.9 - 1.8 ng/dL  Final      Imaging Results:  No Images in the past 120 days found..  EKG Results:  SCANNED EKG (12/23/2020)  [id number]    Current Medications:   Current Outpatient Medications   Medication Sig Dispense Refill   • Acetaminophen (TYLENOL 8 HOUR PO) Take  by mouth. AS NEEDED     • buPROPion XL (Wellbutrin XL) 150 MG 24 hr tablet Take 1 tab PO QAM. Take with 300mg tab for total dose of 450mg 30 tablet 2   • buPROPion XL (WELLBUTRIN XL) 300 MG 24 hr tablet Take 1 tab PO QAM. Take with 150mg tab for total dose of 450mg 30 tablet 2   • Methylcobalamin (B12-ACTIVE PO) Take  by mouth. OTC GUMMIES + TABLETS     • QUEtiapine (SEROquel) 50 MG tablet Take 1 tablet by mouth Every Night for 30 days. 30 tablet 2   • solifenacin (VESICARE) 5 MG tablet Take 1 tablet by mouth Daily. 90 tablet 3   • LaMICtal 25 MG tablet Take 1 tablet by mouth Daily for 30 days. Take 25 mg by mouth daily for 3 weeks, if tolerated, then increase to 50 mg by mouth daily. 45 tablet 1     No current facility-administered medications for this visit.       Problem List:  Patient Active Problem List   Diagnosis   • Major depressive disorder, recurrent episode, moderate degree ([facility])   • PTSD (post-traumatic stress disorder)   • Borderline personality disorder in adult ([facility])   • Allergic rhinitis   • Bipolar disorder ([facility])   • Migraine without aura   • Urge incontinence   • Urinary incontinence   • Bladder spasm   • Body mass index (BMI) of 25.0-25.9 in adult   • Routine health maintenance   • Borderline personality disorder in adult ([facility])   • Major depressive disorder, recurrent episode, moderate degree ([facility])   • Balance disorder       Allergy:   Allergies   Allergen Reactions   • Codeine Seizure     Other reaction(s): Seizure   • Prednisone Anaphylaxis        Discontinued Medications:  Medications Discontinued During This Encounter   Medication Reason   • lamoTRIgine (LaMICtal) 100 MG tablet Dose adjustment   • buPROPion XL (Wellbutrin XL) 150 MG 24 hr tablet  "Reorder   • buPROPion XL (WELLBUTRIN XL) 300 MG 24 hr tablet Reorder   • QUEtiapine (SEROquel) 50 MG tablet Reorder       PHQ-9 Depression Screening  PHQ-9 Total Score: 14    Little interest or pleasure in doing things? 1-->several days   Feeling down, depressed, or hopeless? 2-->more than half the days   Trouble falling or staying asleep, or sleeping too much? 3-->nearly every day   Feeling tired or having little energy? 1-->several days   Poor appetite or overeating? 0-->not at all   Feeling bad about yourself - or that you are a failure or have let yourself or your family down? 3-->nearly every day   Trouble concentrating on things, such as reading the newspaper or watching television? 1-->several days   Moving or speaking so slowly that other people could have noticed? Or the opposite - being so fidgety or restless that you have been moving around a lot more than usual? 3-->nearly every day   Thoughts that you would be better off dead, or of hurting yourself in some way? 0-->not at all   PHQ-9 Total Score 14     AUGUST-7  Feeling nervous, anxious or on edge: Nearly every day  Not being able to stop or control worrying: Nearly every day  Worrying too much about different things: Nearly every day  Trouble Relaxing: Several days  Being so restless that it is hard to sit still: Several days  Feeling afraid as if something awful might happen: Nearly every day  Becoming easily annoyed or irritable: Nearly every day  AUGUST 7 Total Score: 17  If you checked any problems, how difficult have these problems made it for you to do your work, take care of things at home, or get along with other people: Somewhat difficult    Mental Status Exam:   Appearance: good eye contact, normal street clothes, groomed, sitting in chair   Behavior: pleasant and cooperative, patient became tearful a couple times during therapy interview  Motor: no abnormal  Speech: Hyperverbal, increased rate and tone  Mood: \" Euphoric\" also labile  Affect: " euthymic  Thought Content: negative suicidal ideations, negative homicidal ideations, negative obsessions  Perceptions: negative auditory hallucinations, negative visual hallucinations, negative delusions, negative paranoia  Thought Process: goal directed, linear  Insight/Judgement: fair/fair  Cognition: grossly intact  Attention: intact  Orientation: person, place, time and situation  Memory: intact    Review of Systems:   Constitutional: Denies fatigue, night sweats  Eyes: Denies double vision, blurred vision  HENT: Denies vertigo, recent head injury  Cardiovascular: Denies chest pain, irregular heartbeats  Respiratory: Denies productive cough, shortness of breath  Gastrointestinal: Denies nausea, vomiting  Genitourinary: Denies dysuria, urinary retention  Integument: Denies hair growth change, new skin lesions  Neurologic: Denies altered mental status, seizures  Musculoskeletal: Denies joint swelling, limitation of motion  Endocrine: Denies cold intolerance, heat intolerance  Psychiatric: See mental status exam  Allergic-immunologic: Denies frequent illnesses    Psychotherapy: 17 minutes of supportive psychotherapy with goal to strengthen defenses, promote problems solving, restore adaptive functioning and provide symptom relief. The therapeutic alliance was strengthened to encourage the patient to express their thoughts and feelings. Esteem building was enhanced through praise, reassurance, normalizing and encouragement. Coping skills were enhanced to build distress tolerance skills and emotional regulation. Allowed patient to freely discuss issues without interruption or judgement with unconditional positive regard, active listening skills, and empathy. Provided a safe, confidential environment to facilitate the development of a positive therapeutic relationship and encourage open, honest communication. Assisted patient in identifying risk factors which would indicate the need for higher level of care including  "thoughts to harm self or others and/or self-harming behavior and encouraged patient to contact this office, call 911, or present to the nearest emergency room should any of these events occur. Assisted patient in processing session content; acknowledged and normalized patient’s thoughts, feelings, and concerns by utilizing a person-centered approach in efforts to build appropriate rapport and a positive therapeutic relationship with open and honest communication. Patient given education on medication side effects, diagnosis/illness and relapse symptoms. Plan to continue supportive psychotherapy in next appointment to provide symptom relief.    Struggles: Patient is having a rough time this time of year because of not having custody of her daughter.  She has no way to contact her or find out how she is doing.  Patient is struggling with some potential interpersonal conflicts at work.  Work is a severe stressor for her at this time due to it being \"peak period\" at [employer] and she is responsible for deliveries.    Victories: Patient has verbalized some positive coping skills for dealing with her negative feelings towards these coworkers.      Diagnoses and all orders for this visit:    1. Severe episode of recurrent major depressive disorder, without psychotic features (HCC) (Primary)  -     LaMICtal 25 MG tablet; Take 1 tablet by mouth Daily for 30 days. Take 25 mg by mouth daily for 3 weeks, if tolerated, then increase to 50 mg by mouth daily.  Dispense: 45 tablet; Refill: 1  -     buPROPion XL (Wellbutrin XL) 150 MG 24 hr tablet; Take 1 tab PO QAM. Take with 300mg tab for total dose of 450mg  Dispense: 30 tablet; Refill: 2  -     buPROPion XL (WELLBUTRIN XL) 300 MG 24 hr tablet; Take 1 tab PO QAM. Take with 150mg tab for total dose of 450mg  Dispense: 30 tablet; Refill: 2  -     QUEtiapine (SEROquel) 50 MG tablet; Take 1 tablet by mouth Every Night for 30 days.  Dispense: 30 tablet; Refill: 2    2. Borderline " personality disorder (HCC)  -     LaMICtal 25 MG tablet; Take 1 tablet by mouth Daily for 30 days. Take 25 mg by mouth daily for 3 weeks, if tolerated, then increase to 50 mg by mouth daily.  Dispense: 45 tablet; Refill: 1  -     buPROPion XL (Wellbutrin XL) 150 MG 24 hr tablet; Take 1 tab PO QAM. Take with 300mg tab for total dose of 450mg  Dispense: 30 tablet; Refill: 2  -     buPROPion XL (WELLBUTRIN XL) 300 MG 24 hr tablet; Take 1 tab PO QAM. Take with 150mg tab for total dose of 450mg  Dispense: 30 tablet; Refill: 2  -     QUEtiapine (SEROquel) 50 MG tablet; Take 1 tablet by mouth Every Night for 30 days.  Dispense: 30 tablet; Refill: 2    3. Generalized anxiety disorder  -     LaMICtal 25 MG tablet; Take 1 tablet by mouth Daily for 30 days. Take 25 mg by mouth daily for 3 weeks, if tolerated, then increase to 50 mg by mouth daily.  Dispense: 45 tablet; Refill: 1  -     buPROPion XL (Wellbutrin XL) 150 MG 24 hr tablet; Take 1 tab PO QAM. Take with 300mg tab for total dose of 450mg  Dispense: 30 tablet; Refill: 2  -     buPROPion XL (WELLBUTRIN XL) 300 MG 24 hr tablet; Take 1 tab PO QAM. Take with 150mg tab for total dose of 450mg  Dispense: 30 tablet; Refill: 2  -     QUEtiapine (SEROquel) 50 MG tablet; Take 1 tablet by mouth Every Night for 30 days.  Dispense: 30 tablet; Refill: 2    4. Post traumatic stress disorder (PTSD)  -     LaMICtal 25 MG tablet; Take 1 tablet by mouth Daily for 30 days. Take 25 mg by mouth daily for 3 weeks, if tolerated, then increase to 50 mg by mouth daily.  Dispense: 45 tablet; Refill: 1  -     buPROPion XL (Wellbutrin XL) 150 MG 24 hr tablet; Take 1 tab PO QAM. Take with 300mg tab for total dose of 450mg  Dispense: 30 tablet; Refill: 2  -     buPROPion XL (WELLBUTRIN XL) 300 MG 24 hr tablet; Take 1 tab PO QAM. Take with 150mg tab for total dose of 450mg  Dispense: 30 tablet; Refill: 2  -     QUEtiapine (SEROquel) 50 MG tablet; Take 1 tablet by mouth Every Night for 30 days.   Dispense: 30 tablet; Refill: 2    5. Insomnia, unspecified type  -     QUEtiapine (SEROquel) 50 MG tablet; Take 1 tablet by mouth Every Night for 30 days.  Dispense: 30 tablet; Refill: 2         PLAN:   Presentation seems to be most consistent with borderline personality disorder, MDD, AUGUST, PTSD, and insomnia..  Will continue quetiapine, Wellbutrin XL, Lamictal for management of depression, anxiety, insomnia, and overall mood.  However we will retitrate Lamictal from a low-dose of 25 mg for safety.  I extensively discussed the importance of her contacting the office should she need refills on her medications as it would be unsafe to discontinue them abruptly. Patient verbalized understanding and is agreeable to this plan.  Follow-up in 6 weeks.  Addressed all questions and concerns.  Continue psychotherapeutic modalities as indicated.    TREATMENT PLAN/GOALS: Goal is to have decrease in anxiety, depression, PTSD.  Improved sleep.  Continue supportive psychotherapy efforts and medications as indicated. Treatment and medication options discussed during today's visit. Patient acknowledged and verbally consented to continue with current treatment plan and was educated on the importance of compliance with treatment and follow-up appointments.    1. Safety: No acute safety concerns.   2. Therapy: Will provide therapy during our medication management, we will discuss referral at future appointment.  3. Risk Assessment: Risk of self-harm acutely is moderate.  Risk factors include anxiety disorder, mood disorder, and recent psychosocial stressors (pandemic). Protective factors include no family history, denies access to guns/weapons, no present SI, no history of suicide attempts or self-harm in the past, minimal AODA, healthcare seeking, future orientation, willingness to engage in care.  Risk of self-harm chronically is also moderate, but could be further elevated in the event of treatment noncompliance and/or  AODA.  4. Labs/studies: No labs/studies ordered at this time  Medications:   New Medications Ordered This Visit   Medications   • LaMICtal 25 MG tablet     Sig: Take 1 tablet by mouth Daily for 30 days. Take 25 mg by mouth daily for 3 weeks, if tolerated, then increase to 50 mg by mouth daily.     Dispense:  45 tablet     Refill:  1   • buPROPion XL (Wellbutrin XL) 150 MG 24 hr tablet     Sig: Take 1 tab PO QAM. Take with 300mg tab for total dose of 450mg     Dispense:  30 tablet     Refill:  2   • buPROPion XL (WELLBUTRIN XL) 300 MG 24 hr tablet     Sig: Take 1 tab PO QAM. Take with 150mg tab for total dose of 450mg     Dispense:  30 tablet     Refill:  2   • QUEtiapine (SEROquel) 50 MG tablet     Sig: Take 1 tablet by mouth Every Night for 30 days.     Dispense:  30 tablet     Refill:  2   5.    Medication Education:   WELLBUTRIN XL (BUPROPION) Risks, benefits, alternatives discussed with patient including nausea, GI upset, increased energy, exacerbation of irritability, insomnia, lowering of seizure threshold.  After discussion of these risks and benefits, the patient voiced understanding and agreed to proceed.  LAMICTAL (LAMOTRIGINE) Risks, benefits, alternatives discussed with patient including rash, rebound depressive or manic symptoms if prompt discontinuation, GI upset, agitation, sedation/falls risk.  After discussion of these risks and benefits, patient voiced understanding and agreed to proceed.  SEROQUEL (QUETIAPINE) Risks, benefits, alternatives discussed with patient including nausea and vomiting, GI upset, sedation, dizziness, falls, akathisia, hypotension, increased appetite, lowering of seizure threshold, theoretical risk of tardive dyskinesia, extrapyramidal symptoms, restless legs syndrome. After discussion of these risks and benefits, the patient voiced understanding and agreed to proceed.        Medication Issues:      Discussed medication options and treatment plan of prescribed medication as  well as the risks, benefits, and side effects including potential falls, possible impaired driving and metabolic adversities among others. Patient is agreeable to call the office with any worsening of symptoms or onset of side effects. Patient is agreeable to call 911 or go to the nearest ER should he/she begin having SI/HI.  Discussed not discontinuing medications abruptly.  No medication side effects or related complaints today.   6. Follow-up: Return in about 6 weeks (around 1/31/2023).         This document has been electronically signed by ERIK Pacheco  December 20, 2022 16:54 EST    Please note that portions of this note were completed with a voice recognition program.  Copied text in this note has been reviewed and is accurate as of 12/20/22

## 2022-12-20 ENCOUNTER — OFFICE VISIT (OUTPATIENT)
Dept: BEHAVIORAL HEALTH | Facility: CLINIC | Age: 42
End: 2022-12-20

## 2022-12-20 VITALS
HEART RATE: 99 BPM | OXYGEN SATURATION: 98 % | WEIGHT: 173.5 LBS | HEIGHT: 71 IN | DIASTOLIC BLOOD PRESSURE: 78 MMHG | SYSTOLIC BLOOD PRESSURE: 125 MMHG | BODY MASS INDEX: 24.29 KG/M2

## 2022-12-20 DIAGNOSIS — F43.10 POST TRAUMATIC STRESS DISORDER (PTSD): ICD-10-CM

## 2022-12-20 DIAGNOSIS — F41.1 GENERALIZED ANXIETY DISORDER: ICD-10-CM

## 2022-12-20 DIAGNOSIS — F33.2 SEVERE EPISODE OF RECURRENT MAJOR DEPRESSIVE DISORDER, WITHOUT PSYCHOTIC FEATURES: Primary | ICD-10-CM

## 2022-12-20 DIAGNOSIS — F60.3 BORDERLINE PERSONALITY DISORDER: ICD-10-CM

## 2022-12-20 DIAGNOSIS — G47.00 INSOMNIA, UNSPECIFIED TYPE: ICD-10-CM

## 2022-12-20 PROCEDURE — 99214 OFFICE O/P EST MOD 30 MIN: CPT

## 2022-12-20 RX ORDER — LAMOTRIGINE 25 MG/1
25 TABLET ORAL DAILY
Qty: 45 TABLET | Refills: 1 | Status: SHIPPED | OUTPATIENT
Start: 2022-12-20 | End: 2023-01-12

## 2022-12-20 RX ORDER — QUETIAPINE FUMARATE 50 MG/1
50 TABLET, FILM COATED ORAL NIGHTLY
Qty: 30 TABLET | Refills: 2 | Status: SHIPPED | OUTPATIENT
Start: 2022-12-20 | End: 2023-02-07 | Stop reason: SDUPTHER

## 2022-12-20 RX ORDER — BUPROPION HYDROCHLORIDE 300 MG/1
TABLET ORAL
Qty: 30 TABLET | Refills: 2 | Status: SHIPPED | OUTPATIENT
Start: 2022-12-20

## 2022-12-20 RX ORDER — BUPROPION HYDROCHLORIDE 150 MG/1
TABLET ORAL
Qty: 30 TABLET | Refills: 2 | Status: SHIPPED | OUTPATIENT
Start: 2022-12-20 | End: 2023-02-28 | Stop reason: DRUGHIGH

## 2023-01-11 ENCOUNTER — OFFICE VISIT (OUTPATIENT)
Dept: INTERNAL MEDICINE | Facility: CLINIC | Age: 43
End: 2023-01-11
Payer: COMMERCIAL

## 2023-01-11 VITALS
RESPIRATION RATE: 16 BRPM | WEIGHT: 174 LBS | HEIGHT: 71 IN | BODY MASS INDEX: 24.36 KG/M2 | TEMPERATURE: 97.5 F | HEART RATE: 93 BPM | DIASTOLIC BLOOD PRESSURE: 82 MMHG | SYSTOLIC BLOOD PRESSURE: 128 MMHG | OXYGEN SATURATION: 97 %

## 2023-01-11 DIAGNOSIS — R22.1 NECK SWELLING: Primary | ICD-10-CM

## 2023-01-11 DIAGNOSIS — R42 LIGHTHEADED: ICD-10-CM

## 2023-01-11 DIAGNOSIS — F31.31 BIPOLAR AFFECTIVE DISORDER, CURRENTLY DEPRESSED, MILD: ICD-10-CM

## 2023-01-11 DIAGNOSIS — F33.1 MAJOR DEPRESSIVE DISORDER, RECURRENT EPISODE, MODERATE DEGREE: ICD-10-CM

## 2023-01-11 PROCEDURE — 99214 OFFICE O/P EST MOD 30 MIN: CPT | Performed by: PHYSICIAN ASSISTANT

## 2023-01-11 RX ORDER — OXYBUTYNIN CHLORIDE 15 MG/1
15 TABLET, EXTENDED RELEASE ORAL DAILY
COMMUNITY
Start: 2022-12-29 | End: 2023-02-21
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
## 2023-01-12 DIAGNOSIS — F41.1 GENERALIZED ANXIETY DISORDER: ICD-10-CM

## 2023-01-12 DIAGNOSIS — F60.3 BORDERLINE PERSONALITY DISORDER: ICD-10-CM

## 2023-01-12 DIAGNOSIS — F33.2 SEVERE EPISODE OF RECURRENT MAJOR DEPRESSIVE DISORDER, WITHOUT PSYCHOTIC FEATURES: ICD-10-CM

## 2023-01-12 DIAGNOSIS — F43.10 POST TRAUMATIC STRESS DISORDER (PTSD): ICD-10-CM

## 2023-01-12 PROBLEM — F33.1 MAJOR DEPRESSIVE DISORDER, RECURRENT EPISODE, MODERATE DEGREE: Status: RESOLVED | Noted: 2021-09-22 | Resolved: 2023-01-12

## 2023-01-12 PROBLEM — R42 LIGHTHEADED: Status: ACTIVE | Noted: 2023-01-12

## 2023-01-12 RX ORDER — LAMOTRIGINE 25 MG/1
TABLET ORAL
Qty: 45 TABLET | Refills: 1 | Status: SHIPPED | OUTPATIENT
Start: 2023-01-12 | End: 2023-02-07 | Stop reason: DRUGHIGH

## 2023-01-12 NOTE — ASSESSMENT & PLAN NOTE
Mood not at goal, currently seeing psych who manages meds. Recently had med change. She will contact psych to discuss and follow up as directed. To er if sx worsen, si/hi

## 2023-01-12 NOTE — TELEPHONE ENCOUNTER
"PT(PATIENT) VERIFIED     PT(PATIENT) STATES SHE DID NOT INCREASE THE MEDICATION     PT(PATIENT) STATES SHE HAS ONLY BEEN TAKING 1 PILL PER DAY     PT(PATIENT) STATES THE 25MG IS WORKING GREAT FOR HER, BUT SHE HAS HAD SOME FORGETFULL AND A \"WANDERING MIND\"    PROVIDER PLEASE ADVISE   "

## 2023-01-12 NOTE — ASSESSMENT & PLAN NOTE
Discussed ddx lightheadedness. increase water intake. discussed importance of 3 meals daily and not skipping meals. Limit/avoid caffeine. RTC if sx return/worsen, syncope, loc, seizure, cp, palpitations, unilateral weakness, confusion. Pt understands and agrees

## 2023-01-12 NOTE — TELEPHONE ENCOUNTER
Rx approved and sent to preferred pharmacy.    Will discuss medications with patient at next follow-up visit, unless she wants to schedule an earlier appointment.

## 2023-01-12 NOTE — TELEPHONE ENCOUNTER
Anticonvulsants Protocol Passed 01/12/2023 01:30 PM   Protocol Details  No active pregnancy on record    No positive pregnancy test in past 12 months    Visit with authorizing provider in past 12 months or upcoming 30 days    CBC in past 12 months        Sig: Take 1 tablet by mouth Daily for 30 days. Take 25 mg by mouth daily for 3 weeks, if tolerated, then increase to 50 mg by mouth daily    NEXT APPT WITH PROVIDER  Appointment with Janine Hummel APRN (01/31/2023)    LAST MED REFILL  LaMICtal 25 MG tablet (12/20/2022)    QTY 45 WITH 1 REFILL    PROVIDER PLEASE ADVISE

## 2023-01-16 LAB
ALBUMIN SERPL-MCNC: 4.1 G/DL (ref 3.5–5.2)
ALBUMIN/GLOB SERPL: 1.3 G/DL
ALP SERPL-CCNC: 65 U/L (ref 39–117)
ALT SERPL W P-5'-P-CCNC: 18 U/L (ref 1–33)
ANION GAP SERPL CALCULATED.3IONS-SCNC: 7.8 MMOL/L (ref 5–15)
AST SERPL-CCNC: 16 U/L (ref 1–32)
BASOPHILS # BLD AUTO: 0.04 10*3/MM3 (ref 0–0.2)
BASOPHILS NFR BLD AUTO: 0.7 % (ref 0–1.5)
BILIRUB SERPL-MCNC: 0.2 MG/DL (ref 0–1.2)
BUN SERPL-MCNC: 10 MG/DL (ref 6–20)
BUN/CREAT SERPL: 11.2 (ref 7–25)
CALCIUM SPEC-SCNC: 9.1 MG/DL (ref 8.6–10.5)
CHLORIDE SERPL-SCNC: 105 MMOL/L (ref 98–107)
CO2 SERPL-SCNC: 27.2 MMOL/L (ref 22–29)
CREAT SERPL-MCNC: 0.89 MG/DL (ref 0.57–1)
DEPRECATED RDW RBC AUTO: 43.3 FL (ref 37–54)
EGFRCR SERPLBLD CKD-EPI 2021: 82.6 ML/MIN/1.73
EOSINOPHIL # BLD AUTO: 0.11 10*3/MM3 (ref 0–0.4)
EOSINOPHIL NFR BLD AUTO: 2 % (ref 0.3–6.2)
ERYTHROCYTE [DISTWIDTH] IN BLOOD BY AUTOMATED COUNT: 13.1 % (ref 12.3–15.4)
GLOBULIN UR ELPH-MCNC: 3.2 GM/DL
GLUCOSE SERPL-MCNC: 103 MG/DL (ref 65–99)
HCT VFR BLD AUTO: 37.6 % (ref 34–46.6)
HGB BLD-MCNC: 12.7 G/DL (ref 12–15.9)
IMM GRANULOCYTES # BLD AUTO: 0 10*3/MM3 (ref 0–0.05)
IMM GRANULOCYTES NFR BLD AUTO: 0 % (ref 0–0.5)
LYMPHOCYTES # BLD AUTO: 2.98 10*3/MM3 (ref 0.7–3.1)
LYMPHOCYTES NFR BLD AUTO: 55.3 % (ref 19.6–45.3)
MAGNESIUM SERPL-MCNC: 2.1 MG/DL (ref 1.6–2.6)
MCH RBC QN AUTO: 30.5 PG (ref 26.6–33)
MCHC RBC AUTO-ENTMCNC: 33.8 G/DL (ref 31.5–35.7)
MCV RBC AUTO: 90.4 FL (ref 79–97)
MICROCYTES BLD QL: NORMAL
MONOCYTES # BLD AUTO: 0.52 10*3/MM3 (ref 0.1–0.9)
MONOCYTES NFR BLD AUTO: 9.6 % (ref 5–12)
NEUTROPHILS NFR BLD AUTO: 1.74 10*3/MM3 (ref 1.7–7)
NEUTROPHILS NFR BLD AUTO: 32.4 % (ref 42.7–76)
NRBC BLD AUTO-RTO: 0 /100 WBC (ref 0–0.2)
PLATELET # BLD AUTO: 222 10*3/MM3 (ref 140–450)
PMV BLD AUTO: 9.5 FL (ref 6–12)
POTASSIUM SERPL-SCNC: 4.4 MMOL/L (ref 3.5–5.2)
PROT SERPL-MCNC: 7.3 G/DL (ref 6–8.5)
RBC # BLD AUTO: 4.16 10*6/MM3 (ref 3.77–5.28)
SMALL PLATELETS BLD QL SMEAR: ADEQUATE
SODIUM SERPL-SCNC: 140 MMOL/L (ref 136–145)
TSH SERPL DL<=0.05 MIU/L-ACNC: 1.87 UIU/ML (ref 0.27–4.2)
WBC MORPH BLD: NORMAL
WBC NRBC COR # BLD: 5.39 10*3/MM3 (ref 3.4–10.8)

## 2023-01-16 PROCEDURE — 36415 COLL VENOUS BLD VENIPUNCTURE: CPT

## 2023-01-16 PROCEDURE — 85025 COMPLETE CBC W/AUTO DIFF WBC: CPT

## 2023-01-16 PROCEDURE — 85007 BL SMEAR W/DIFF WBC COUNT: CPT

## 2023-01-16 PROCEDURE — 84443 ASSAY THYROID STIM HORMONE: CPT

## 2023-01-16 PROCEDURE — 99283 EMERGENCY DEPT VISIT LOW MDM: CPT

## 2023-01-16 PROCEDURE — 83735 ASSAY OF MAGNESIUM: CPT

## 2023-01-16 PROCEDURE — 82077 ASSAY SPEC XCP UR&BREATH IA: CPT | Performed by: NURSE PRACTITIONER

## 2023-01-16 PROCEDURE — 80053 COMPREHEN METABOLIC PANEL: CPT

## 2023-01-16 RX ORDER — MECLIZINE HYDROCHLORIDE 25 MG/1
25 TABLET ORAL ONCE
Status: COMPLETED | OUTPATIENT
Start: 2023-01-16 | End: 2023-01-17

## 2023-01-17 ENCOUNTER — HOSPITAL ENCOUNTER (EMERGENCY)
Facility: HOSPITAL | Age: 43
Discharge: HOME | End: 2023-01-17
Attending: EMERGENCY MEDICINE | Admitting: EMERGENCY MEDICINE
Payer: COMMERCIAL

## 2023-01-17 VITALS
WEIGHT: 169.97 LBS | SYSTOLIC BLOOD PRESSURE: 111 MMHG | TEMPERATURE: 98.3 F | RESPIRATION RATE: 18 BRPM | BODY MASS INDEX: 23.8 KG/M2 | HEART RATE: 78 BPM | OXYGEN SATURATION: 100 % | HEIGHT: 71 IN | DIASTOLIC BLOOD PRESSURE: 78 MMHG

## 2023-01-17 DIAGNOSIS — R42 VERTIGO: Primary | ICD-10-CM

## 2023-01-17 LAB
AMPHET+METHAMPHET UR QL: NEGATIVE
BARBITURATES UR QL SCN: NEGATIVE
BENZODIAZ UR QL SCN: NEGATIVE
BILIRUB UR QL STRIP: NEGATIVE
CANNABINOIDS SERPL QL: NEGATIVE
CLARITY UR: CLEAR
COCAINE UR QL: NEGATIVE
COLOR UR: YELLOW
ETHANOL BLD-MCNC: <10 MG/DL (ref 0–10)
ETHANOL UR QL: <0.01 %
GLUCOSE UR STRIP-MCNC: NEGATIVE MG/DL
HGB UR QL STRIP.AUTO: NEGATIVE
KETONES UR QL STRIP: ABNORMAL
LEUKOCYTE ESTERASE UR QL STRIP.AUTO: NEGATIVE
METHADONE UR QL SCN: NEGATIVE
NITRITE UR QL STRIP: NEGATIVE
OPIATES UR QL: NEGATIVE
OXYCODONE UR QL SCN: NEGATIVE
PH UR STRIP.AUTO: 7 [PH] (ref 5–8)
PROT UR QL STRIP: ABNORMAL
SP GR UR STRIP: 1.03 (ref 1–1.03)
UROBILINOGEN UR QL STRIP: ABNORMAL

## 2023-01-17 PROCEDURE — 80307 DRUG TEST PRSMV CHEM ANLYZR: CPT | Performed by: NURSE PRACTITIONER

## 2023-01-17 PROCEDURE — 81003 URINALYSIS AUTO W/O SCOPE: CPT | Performed by: NURSE PRACTITIONER

## 2023-01-17 RX ORDER — MECLIZINE HYDROCHLORIDE 25 MG/1
50 TABLET ORAL 3 TIMES DAILY PRN
Qty: 20 TABLET | Refills: 0 | Status: SHIPPED | OUTPATIENT
Start: 2023-01-17

## 2023-01-17 RX ADMIN — MECLIZINE HYDROCHLORIDE 25 MG: 25 TABLET ORAL at 00:44

## 2023-01-17 NOTE — DISCHARGE INSTRUCTIONS
All of your testing here today was unremarkable.  It sounds like your vertigo reoccurred after you increased your dose of Lamictal.    See if you go back to your old dose do the symptoms resolved.    Take medication as prescribed for dizziness.    Drink plenty fluids.    Call your doctor today
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
## 2023-01-30 ENCOUNTER — HOSPITAL ENCOUNTER (OUTPATIENT)
Dept: ULTRASOUND IMAGING | Facility: HOSPITAL | Age: 43
Discharge: HOME | End: 2023-01-30
Admitting: PHYSICIAN ASSISTANT
Payer: COMMERCIAL

## 2023-01-30 DIAGNOSIS — R22.1 NECK SWELLING: ICD-10-CM

## 2023-01-30 PROCEDURE — 76536 US EXAM OF HEAD AND NECK: CPT

## 2023-02-07 ENCOUNTER — OFFICE VISIT (OUTPATIENT)
Dept: BEHAVIORAL HEALTH | Facility: CLINIC | Age: 43
End: 2023-02-07
Payer: COMMERCIAL

## 2023-02-07 VITALS
BODY MASS INDEX: 23.18 KG/M2 | SYSTOLIC BLOOD PRESSURE: 114 MMHG | HEART RATE: 88 BPM | WEIGHT: 165.6 LBS | DIASTOLIC BLOOD PRESSURE: 78 MMHG | HEIGHT: 71 IN

## 2023-02-07 DIAGNOSIS — F31.32 BIPOLAR DISORDER WITH MODERATE DEPRESSION: ICD-10-CM

## 2023-02-07 DIAGNOSIS — G47.00 INSOMNIA, UNSPECIFIED TYPE: ICD-10-CM

## 2023-02-07 DIAGNOSIS — F41.1 GENERALIZED ANXIETY DISORDER: ICD-10-CM

## 2023-02-07 DIAGNOSIS — F43.10 POST TRAUMATIC STRESS DISORDER (PTSD): ICD-10-CM

## 2023-02-07 DIAGNOSIS — F60.3 BORDERLINE PERSONALITY DISORDER: Primary | ICD-10-CM

## 2023-02-07 PROCEDURE — 99214 OFFICE O/P EST MOD 30 MIN: CPT

## 2023-02-07 PROCEDURE — 90833 PSYTX W PT W E/M 30 MIN: CPT

## 2023-02-07 RX ORDER — LAMOTRIGINE 100 MG/1
100 TABLET ORAL DAILY
Qty: 30 TABLET | Refills: 2 | Status: SHIPPED | OUTPATIENT
Start: 2023-02-07 | End: 2024-02-07

## 2023-02-07 RX ORDER — QUETIAPINE FUMARATE 50 MG/1
50 TABLET, FILM COATED ORAL NIGHTLY
Qty: 30 TABLET | Refills: 2 | Status: SHIPPED | OUTPATIENT
Start: 2023-02-07 | End: 2023-03-07 | Stop reason: ALTCHOICE

## 2023-02-07 NOTE — PATIENT INSTRUCTIONS
1.  Please return to clinic at your next scheduled visit.  Please contact the clinic ([phone]) at least 24 hours prior in the event you need to cancel.  2.  Do no harm to yourself or others.    3.  Avoid alcohol and drugs.    4.  Take all medications as prescribed.  Please contact the clinic with any concerns. If you are in need of medication refills, please call the clinic at [phone].    5. Should you want to get in touch with your provider, ERIK Pacheco, please contact the office ([phone]), and staff will be able to page Janine directly.  6. In the event you have personal crisis, contact the following crisis numbers: Suicide Prevention Hotline 1-213.382.1612; [facility] Helpline 4-002-363-BITA; [facility] Emergency Room [phone]; text HELLO to 471538; or 895.     SPECIFIC RECOMMENDATIONS:     1.      Medications discussed at this encounter: Increase Lamictal                    2.      Psychotherapy recommendations: We will continue therapy at future visits.     3.     Return to clinic: 1 month

## 2023-02-07 NOTE — PROGRESS NOTES
"[facility]  Medication Management Follow-up    Referring Provider:  Elizabeth Doyle PA-C  [address] [facility]  [place] [address]    Vital Signs:   /78   Pulse 88   Ht 180.3 cm (71\")   Wt 75.1 kg (165 lb 9.6 oz)   BMI 23.10 kg/m²    Visit Diagnoses:    ICD-10-CM ICD-9-CM   1. Borderline personality disorder (HCC)  F60.3 301.83   2. Bipolar disorder with moderate depression (HCC)  F31.32 296.52   3. Generalized anxiety disorder  F41.1 300.02   4. Post traumatic stress disorder (PTSD)  F43.10 309.81   5. Insomnia, unspecified type  G47.00 780.52       Chief Complaint: Depression.  Anxiety.  PTSD.  BPD.  Insomnia.    History of Present Illness:   Cruz Nichols is a 42 y.o. female who presents today for follow up for depression, anxiety, PTSD, BPD, insomnia.  Patient came in and immediately started crying.  She is having a difficult time with interpersonal relationships.  She feels like there are a lot of people who are \"holding the past against me.\"  She is working on setting boundaries with an old classmate who has recently been back in her life.  They do not have relationship however she seems to be bothered by the things that he says and the way he addresses her saying that he \"his gas lighting.\"  She describes her life as just working really hard and trying to take care of her house and that she is not living the same lifestyle she wants was.  She is feeling like many people are judging her when they do not know exactly what her situation is themselves.  This is causing her a lot of bipolar depression and anxiety.  She denies nightmares.  Denies suicidal ideation.  Denies AVH.  Patient did increase Lamictal from 25 mg to 50 mg and is tolerating it well.  She was on previous dose of 100 mg and that seemed to help stabilize her mood the most.  Patient has been sleeping well and says that she takes the Seroquel for sleep sometimes.  PHQ-9 is 22 and AUGUST-7 is 15 and both " "are congruent with assessment and presentation.    Record Review is below for 02/07/2023 : I have thoroughly reviewed the patient's electronic medical record to include previous encounters, care everywhere, notes, medications, labs, JENI and UDS (if applicable), imaging, and EKG's.  Pertinent information is included in this note.  1/17/2023 UDS is negative for all substances. hemoglobin A1c, TSH, CBC, and CMP reviewed were all reassuring and within normal limits.  Lamotrigine level on 10/17/2022 was 4.2, and within the expected range. 12/23/2020 EKG [id number].   12/20/2023 Cruz Nichols is a 42 y.o. female who presents today for follow up concerning anxiety, PTSD, insomnia, MDD, BPD. Patient states she ran out of Lamictal on Friday and did not call the office, has not been taking only for few days, however all these medicines were working well for her.  She has started to notice some mood changes and irritability since not taking the Lamictal.  She was encouraged to, in the future, call the office and make sure that we give her refills on these medicines as they should not be discontinued abruptly.  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  She has also noticed some irritability with coworkers however she has been implementing some positive coping skills and not allowing negative thoughts to cause interpersonal issues at work.  Denies SI or HI.  Denies AVH.  Patient states she has been sleeping well on the quetiapine.  Presentation seems to be most consistent with borderline personality disorder, MDD, AUGUST, PTSD, and insomnia..  Will continue quetiapine, Wellbutrin XL, Lamictal for management of depression, anxiety, insomnia, and overall mood.  However we will retitrate Lamictal from a low-dose of 25 mg for safety.  I extensively discussed the importance of her contacting the office should she need refills on " her medications as it would be unsafe to discontinue them abruptly. Patient verbalized understanding and is agreeable to this plan.  Follow-up in 6 weeks.  Addressed all questions and concerns.  Continue psychotherapeutic modalities as indicated.  Record Review is below for 12/20/2022 : No encounters noted since last visit on 11/15/2022, hemoglobin A1c, TSH, CBC, and CMP reviewed were all reassuring and within normal limits.  Lamotrigine level on 10/17/2022 was 4.2, and within the expected range. 12/23/2020 EKG [id number].   Per Jess Carballo PA-C on 11/15/2022- (Presentation seems to be most consistent with borderline personality disorder, MDD, AUGUST, PTSD, and insomnia.  We will continue Seroquel at current dose to target depression, anxiety, insomnia, PTSD, and overall mood.  We will continue Lamictal at current dose to target borderline personality disorder, depression, anxiety, PTSD, and overall mood.  We will continue Wellbutrin at current dose to target depression, anxiety, and overall mood.  Recommended for patient to journal.  Continue therapy.  Follow up in 1 month.  Addressed all questions and concerns. Previous Medical Record Review: Reviewed office visit note from 10/11/21, pt f/u for mood. She recently lost custody of her youngest daughter 2/2021. She has good support from her 22 year old daughter. Pt has been seeing a therapist at [facility]. She had a break down at work and [employer] put her on paid leave. Pt denies SI and HI. She has been seeing Marita VALENCIA at [facility]. She has not been taking prescribed Lithium and Wellbutrin. She has been taking OTC stress meds. Pt reports previously being diagnosed with bipolar but does not think she has that. Pt referred to psych for PTSD, borderline personality.      Reviewed office visit note from 10/10/19, pt reports that treatment is going well since she has restarted Lithium.     Reviewed office visit note from 2/26/19, pt reports good  control of anxiety with Wellbutrin. Pt getting Abilify injections, which was required by court.    I have thoroughly reviewed the patient's electronic medical record to include previous encounters, notes, medications, labs, imaging, and EKG's.  Pertinent information is included in this note.  Psychiatric/Behavioral Health History  Began Treatment: Patient started treatment when she was 15 years old, [facility]   Diagnoses: Patient reports being diagnosed with bipolar disorder when she was 15 years old, though notes that she does not think she actually has this.  History of MDD, AUGUST, panic disorder.  She is currently seeing a therapist who thinks she has borderline personality disorder.  Patient also reports being diagnosed with schizophrenia in the past when she was hospitalized, but does not think she has this either.  Psychiatrist: Patient has seen several psychiatrists in the past, Dr. Gibbons, Evie Farnsworth, and [facility]. She most recently was seen by Marita VALENCIA.   Therapist: Pt has seen therapist in the past. [facility].   Admission History: Patient reports being admitted to [facility] twice when she was a teenager and also once at [facility] in 2000.  Patient reports all admissions were due to suicide attempt of overdosing on pills.  Medications/Treatment: Patient reports being on many different medications and is unable to recall them.  She was previously on lithium (not helping symptoms), Prozac (nausea), Paxil (headache), Geodon, trazodone, Celexa, Abilify, Seroquel, and risperidone.  Self Harm: History of self-harm with punching a mirror and using broken glass to cut wrists.  Last self-harm was in 2012.  Suicide Attempts: History of suicide attempt with overdosing on pills x3.    Postpartum depression: Pt reports being diagnosed with postpartum depression with both of her daughters.   PHQ-9 Depression  Screening  PHQ-9 Total Score: 22    Little interest or pleasure in doing things? 3-->nearly every day   Feeling down, depressed, or hopeless? 3-->nearly every day   Trouble falling or staying asleep, or sleeping too much? 3-->nearly every day   Feeling tired or having little energy? 2-->more than half the days   Poor appetite or overeating? 2-->more than half the days   Feeling bad about yourself - or that you are a failure or have let yourself or your family down? 3-->nearly every day   Trouble concentrating on things, such as reading the newspaper or watching television? 3-->nearly every day   Moving or speaking so slowly that other people could have noticed? Or the opposite - being so fidgety or restless that you have been moving around a lot more than usual? 3-->nearly every day   Thoughts that you would be better off dead, or of hurting yourself in some way? 0-->not at all   PHQ-9 Total Score 22     AUGUST-7  Feeling nervous, anxious or on edge: More than half the days  Not being able to stop or control worrying: Nearly every day  Worrying too much about different things: Nearly every day  Trouble Relaxing: Nearly every day  Being so restless that it is hard to sit still: Several days  Feeling afraid as if something awful might happen: Nearly every day  Becoming easily annoyed or irritable:  (NO AWNSER)  AUGUST 7 Total Score: 15  If you checked any problems, how difficult have these problems made it for you to do your work, take care of things at home, or get along with other people: Very difficult  Labs:  WBC   Date Value Ref Range Status   01/16/2023 5.39 3.40 - 10.80 10*3/mm3 Final     Platelets   Date Value Ref Range Status   01/16/2023 222 140 - 450 10*3/mm3 Final     Hemoglobin   Date Value Ref Range Status   01/16/2023 12.7 12.0 - 15.9 g/dL Final     Hematocrit   Date Value Ref Range Status   01/16/2023 37.6 34.0 - 46.6 % Final     Glucose   Date Value Ref Range Status   01/16/2023 103 (H) 65 - 99 mg/dL Final      Creatinine   Date Value Ref Range Status   01/16/2023 0.89 0.57 - 1.00 mg/dL Final     ALT (SGPT)   Date Value Ref Range Status   01/16/2023 18 1 - 33 U/L Final     AST (SGOT)   Date Value Ref Range Status   01/16/2023 16 1 - 32 U/L Final     BUN   Date Value Ref Range Status   01/16/2023 10 6 - 20 mg/dL Final     eGFR   Date Value Ref Range Status   01/16/2023 82.6 >60.0 mL/min/1.73 Final     Comment:     National Kidney Foundation and American Society of Nephrology (ASN) Task Force recommended calculation based on the Chronic Kidney Disease Epidemiology Collaboration (CKD-EPI) equation refit without adjustment for race.     Total Cholesterol   Date Value Ref Range Status   03/23/2022 163 0 - 200 mg/dL Final     Triglycerides   Date Value Ref Range Status   03/23/2022 108 0 - 150 mg/dL Final     HDL Cholesterol   Date Value Ref Range Status   03/23/2022 47 40 - 60 mg/dL Final     LDL Cholesterol    Date Value Ref Range Status   03/23/2022 96 0 - 100 mg/dL Final     VLDL Cholesterol   Date Value Ref Range Status   03/23/2022 20 5 - 40 mg/dL Final     LDL/HDL Ratio   Date Value Ref Range Status   03/23/2022 2.01  Final     Hemoglobin A1C   Date Value Ref Range Status   10/17/2022 5.50 4.80 - 5.60 % Final     TSH   Date Value Ref Range Status   01/16/2023 1.870 0.270 - 4.200 uIU/mL Final     Free T4   Date Value Ref Range Status   10/05/2020 1.2 0.9 - 1.8 ng/dL Final      Pain Management Panel     Pain Management Panel Latest Ref Rng & Units 1/17/2023 10/5/2020    AMPHETAMINES SCREEN, URINE NA - NEGATIVE    BARBITURATES SCREEN Negative Negative NEGATIVE    BENZODIAZEPINE SCREEN, URINE Negative Negative NEGATIVE    COCAINE SCREEN, URINE Negative Negative NEGATIVE    METHADONE SCREEN, URINE Negative Negative NEGATIVE           Imaging Results:  US Head Neck Soft Tissue    Result Date: 1/30/2023    1. Unremarkable thyroid.  No enlarged adenopathy is seen by ultrasound.  If persisting symptoms consider follow-up CT  for more sensitive evaluation.     EMILY BENNETT MD       Electronically Signed and Approved By: EMILY BENNETT MD on 1/30/2023 at 14:09             EKG Results:  SCANNED EKG (12/23/2020)    Current Medications:   Current Outpatient Medications   Medication Sig Dispense Refill   • Acetaminophen (TYLENOL 8 HOUR PO) Take  by mouth. AS NEEDED     • buPROPion XL (Wellbutrin XL) 150 MG 24 hr tablet Take 1 tab PO QAM. Take with 300mg tab for total dose of 450mg 30 tablet 2   • buPROPion XL (WELLBUTRIN XL) 300 MG 24 hr tablet Take 1 tab PO QAM. Take with 150mg tab for total dose of 450mg 30 tablet 2   • meclizine (ANTIVERT) 25 MG tablet Take 2 tablets by mouth 3 (Three) Times a Day As Needed for Dizziness. 20 tablet 0   • Methylcobalamin (B12-ACTIVE PO) Take  by mouth. OTC GUMMIES + TABLETS     • oxybutynin XL (DITROPAN XL) 15 MG 24 hr tablet Take 15 mg by mouth Daily.     • QUEtiapine (SEROquel) 50 MG tablet Take 1 tablet by mouth Every Night for 30 days. 30 tablet 2   • lamoTRIgine (LaMICtal) 100 MG tablet Take 1 tablet by mouth Daily. 30 tablet 2     No current facility-administered medications for this visit.       Problem List:  Patient Active Problem List   Diagnosis   • Major depressive disorder, recurrent episode, moderate degree (HCC)   • PTSD (post-traumatic stress disorder)   • Borderline personality disorder in adult ([facility])   • Allergic rhinitis   • Bipolar disorder ([facility])   • Migraine without aura   • Urge incontinence   • Urinary incontinence   • Bladder spasm   • Body mass index (BMI) of 25.0-25.9 in adult   • Routine health maintenance   • Balance disorder   • Lightheaded       Allergy:   Allergies   Allergen Reactions   • Codeine Seizure     Other reaction(s): Seizure   • Prednisone Anaphylaxis        Discontinued Medications:  Medications Discontinued During This Encounter   Medication Reason   • LaMICtal 25 MG tablet Dose adjustment   • QUEtiapine (SEROquel) 50 MG tablet Reorder       Mental  Status Exam:   Appearance: good eye contact, normal street clothes, groomed, sitting in chair   Behavior: pleasant and cooperative, patient became very tearful throughout interview  Motor: no abnormal  Speech: Increased rhythm, rate, volume, tone, hyperverbal, pressured  Mood: Euthymic, anxious  Affect: Opiate  Thought Content: negative suicidal ideations, negative homicidal ideations, negative obsessions  Perceptions: negative auditory hallucinations, negative visual hallucinations, negative delusions, negative paranoia  Thought Process: goal directed, linear  Insight/Judgement: fair/fair  Cognition: grossly intact  Attention: intact  Orientation: person, place, time and situation  Memory: intact    Review of Systems:   Constitutional: Denies fatigue, night sweats  Eyes: Denies double vision, blurred vision  HENT: Denies vertigo, recent head injury  Cardiovascular: Denies chest pain, irregular heartbeats  Respiratory: Denies productive cough, shortness of breath  Gastrointestinal: Denies nausea, vomiting  Genitourinary: Denies dysuria, urinary retention  Integument: Denies hair growth change, new skin lesions  Neurologic: Denies altered mental status, seizures  Musculoskeletal: Denies joint swelling, limitation of motion  Endocrine: Denies cold intolerance, heat intolerance  Psychiatric: See mental status exam  Allergic-immunologic: Denies frequent illnesses    Psychotherapy:     29 minutes of supportive psychotherapy with goal to strengthen defenses, promote problems solving, restore adaptive functioning and provide symptom relief. The therapeutic alliance was strengthened to encourage the patient to express their thoughts and feelings. Esteem building was enhanced through praise, reassurance, normalizing and encouragement. Coping skills were enhanced to build distress tolerance skills and emotional regulation. Allowed patient to freely discuss issues without interruption or judgement with unconditional positive  regard, active listening skills, and empathy. Provided a safe, confidential environment to facilitate the development of a positive therapeutic relationship and encourage open, honest communication. Assisted patient in identifying risk factors which would indicate the need for higher level of care including thoughts to harm self or others and/or self-harming behavior and encouraged patient to contact this office, call 911, or present to the nearest emergency room should any of these events occur. Assisted patient in processing session content; acknowledged and normalized patient’s thoughts, feelings, and concerns by utilizing a person-centered approach in efforts to build appropriate rapport and a positive therapeutic relationship with open and honest communication. Patient given education on medication side effects, diagnosis/illness and relapse symptoms. Plan to continue supportive psychotherapy in next appointment to provide symptom relief.        Diagnoses and all orders for this visit:    1. Borderline personality disorder (HCC) (Primary)  -     lamoTRIgine (LaMICtal) 100 MG tablet; Take 1 tablet by mouth Daily.  Dispense: 30 tablet; Refill: 2  -     QUEtiapine (SEROquel) 50 MG tablet; Take 1 tablet by mouth Every Night for 30 days.  Dispense: 30 tablet; Refill: 2    2. Bipolar disorder with moderate depression (HCC)    3. Generalized anxiety disorder  -     lamoTRIgine (LaMICtal) 100 MG tablet; Take 1 tablet by mouth Daily.  Dispense: 30 tablet; Refill: 2  -     QUEtiapine (SEROquel) 50 MG tablet; Take 1 tablet by mouth Every Night for 30 days.  Dispense: 30 tablet; Refill: 2    4. Post traumatic stress disorder (PTSD)  -     lamoTRIgine (LaMICtal) 100 MG tablet; Take 1 tablet by mouth Daily.  Dispense: 30 tablet; Refill: 2  -     QUEtiapine (SEROquel) 50 MG tablet; Take 1 tablet by mouth Every Night for 30 days.  Dispense: 30 tablet; Refill: 2    5. Insomnia, unspecified type  -     QUEtiapine (SEROquel) 50  MG tablet; Take 1 tablet by mouth Every Night for 30 days.  Dispense: 30 tablet; Refill: 2         PLAN:   Continue Seroquel  Continue Wellbutrin XL  Increase Lamictal to 100 mg  Presentation seems most consistent with bipolar with moderate depression, AUGUST, PTSD, BPD, insomnia.  Will continue Seroquel and Wellbutrin XL for management of depression, anxiety, and overall mood.   Will increase Lamictal to 100 mg for management of mood. Patient verbalized understanding and is agreeable to this plan.  Addressed all questions and concerns.  Continue psychotherapeutic modalities as indicated.    TREATMENT PLAN/GOALS:  Treatment plan: Continue supportive psychotherapy efforts and medications as indicated. Continue to challenge patterns of living conducive to pathology, strengthen defenses, promote problems solving, restore adaptive functioning and provide symptom relief. Treatment and medication options discussed during today's visit. Patient acknowledged and verbally consented to continue with current treatment plan and was educated on the importance of compliance with treatment and follow-up appointments.  Functional status:Good  Prognosis: Good  Progress: Continued improvement    1. Safety: No acute safety concerns.   2. Therapy: Declines  3. Risk Assessment: Risk of self-harm acutely is moderate.  Risk factors include anxiety disorder, mood disorder, and recent psychosocial stressors (pandemic). Protective factors include no family history, denies access to guns/weapons, no present SI, minimal AODA, healthcare seeking, future orientation, willingness to engage in care.  Risk of self-harm chronically is also moderate, but could be further elevated in the event of treatment noncompliance and/or AODA.  4. Labs/studies: No labs/studies ordered at this time  Medications:   New Medications Ordered This Visit   Medications   • lamoTRIgine (LaMICtal) 100 MG tablet     Sig: Take 1 tablet by mouth Daily.     Dispense:  30 tablet      Refill:  2   • QUEtiapine (SEROquel) 50 MG tablet     Sig: Take 1 tablet by mouth Every Night for 30 days.     Dispense:  30 tablet     Refill:  2   5.    Medication Education:   SEROQUEL (QUETIAPINE) Risks, benefits, alternatives discussed with patient including nausea and vomiting, GI upset, sedation, dizziness, falls, akathisia, hypotension, increased appetite, lowering of seizure threshold, theoretical risk of tardive dyskinesia, extrapyramidal symptoms, restless legs syndrome. After discussion of these risks and benefits, the patient voiced understanding and agreed to proceed.  LAMICTAL (LAMOTRIGINE) Risks, benefits, alternatives discussed with patient including rash, rebound depressive or manic symptoms if prompt discontinuation, GI upset, agitation, sedation/falls risk.  After discussion of these risks and benefits, patient voiced understanding and agreed to proceed.  WELLBUTRIN XL (BUPROPION) Risks, benefits, alternatives discussed with patient including nausea, GI upset, increased energy, exacerbation of irritability, insomnia, lowering of seizure threshold.  After discussion of these risks and benefits, the patient voiced understanding and agreed to proceed.    Medication Issues:  JENI reviewed as expected.  Discussed medication options and treatment plan of prescribed medication as well as the risks, benefits, and side effects including potential falls, possible impaired driving and metabolic adversities among others. Patient is agreeable to call the office with any worsening of symptoms or onset of side effects. Patient is agreeable to call 911 or go to the nearest ER should he/she begin having SI/HI. No medication side effects or related complaints today.   6. Follow-up: Return in about 1 month (around 3/7/2023) for Next scheduled follow up, Recheck.         This document has been electronically signed by ERIK Pacheco  February 7, 2023 16:30 EST    Please note that portions of this note  were completed with a voice recognition program.  Copied text in this note has been reviewed and is accurate as of 02/07/23

## 2023-02-14 DIAGNOSIS — F41.1 GENERALIZED ANXIETY DISORDER: ICD-10-CM

## 2023-02-14 DIAGNOSIS — F33.2 SEVERE EPISODE OF RECURRENT MAJOR DEPRESSIVE DISORDER, WITHOUT PSYCHOTIC FEATURES: ICD-10-CM

## 2023-02-14 DIAGNOSIS — F60.3 BORDERLINE PERSONALITY DISORDER: ICD-10-CM

## 2023-02-14 DIAGNOSIS — F43.10 POST TRAUMATIC STRESS DISORDER (PTSD): ICD-10-CM

## 2023-02-14 RX ORDER — LAMOTRIGINE 25 MG/1
TABLET ORAL
Qty: 45 TABLET | Refills: 1 | OUTPATIENT
Start: 2023-02-14

## 2023-02-14 NOTE — TELEPHONE ENCOUNTER
The original prescription was discontinued on 2/7/2023 by Janine Hummel APRN for the following reason: Dose adjustment. Renewing this prescription may not be appropriate.    NEXT APPT WITH PROVIDER  Appointment with Janine Hummel APRN (03/07/2023)    PROVIDER PLEASE ADVISE

## 2023-02-21 ENCOUNTER — OFFICE VISIT (OUTPATIENT)
Dept: UROLOGY | Facility: CLINIC | Age: 43
End: 2023-02-21
Payer: COMMERCIAL

## 2023-02-21 VITALS — RESPIRATION RATE: 16 BRPM | WEIGHT: 163.6 LBS | BODY MASS INDEX: 22.9 KG/M2 | HEIGHT: 71 IN

## 2023-02-21 DIAGNOSIS — N39.41 URGE INCONTINENCE: Primary | ICD-10-CM

## 2023-02-21 LAB
BILIRUB BLD-MCNC: NEGATIVE MG/DL
CLARITY, POC: CLEAR
COLOR UR: YELLOW
EXPIRATION DATE: NORMAL
GLUCOSE UR STRIP-MCNC: NEGATIVE MG/DL
KETONES UR QL: NEGATIVE
LEUKOCYTE EST, POC: NEGATIVE
Lab: NORMAL
NITRITE UR-MCNC: NEGATIVE MG/ML
PH UR: 6 [PH] (ref 5–8)
PROT UR STRIP-MCNC: NEGATIVE MG/DL
RBC # UR STRIP: NEGATIVE /UL
SP GR UR: 1.03 (ref 1–1.03)
URINE VOLUME: 0
UROBILINOGEN UR QL: NORMAL

## 2023-02-21 PROCEDURE — 99213 OFFICE O/P EST LOW 20 MIN: CPT | Performed by: NURSE PRACTITIONER

## 2023-02-21 PROCEDURE — 51798 US URINE CAPACITY MEASURE: CPT | Performed by: NURSE PRACTITIONER

## 2023-02-21 RX ORDER — SOLIFENACIN SUCCINATE 5 MG/1
TABLET, FILM COATED ORAL
COMMUNITY
Start: 2023-02-16 | End: 2023-02-21 | Stop reason: SDUPTHER

## 2023-02-21 RX ORDER — SOLIFENACIN SUCCINATE 5 MG/1
5 TABLET, FILM COATED ORAL DAILY
Qty: 90 TABLET | Refills: 4 | Status: SHIPPED | OUTPATIENT
Start: 2023-02-21
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
## 2023-02-23 ENCOUNTER — TELEPHONE (OUTPATIENT)
Dept: PSYCHIATRY | Facility: CLINIC | Age: 43
End: 2023-02-23
Payer: COMMERCIAL

## 2023-02-23 NOTE — TELEPHONE ENCOUNTER
"Pt called concerned about her weight and would like to possibly change medications. Pt reported she has appt with provider next month, however she's become very concerned about her weight loss.    Pt became upset and reported \"she can't do it\" and has been experiencing abnormal weight loss from Wellbutrin XL 450mg and Lamictal 100mg.     buPROPion XL (Wellbutrin XL) 150 MG 24 hr tablet (12/20/2022)    buPROPion XL (WELLBUTRIN XL) 300 MG 24 hr tablet (12/20/2022)    lamoTRIgine (LaMICtal) 100 MG tablet (02/07/2023)    Pt reported she used to weigh 250lb and now she weighs 164lb. Pt reported her friends/family have noticed and are concerned about her well-being due to the major weight loss.    Pt reported she goes back to work tomorrow but will be off Monday, Tuesday and Wednesday. Pt has been drinking Ensure + shakes and has been getting help from family/friends to try to gain some of her weight back.     Pt was reassured this message will be passed along to provider, and once we receive next steps either clinical staff or provider will call her back with next steps. Pt was also made aware that if provider or her MA attempt to call her that the caller ID may not show as our office number, and was encouraged to still answer the phone just in case. Pt expressed understanding. Please review and advise.   "

## 2023-02-28 NOTE — TELEPHONE ENCOUNTER
Phoned patient to discuss her concerns about the side effects of weight loss with her medication.  We are going to taper off of Wellbutrin XL.      Decrease Wellbutrin XL to 300 mg daily  Continue Lamictal  Continue quetiapine  Keep follow-up appointment as scheduled below    Appointment with Janine Hummel APRN (03/07/2023)    Patient acknowledged and verbally consented to continue with this treatment plan and was educated on the importance of compliance with treatment and follow-up appointments.    ERIK Pacheco, PMHNP-[facility]  Office: [phone]

## 2023-03-07 ENCOUNTER — OFFICE VISIT (OUTPATIENT)
Dept: BEHAVIORAL HEALTH | Facility: CLINIC | Age: 43
End: 2023-03-07
Payer: COMMERCIAL

## 2023-03-07 VITALS
SYSTOLIC BLOOD PRESSURE: 124 MMHG | BODY MASS INDEX: 23.3 KG/M2 | WEIGHT: 166.4 LBS | HEART RATE: 95 BPM | HEIGHT: 71 IN | DIASTOLIC BLOOD PRESSURE: 78 MMHG

## 2023-03-07 DIAGNOSIS — F60.3 BORDERLINE PERSONALITY DISORDER: ICD-10-CM

## 2023-03-07 DIAGNOSIS — F41.1 GENERALIZED ANXIETY DISORDER: ICD-10-CM

## 2023-03-07 DIAGNOSIS — F31.32 BIPOLAR DISORDER WITH MODERATE DEPRESSION: Primary | ICD-10-CM

## 2023-03-07 DIAGNOSIS — F43.10 POST TRAUMATIC STRESS DISORDER (PTSD): ICD-10-CM

## 2023-03-07 PROCEDURE — 99214 OFFICE O/P EST MOD 30 MIN: CPT

## 2023-03-07 PROCEDURE — 90833 PSYTX W PT W E/M 30 MIN: CPT

## 2023-03-07 RX ORDER — LAMOTRIGINE 25 MG/1
TABLET ORAL
COMMUNITY
Start: 2023-02-21 | End: 2023-03-07 | Stop reason: ALTCHOICE

## 2023-03-07 RX ORDER — LURASIDONE HYDROCHLORIDE 20 MG/1
20 TABLET, FILM COATED ORAL DAILY
Qty: 30 TABLET | Refills: 1 | Status: SHIPPED | OUTPATIENT
Start: 2023-03-07

## 2023-03-07 NOTE — PATIENT INSTRUCTIONS
1.  Please return to clinic at your next scheduled visit.  Please contact the clinic ([phone]) at least 24 hours prior in the event you need to cancel.  2.  Do no harm to yourself or others.    3.  Avoid alcohol and drugs.    4.  Take all medications as prescribed.  Please contact the clinic with any concerns. If you are in need of medication refills, please call the clinic at [phone].    5. Should you want to get in touch with your provider, ERIK Pacheco, please contact the office ([phone]), and staff will be able to page Janine directly.  6. In the event you have personal crisis, contact the following crisis numbers: Suicide Prevention Hotline 1-990.138.3575; [facility] Helpline 8-712-285-KFEQ; [facility] Emergency Room [phone]; text HELLO to 112672; or 534.     SPECIFIC RECOMMENDATIONS:     1.      Medications discussed at this encounter: LATUDA (LURASIDONE) Take with food. Risks, benefits, and alternatives discussed with patient including akathisia, sedation, dizziness/falls risk, nausea, low risk of weight gain & metabolic risks for diabetes and dyslipidemia, and rare tardive dyskinesia.  After discussion of these risks and benefits, the patient voiced understanding and agreed to proceed. Instructed to take medication with meal of minimum of 350 calories to improve consistent efficacy and absorption.                       2.      Psychotherapy recommendations: We will continue therapy at future visits.     3.     Return to clinic: In 5 weeks

## 2023-03-07 NOTE — PROGRESS NOTES
"[facility]  Medication Management Follow-up    Referring Provider:  No referring provider defined for this encounter.    Vital Signs:   /78   Pulse 95   Ht 180.3 cm (71\")   Wt 75.5 kg (166 lb 6.4 oz)   BMI 23.21 kg/m²    Visit Diagnoses:    ICD-10-CM ICD-9-CM   1. Bipolar disorder with moderate depression (HCC)  F31.32 296.52   2. Borderline personality disorder (HCC)  F60.3 301.83   3. Generalized anxiety disorder  F41.1 300.02   4. Post traumatic stress disorder (PTSD)  F43.10 309.81       Chief Complaint: Bipolar.  BPD.  PTSD.  Anxiety.    History of Present Illness:   03/07/2023Cangelo Nichols is a 42 y.o. female who presents today for follow up for bipolar, anxiety, PTSD, and BPD.  Patient states she got to talk her little girl. 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  She feels like things are going fairly well.  She has set some very firm boundaries with her ex-boyfriend and things have improved with this interpersonal situation.  However, patient is still having some troubles with mood instability.  She says that she does not want to be put on lithium because she experienced Lithium toxicity when she was in treatment prior.  She was having trouble with insomnia previously but she says that the Seroquel makes her way too sedated the following day.  Patient has also been complaining about significant weight loss which we have believed to find attribution to the Wellbutrin XL.  We have started to wean her off of this medication with the goal of finding an alternative option.  Denies nightmares.  Denies suicidal ideation.  Denies AVH.    Record Review is below for 03/07/2023 : I have thoroughly reviewed the patient's electronic medical record to include previous encounters, care everywhere, notes, medications, labs, JENI and UDS (if applicable), imaging, and EKG's.  Pertinent information is included in this " "note.  1/17/2023 TSH is 1.870, magnesium is 2.1, CMP and CBC are reassuring.  UDS is satisfactory  EKG Results:  SCANNED EKG (12/23/2020)  QTc 425  Cruz Nichols is a 42 y.o. female who presents today for follow up for depression, anxiety, PTSD, BPD, insomnia.  Patient came in and immediately started crying.  She is having a difficult time with interpersonal relationships.  She feels like there are a lot of people who are \"holding the past against me.\"  She is working on setting boundaries with an old classmate who has recently been back in her life.  They do not have relationship however she seems to be bothered by the things that he says and the way he addresses her saying that he \"his gas lighting.\"  She describes her life as just working really hard and trying to take care of her house and that she is not living the same lifestyle she wants was.  She is feeling like many people are judging her when they do not know exactly what her situation is themselves.  This is causing her a lot of bipolar depression and anxiety.  She denies nightmares.  Denies suicidal ideation.  Denies AVH.  Patient did increase Lamictal from 25 mg to 50 mg and is tolerating it well.  She was on previous dose of 100 mg and that seemed to help stabilize her mood the most.  Patient has been sleeping well and says that she takes the Seroquel for sleep sometimes.  PHQ-9 is 22 and AUGUST-7 is 15 and both are congruent with assessment and presentation.    Record Review is below for 02/07/2023 : I have thoroughly reviewed the patient's electronic medical record to include previous encounters, care everywhere, notes, medications, labs, JENI and UDS (if applicable), imaging, and EKG's.  Pertinent information is included in this note.  1/17/2023 UDS is negative for all substances. hemoglobin A1c, TSH, CBC, and CMP reviewed were all reassuring and within normal limits.  Lamotrigine level on 10/17/2022 was 4.2, and within the expected range. " "12/23/2020 EKG [id number].   12/20/2023 Cruz Nichols is a 42 y.o. female who presents today for follow up concerning anxiety, PTSD, insomnia, MDD, BPD. Patient states she ran out of Lamictal on Friday and did not call the office, has not been taking only for few days, however all these medicines were working well for her.  She has started to notice some mood changes and irritability since not taking the Lamictal.  She was encouraged to, in the future, call the office and make sure that we give her refills on these medicines as they should not be discontinued abruptly.  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  She has also noticed some irritability with coworkers however she has been implementing some positive coping skills and not allowing negative thoughts to cause interpersonal issues at work.  Denies SI or HI.  Denies AVH.  Patient states she has been sleeping well on the quetiapine.  Presentation seems to be most consistent with borderline personality disorder, MDD, AUGUST, PTSD, and insomnia..  Will continue quetiapine, Wellbutrin XL, Lamictal for management of depression, anxiety, insomnia, and overall mood.  However we will retitrate Lamictal from a low-dose of 25 mg for safety.  I extensively discussed the importance of her contacting the office should she need refills on her medications as it would be unsafe to discontinue them abruptly. Patient verbalized understanding and is agreeable to this plan.  Follow-up in 6 weeks.  Addressed all questions and concerns.  Continue psychotherapeutic modalities as indicated.  Record Review is below for 12/20/2022 : No encounters noted since last visit on 11/15/2022, hemoglobin A1c, TSH, CBC, and CMP reviewed were all reassuring and within normal limits.  Lamotrigine level on 10/17/2022 was 4.2, and within the expected range. 12/23/2020 EKG [id number].   Per Jess " BRYON Carballo on 11/15/2022- (Presentation seems to be most consistent with borderline personality disorder, MDD, AUGUST, PTSD, and insomnia.  We will continue Seroquel at current dose to target depression, anxiety, insomnia, PTSD, and overall mood.  We will continue Lamictal at current dose to target borderline personality disorder, depression, anxiety, PTSD, and overall mood.  We will continue Wellbutrin at current dose to target depression, anxiety, and overall mood.  Recommended for patient to journal.  Continue therapy.  Follow up in 1 month.  Addressed all questions and concerns.   Previous Medical Record Review: Reviewed office visit note from 10/11/21, pt f/u for mood. She recently lost custody of her youngest daughter 2/2021. She has good support from her 22 year old daughter. Pt has been seeing a therapist at [facility]. She had a break down at work and [employer] put her on paid leave. Pt denies SI and HI. She has been seeing Marita VALENCIA at [facility]. She has not been taking prescribed Lithium and Wellbutrin. She has been taking OTC stress meds. Pt reports previously being diagnosed with bipolar but does not think she has that. Pt referred to psych for PTSD, borderline personality.      Reviewed office visit note from 10/10/19, pt reports that treatment is going well since she has restarted Lithium.     Reviewed office visit note from 2/26/19, pt reports good control of anxiety with Wellbutrin. Pt getting Abilify injections, which was required by court.    I have thoroughly reviewed the patient's electronic medical record to include previous encounters, notes, medications, labs, imaging, and EKG's.  Pertinent information is included in this note.    Psychiatric/Behavioral Health History  Began Treatment: Patient started treatment when she was 15 years old, which she was seen at [facility].   Diagnoses: Patient reports being diagnosed with bipolar disorder when she was 15 years old,  though notes that she does not think she actually has this.  History of MDD, AUGUST, panic disorder.  She is currently seeing a therapist who thinks she has borderline personality disorder.  Patient also reports being diagnosed with schizophrenia in the past when she was hospitalized, but does not think she has this either.  Psychiatrist: Patient has seen several psychiatrists in the past, Dr. Gibbons, Evie Farnsworth, and others that were at [facility]. She most recently was seen by Marita VALENCIA.   Therapist: Pt has seen therapist in the past. She recently started seeing Pooja at [facility] 9/2021.   Admission History: Patient reports being admitted to [facility] twice when she was a teenager and also once at [facility] in 2000.  Patient reports all admissions were due to suicide attempt of overdosing on pills.  Medications/Treatment: Patient reports being on many different medications and is unable to recall them.  She was previously on lithium (not helping symptoms), Prozac (nausea), Paxil (headache), Geodon, trazodone, Celexa, Abilify, Seroquel, and risperidone.  Self Harm: History of self-harm with punching a mirror and using broken glass to cut wrists.  Last self-harm was in 2012.  Suicide Attempts: History of suicide attempt with overdosing on pills x3.    Postpartum depression: Pt reports being diagnosed with postpartum depression with both of her daughters.      Labs:  WBC   Date Value Ref Range Status   01/16/2023 5.39 3.40 - 10.80 10*3/mm3 Final     Platelets   Date Value Ref Range Status   01/16/2023 222 140 - 450 10*3/mm3 Final     Hemoglobin   Date Value Ref Range Status   01/16/2023 12.7 12.0 - 15.9 g/dL Final     Hematocrit   Date Value Ref Range Status   01/16/2023 37.6 34.0 - 46.6 % Final     Glucose   Date Value Ref Range Status   01/16/2023 103 (H) 65 - 99 mg/dL Final     Creatinine   Date Value Ref Range Status   01/16/2023 0.89 0.57 - 1.00 mg/dL Final     ALT (SGPT)   Date Value Ref  Range Status   01/16/2023 18 1 - 33 U/L Final     AST (SGOT)   Date Value Ref Range Status   01/16/2023 16 1 - 32 U/L Final     BUN   Date Value Ref Range Status   01/16/2023 10 6 - 20 mg/dL Final     eGFR   Date Value Ref Range Status   01/16/2023 82.6 >60.0 mL/min/1.73 Final     Comment:     National Kidney Foundation and American Society of Nephrology (ASN) Task Force recommended calculation based on the Chronic Kidney Disease Epidemiology Collaboration (CKD-EPI) equation refit without adjustment for race.     Total Cholesterol   Date Value Ref Range Status   03/23/2022 163 0 - 200 mg/dL Final     Triglycerides   Date Value Ref Range Status   03/23/2022 108 0 - 150 mg/dL Final     HDL Cholesterol   Date Value Ref Range Status   03/23/2022 47 40 - 60 mg/dL Final     LDL Cholesterol    Date Value Ref Range Status   03/23/2022 96 0 - 100 mg/dL Final     VLDL Cholesterol   Date Value Ref Range Status   03/23/2022 20 5 - 40 mg/dL Final     LDL/HDL Ratio   Date Value Ref Range Status   03/23/2022 2.01  Final     Hemoglobin A1C   Date Value Ref Range Status   10/17/2022 5.50 4.80 - 5.60 % Final     TSH   Date Value Ref Range Status   01/16/2023 1.870 0.270 - 4.200 uIU/mL Final     Free T4   Date Value Ref Range Status   10/05/2020 1.2 0.9 - 1.8 ng/dL Final      Pain Management Panel     Pain Management Panel Latest Ref Rng & Units 1/17/2023 10/5/2020    AMPHETAMINES SCREEN, URINE NA - NEGATIVE    BARBITURATES SCREEN Negative Negative NEGATIVE    BENZODIAZEPINE SCREEN, URINE Negative Negative NEGATIVE    COCAINE SCREEN, URINE Negative Negative NEGATIVE    METHADONE SCREEN, URINE Negative Negative NEGATIVE           Imaging Results:  US Head Neck Soft Tissue    Result Date: 1/30/2023    1. Unremarkable thyroid.  No enlarged adenopathy is seen by ultrasound.  If persisting symptoms consider follow-up CT for more sensitive evaluation.     EMILY BENNETT MD       Electronically Signed and Approved By: EMILY BENNETT MD  on 1/30/2023 at 14:09               Current Medications:   Current Outpatient Medications   Medication Sig Dispense Refill   • Acetaminophen (TYLENOL 8 HOUR PO) Take  by mouth. AS NEEDED     • buPROPion XL (WELLBUTRIN XL) 300 MG 24 hr tablet Take 1 tab PO QAM. Take with 150mg tab for total dose of 450mg (Patient taking differently: Take 1 tab PO QAM. Take with 150mg tab for total dose of 450mg (PT(PATIENT) STATES SHE DC/ED THE 150MG PER PROVIDER'S INSTRUCTIONS)) 30 tablet 2   • lamoTRIgine (LaMICtal) 100 MG tablet Take 1 tablet by mouth Daily. 30 tablet 2   • meclizine (ANTIVERT) 25 MG tablet Take 2 tablets by mouth 3 (Three) Times a Day As Needed for Dizziness. 20 tablet 0   • Methylcobalamin (B12-ACTIVE PO) Take  by mouth. OTC GUMMIES + TABLETS     • solifenacin (VESICARE) 5 MG tablet Take 1 tablet by mouth Daily. 90 tablet 4   • Lurasidone HCl (Latuda) 20 MG tablet tablet Take 1 tablet by mouth Daily. 30 tablet 1     No current facility-administered medications for this visit.       Problem List:  Patient Active Problem List   Diagnosis   • Major depressive disorder, recurrent episode, moderate degree (HCC)   • PTSD (post-traumatic stress disorder)   • Borderline personality disorder in adult ([facility])   • Allergic rhinitis   • Bipolar disorder ([facility])   • Migraine without aura   • Urge incontinence   • Urinary incontinence   • Bladder spasm   • Body mass index (BMI) of 25.0-25.9 in adult   • Routine health maintenance   • Balance disorder   • Lightheaded       Allergy:   Allergies   Allergen Reactions   • Codeine Seizure     Other reaction(s): Seizure   • Prednisone Anaphylaxis        Discontinued Medications:  Medications Discontinued During This Encounter   Medication Reason   • lamoTRIgine (LaMICtal) 25 MG tablet Alternate therapy   • QUEtiapine (SEROquel) 50 MG tablet Alternate therapy       Mental Status Exam:   Appearance: good eye contact, normal street clothes, groomed, sitting in chair   Behavior: pleasant  and cooperative  Motor: no abnormal  Speech: normal rhythm, rate, volume, tone, not hyperverbal, not pressured, normal prosidy  Mood: Euthymic  Affect: Appropriate  Thought Content: negative suicidal ideations, negative homicidal ideations, negative obsessions  Perceptions: negative auditory hallucinations, negative visual hallucinations, negative delusions, negative paranoia  Thought Process: goal directed, linear  Insight/Judgement: fair/fair  Cognition: grossly intact  Attention: intact  Orientation: person, place, time and situation  Memory: intact    Review of Systems:   Constitutional: Denies fatigue, night sweats  Eyes: Denies double vision, blurred vision  HENT: Denies vertigo, recent head injury  Cardiovascular: Denies chest pain, irregular heartbeats  Respiratory: Denies productive cough, shortness of breath  Gastrointestinal: Denies nausea, vomiting  Genitourinary: Denies dysuria, urinary retention  Integument: Denies hair growth change, new skin lesions  Neurologic: Denies altered mental status, seizures  Musculoskeletal: Denies joint swelling, limitation of motion  Endocrine: Denies cold intolerance, heat intolerance  Psychiatric: See mental status exam  Allergic-immunologic: Denies frequent illnesses    Psychotherapy:     29 minutes of supportive psychotherapy with goal to strengthen defenses, promote problems solving, restore adaptive functioning and provide symptom relief. The therapeutic alliance was strengthened to encourage the patient to express their thoughts and feelings. Esteem building was enhanced through praise, reassurance, normalizing and encouragement. Coping skills were enhanced to build distress tolerance skills and emotional regulation. Allowed patient to freely discuss issues without interruption or judgement with unconditional positive regard, active listening skills, and empathy. Provided a safe, confidential environment to facilitate the development of a positive therapeutic  relationship and encourage open, honest communication. Assisted patient in identifying risk factors which would indicate the need for higher level of care including thoughts to harm self or others and/or self-harming behavior and encouraged patient to contact this office, call 911, or present to the nearest emergency room should any of these events occur. Assisted patient in processing session content; acknowledged and normalized patient’s thoughts, feelings, and concerns by utilizing a person-centered approach in efforts to build appropriate rapport and a positive therapeutic relationship with open and honest communication. Patient given education on medication side effects, diagnosis/illness and relapse symptoms. Plan to continue supportive psychotherapy in next appointment to provide symptom relief.        Diagnoses and all orders for this visit:    1. Bipolar disorder with moderate depression (HCC) (Primary)  -     Lurasidone HCl (Latuda) 20 MG tablet tablet; Take 1 tablet by mouth Daily.  Dispense: 30 tablet; Refill: 1    2. Borderline personality disorder (HCC)    3. Generalized anxiety disorder    4. Post traumatic stress disorder (PTSD)       PLAN:   Disontinue quetiapine  Continue lamictal  Decrease bupropion  mg  Start Lurasidone 20 Mg daily  Follow up 5 weeks  Presentation seems most consistent with bipolar depression.  BPD.  AUGUST.  And PTSD.  Will continue Lamictal and decrease bupropion XL for management of depression, anxiety, and overall mood.   Will start lurasidone to target depression, anxiety, mood stabilization.  We are discontinuing bupropion XL related to side effects of extreme weight loss.  We will discontinue quetiapine as it is causing her excessive sedation. Patient verbalized understanding and is agreeable to this plan.  Addressed all questions and concerns.  Continue psychotherapeutic modalities as indicated.    TREATMENT PLAN/GOALS:  Treatment plan: Continue supportive  psychotherapy efforts and medications as indicated. Continue to challenge patterns of living conducive to pathology, strengthen defenses, promote problems solving, restore adaptive functioning and provide symptom relief. Treatment and medication options discussed during today's visit. Patient acknowledged and verbally consented to continue with current treatment plan and was educated on the importance of compliance with treatment and follow-up appointments.  Functional status:Good  Prognosis: Good  Progress: Continued improvement    1. Safety: No acute safety concerns.   2. Therapy: Declines  3. Risk Assessment: Risk of self-harm acutely is moderate.  Risk factors include anxiety disorder, mood disorder, and recent psychosocial stressors (pandemic). Protective factors include no family history, denies access to guns/weapons, no present SI, minimal AODA, healthcare seeking, future orientation, willingness to engage in care.  Risk of self-harm chronically is also moderate, but could be further elevated in the event of treatment noncompliance and/or AODA.  4. Labs/studies: No labs/studies ordered at this time  Medications:   New Medications Ordered This Visit   Medications   • Lurasidone HCl (Latuda) 20 MG tablet tablet     Sig: Take 1 tablet by mouth Daily.     Dispense:  30 tablet     Refill:  1   5.    Medication Education:   LATUDA (LURASIDONE) Take with food. Risks, benefits, and alternatives discussed with patient including akathisia, sedation, dizziness/falls risk, nausea, low risk of weight gain & metabolic risks for diabetes and dyslipidemia, and rare tardive dyskinesia.  After discussion of these risks and benefits, the patient voiced understanding and agreed to proceed. Instructed to take medication with meal of minimum of 350 calories to improve consistent efficacy and absorption.   WELLBUTRIN XL (BUPROPION) Risks, benefits, alternatives discussed with patient including nausea, GI upset, increased energy,  exacerbation of irritability, insomnia, lowering of seizure threshold.  After discussion of these risks and benefits, the patient voiced understanding and agreed to proceed.  LAMICTAL (LAMOTRIGINE) Risks, benefits, alternatives discussed with patient including rash, rebound depressive or manic symptoms if prompt discontinuation, GI upset, agitation, sedation/falls risk.  After discussion of these risks and benefits, patient voiced understanding and agreed to proceed.    Medication Issues:  JENI reviewed as expected.  Discussed medication options and treatment plan of prescribed medication as well as the risks, benefits, and side effects including potential falls, possible impaired driving and metabolic adversities among others. Patient is agreeable to call the office with any worsening of symptoms or onset of side effects. Patient is agreeable to call 911 or go to the nearest ER should he/she begin having SI/HI.    6. Follow-up: Return in about 5 weeks (around 4/11/2023) for Recheck, Next scheduled follow up.         This document has been electronically signed by ERIK Pacheco  March 11, 2023 18:36 EST    Please note that portions of this note were completed with a voice recognition program.  Copied text in this note has been reviewed and is accurate as of 03/11/23    Answers for HPI/ROS submitted by the patient on 2/28/2023  Please describe your symptoms.: Meds check and my weight is dropping  Have you had these symptoms before?: No  How long have you been having these symptoms?: Greater than 2 weeks  Please list any medications you are currently taking for this condition.: BUPROPION 300MG AND 150MG, Solifenacin 5mg, Lamotrigine 100mg, Quetiapine 50mg only if I need it  Please describe any probable cause for these symptoms. : Borderline Personality Disorder, Depression, Anxiety  What is the primary reason for your visit?: Other

## 2023-03-08 ENCOUNTER — APPOINTMENT (OUTPATIENT)
Dept: CT IMAGING | Facility: HOSPITAL | Age: 43
End: 2023-03-08
Payer: COMMERCIAL

## 2023-03-08 ENCOUNTER — HOSPITAL ENCOUNTER (EMERGENCY)
Facility: HOSPITAL | Age: 43
Discharge: HOME | End: 2023-03-08
Attending: EMERGENCY MEDICINE | Admitting: EMERGENCY MEDICINE
Payer: COMMERCIAL

## 2023-03-08 ENCOUNTER — APPOINTMENT (OUTPATIENT)
Dept: GENERAL RADIOLOGY | Facility: HOSPITAL | Age: 43
End: 2023-03-08
Payer: COMMERCIAL

## 2023-03-08 VITALS
TEMPERATURE: 98.4 F | BODY MASS INDEX: 23.24 KG/M2 | WEIGHT: 166 LBS | HEART RATE: 112 BPM | RESPIRATION RATE: 20 BRPM | SYSTOLIC BLOOD PRESSURE: 145 MMHG | DIASTOLIC BLOOD PRESSURE: 99 MMHG | HEIGHT: 71 IN | OXYGEN SATURATION: 100 %

## 2023-03-08 DIAGNOSIS — M54.50 ACUTE RIGHT-SIDED LOW BACK PAIN WITHOUT SCIATICA: Primary | ICD-10-CM

## 2023-03-08 DIAGNOSIS — S39.012A STRAIN OF LUMBAR REGION, INITIAL ENCOUNTER: ICD-10-CM

## 2023-03-08 DIAGNOSIS — S16.1XXA STRAIN OF NECK MUSCLE, INITIAL ENCOUNTER: ICD-10-CM
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
## 2023-03-08 PROCEDURE — 72125 CT NECK SPINE W/O DYE: CPT

## 2023-03-08 PROCEDURE — 25010000002 KETOROLAC TROMETHAMINE PER 15 MG: Performed by: PHYSICIAN ASSISTANT

## 2023-03-08 PROCEDURE — 99283 EMERGENCY DEPT VISIT LOW MDM: CPT

## 2023-03-08 PROCEDURE — 72100 X-RAY EXAM L-S SPINE 2/3 VWS: CPT

## 2023-03-08 PROCEDURE — 97161 PT EVAL LOW COMPLEX 20 MIN: CPT | Performed by: PHYSICAL THERAPIST

## 2023-03-08 PROCEDURE — 97110 THERAPEUTIC EXERCISES: CPT | Performed by: PHYSICAL THERAPIST

## 2023-03-08 PROCEDURE — 70450 CT HEAD/BRAIN W/O DYE: CPT

## 2023-03-08 PROCEDURE — 96372 THER/PROPH/DIAG INJ SC/IM: CPT

## 2023-03-08 RX ORDER — KETOROLAC TROMETHAMINE 30 MG/ML
30 INJECTION, SOLUTION INTRAMUSCULAR; INTRAVENOUS ONCE
Status: COMPLETED | OUTPATIENT
Start: 2023-03-08 | End: 2023-03-08

## 2023-03-08 RX ADMIN — KETOROLAC TROMETHAMINE 30 MG: 30 INJECTION, SOLUTION INTRAMUSCULAR; INTRAVENOUS at 13:19
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
## 2023-03-10 ENCOUNTER — TRANSCRIBE ORDERS (OUTPATIENT)
Dept: ADMINISTRATIVE | Facility: HOSPITAL | Age: 43
End: 2023-03-10
Payer: COMMERCIAL

## 2023-03-10 DIAGNOSIS — Z12.31 SCREENING MAMMOGRAM FOR BREAST CANCER: Primary | ICD-10-CM

## 2023-03-21 ENCOUNTER — OFFICE VISIT (OUTPATIENT)
Dept: INTERNAL MEDICINE | Facility: CLINIC | Age: 43
End: 2023-03-21
Payer: COMMERCIAL

## 2023-03-21 VITALS
WEIGHT: 166 LBS | OXYGEN SATURATION: 98 % | BODY MASS INDEX: 23.24 KG/M2 | HEART RATE: 93 BPM | DIASTOLIC BLOOD PRESSURE: 78 MMHG | SYSTOLIC BLOOD PRESSURE: 108 MMHG | HEIGHT: 71 IN | TEMPERATURE: 97.6 F

## 2023-03-21 DIAGNOSIS — M54.41 ACUTE RIGHT-SIDED LOW BACK PAIN WITH RIGHT-SIDED SCIATICA: Primary | ICD-10-CM

## 2023-03-21 PROCEDURE — 99213 OFFICE O/P EST LOW 20 MIN: CPT

## 2023-03-21 RX ORDER — CYCLOBENZAPRINE HCL 5 MG
5 TABLET ORAL 3 TIMES DAILY PRN
Qty: 30 TABLET | Refills: 0 | Status: SHIPPED | OUTPATIENT
Start: 2023-03-21
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
## 2023-04-11 ENCOUNTER — OFFICE VISIT (OUTPATIENT)
Dept: BEHAVIORAL HEALTH | Facility: CLINIC | Age: 43
End: 2023-04-11
Payer: COMMERCIAL

## 2023-04-11 VITALS
SYSTOLIC BLOOD PRESSURE: 122 MMHG | DIASTOLIC BLOOD PRESSURE: 84 MMHG | HEART RATE: 98 BPM | WEIGHT: 177.3 LBS | HEIGHT: 71 IN | BODY MASS INDEX: 24.82 KG/M2

## 2023-04-11 DIAGNOSIS — F31.32 BIPOLAR DISORDER WITH MODERATE DEPRESSION: Primary | ICD-10-CM

## 2023-04-11 DIAGNOSIS — F43.10 POST TRAUMATIC STRESS DISORDER (PTSD): ICD-10-CM

## 2023-04-11 DIAGNOSIS — F60.3 BORDERLINE PERSONALITY DISORDER: ICD-10-CM

## 2023-04-11 DIAGNOSIS — F41.1 GENERALIZED ANXIETY DISORDER: ICD-10-CM

## 2023-04-11 DIAGNOSIS — G47.00 INSOMNIA, UNSPECIFIED TYPE: ICD-10-CM

## 2023-04-11 PROCEDURE — 90833 PSYTX W PT W E/M 30 MIN: CPT

## 2023-04-11 PROCEDURE — 1159F MED LIST DOCD IN RCRD: CPT

## 2023-04-11 PROCEDURE — 99214 OFFICE O/P EST MOD 30 MIN: CPT

## 2023-04-11 PROCEDURE — 1160F RVW MEDS BY RX/DR IN RCRD: CPT

## 2023-04-11 RX ORDER — LURASIDONE HYDROCHLORIDE 40 MG/1
40 TABLET, FILM COATED ORAL DAILY
Qty: 30 TABLET | Refills: 1 | Status: SHIPPED | OUTPATIENT
Start: 2023-04-11

## 2023-04-11 RX ORDER — BUPROPION HYDROCHLORIDE 150 MG/1
TABLET ORAL
COMMUNITY
Start: 2023-04-08 | End: 2023-04-11 | Stop reason: SINTOL

## 2023-04-11 RX ORDER — CARBAMAZEPINE 100 MG/1
TABLET, CHEWABLE ORAL
COMMUNITY
Start: 2023-03-25

## 2023-04-11 RX ORDER — GABAPENTIN 300 MG/1
1 CAPSULE ORAL EVERY 12 HOURS SCHEDULED
COMMUNITY
Start: 2023-03-24
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
## 2023-04-24 ENCOUNTER — HOSPITAL ENCOUNTER (OUTPATIENT)
Dept: MRI IMAGING | Facility: HOSPITAL | Age: 43
Discharge: HOME | End: 2023-04-24
Payer: COMMERCIAL

## 2023-04-24 DIAGNOSIS — M54.41 ACUTE RIGHT-SIDED LOW BACK PAIN WITH RIGHT-SIDED SCIATICA: ICD-10-CM

## 2023-04-24 DIAGNOSIS — M51.36 BULGING LUMBAR DISC: Primary | ICD-10-CM

## 2023-04-24 PROCEDURE — 72148 MRI LUMBAR SPINE W/O DYE: CPT

## 2023-04-24 NOTE — PATIENT INSTRUCTIONS
1.  Please return to clinic at your next scheduled visit.  Please contact the clinic ([phone]) at least 24 hours prior in the event you need to cancel.  2.  Do no harm to yourself or others.    3.  Avoid alcohol and drugs.    4.  Take all medications as prescribed.  Please contact the clinic with any concerns. If you are in need of medication refills, please call the clinic at [phone].    5. Should you want to get in touch with your provider, ERIK Pacheco, please contact the office ([phone]), and staff will be able to page Janine directly.  6. In the event you have personal crisis, contact the following crisis numbers: Suicide Prevention Hotline 1-433.504.4202; [facility] Helpline 4-494-504-RPSG; [facility] Room [phone]; text HELLO to 042591; or 642.     SPECIFIC RECOMMENDATIONS:     1.      Medications discussed at this encounter: LATUDA (LURASIDONE) Take with food. Risks, benefits, and alternatives discussed with patient including akathisia, sedation, dizziness/falls risk, nausea, low risk of weight gain & metabolic risks for diabetes and dyslipidemia, and rare tardive dyskinesia.  After discussion of these risks and benefits, the patient voiced understanding and agreed to proceed. Instructed to take medication with meal of minimum of 350 calories to improve consistent efficacy and absorption.                       2.      Psychotherapy recommendations: We will continue therapy at future visits.     3.     Return to clinic: 5 weeks

## 2023-05-16 ENCOUNTER — OFFICE VISIT (OUTPATIENT)
Dept: BEHAVIORAL HEALTH | Facility: CLINIC | Age: 43
End: 2023-05-16
Payer: COMMERCIAL

## 2023-05-16 VITALS
HEART RATE: 94 BPM | HEIGHT: 71 IN | WEIGHT: 181.4 LBS | DIASTOLIC BLOOD PRESSURE: 78 MMHG | SYSTOLIC BLOOD PRESSURE: 114 MMHG | BODY MASS INDEX: 25.4 KG/M2

## 2023-05-16 DIAGNOSIS — F31.32 BIPOLAR DISORDER WITH MODERATE DEPRESSION: Primary | ICD-10-CM

## 2023-05-16 DIAGNOSIS — F43.10 POST TRAUMATIC STRESS DISORDER (PTSD): ICD-10-CM

## 2023-05-16 DIAGNOSIS — F41.1 GENERALIZED ANXIETY DISORDER: ICD-10-CM

## 2023-05-16 DIAGNOSIS — G47.00 INSOMNIA, UNSPECIFIED TYPE: ICD-10-CM

## 2023-05-16 DIAGNOSIS — F60.3 BORDERLINE PERSONALITY DISORDER: ICD-10-CM

## 2023-05-16 PROBLEM — B02.9 SHINGLES: Status: ACTIVE | Noted: 2023-05-16

## 2023-05-16 PROBLEM — F41.9 ANXIETY: Status: ACTIVE | Noted: 2023-05-16

## 2023-05-16 PROBLEM — F32.A DEPRESSION: Status: ACTIVE | Noted: 2021-10-27

## 2023-05-16 RX ORDER — LAMOTRIGINE 100 MG/1
100 TABLET ORAL DAILY
Qty: 30 TABLET | Refills: 2 | Status: SHIPPED | OUTPATIENT
Start: 2023-05-16 | End: 2024-05-15

## 2023-05-16 NOTE — PATIENT INSTRUCTIONS
1.  Please return to clinic at your next scheduled visit.  Please contact the clinic ([phone]) at least 24 hours prior in the event you need to cancel.  2.  Do no harm to yourself or others.    3.  Avoid alcohol and drugs.    4.  Take all medications as prescribed.  Please contact the clinic with any concerns. If you are in need of medication refills, please call the clinic at [phone].    5. Should you want to get in touch with your provider, ERIK Pacheco, please contact the office ([phone]), and staff will be able to page Janine directly.  6. In the event you have personal crisis, contact the following crisis numbers: Suicide Prevention Hotline 1-136.144.1441; [facility] Helpline 1-403-226-ILBM; [facility] Room [phone]; text HELLO to 790802; or 326.     SPECIFIC RECOMMENDATIONS:     1.      Medications discussed at this encounter: Latuda, Lamictal                    2.      Psychotherapy recommendations: We will continue therapy at future visits.     3.     Return to clinic:  6 Weeks
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
## 2023-05-24 ENCOUNTER — OFFICE VISIT (OUTPATIENT)
Dept: INTERNAL MEDICINE | Facility: CLINIC | Age: 43
End: 2023-05-24
Payer: COMMERCIAL

## 2023-05-24 VITALS
DIASTOLIC BLOOD PRESSURE: 60 MMHG | HEART RATE: 86 BPM | BODY MASS INDEX: 25.38 KG/M2 | OXYGEN SATURATION: 100 % | WEIGHT: 182 LBS | RESPIRATION RATE: 16 BRPM | TEMPERATURE: 97.2 F | SYSTOLIC BLOOD PRESSURE: 100 MMHG

## 2023-05-24 DIAGNOSIS — M54.41 ACUTE RIGHT-SIDED LOW BACK PAIN WITH RIGHT-SIDED SCIATICA: ICD-10-CM

## 2023-05-24 DIAGNOSIS — F33.1 MAJOR DEPRESSIVE DISORDER, RECURRENT EPISODE, MODERATE DEGREE: Primary | ICD-10-CM

## 2023-05-24 PROCEDURE — 99213 OFFICE O/P EST LOW 20 MIN: CPT | Performed by: PHYSICIAN ASSISTANT

## 2023-05-24 RX ORDER — NAPROXEN 500 MG/1
500 TABLET ORAL 2 TIMES DAILY WITH MEALS
Qty: 60 TABLET | Refills: 1 | Status: SHIPPED | OUTPATIENT
Start: 2023-05-24

## 2023-05-24 RX ORDER — CYCLOBENZAPRINE HCL 5 MG
5 TABLET ORAL DAILY PRN
Qty: 30 TABLET | Refills: 0 | Status: SHIPPED | OUTPATIENT
Start: 2023-05-24
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
## 2023-05-25 NOTE — ASSESSMENT & PLAN NOTE
Patient's mood is stable. She will cont follow up with psych as directed for counseling and med mgmt. To er if si/hi

## 2023-05-30 ENCOUNTER — PATIENT ROUNDING (BHMG ONLY) (OUTPATIENT)
Dept: NEUROSURGERY | Facility: CLINIC | Age: 43
End: 2023-05-30

## 2023-05-30 ENCOUNTER — TELEPHONE (OUTPATIENT)
Dept: NEUROSURGERY | Facility: CLINIC | Age: 43
End: 2023-05-30

## 2023-05-30 ENCOUNTER — OFFICE VISIT (OUTPATIENT)
Dept: NEUROSURGERY | Facility: CLINIC | Age: 43
End: 2023-05-30

## 2023-05-30 VITALS
DIASTOLIC BLOOD PRESSURE: 76 MMHG | SYSTOLIC BLOOD PRESSURE: 114 MMHG | HEIGHT: 71 IN | BODY MASS INDEX: 24.78 KG/M2 | WEIGHT: 177 LBS | HEART RATE: 89 BPM

## 2023-05-30 DIAGNOSIS — M54.41 CHRONIC MIDLINE LOW BACK PAIN WITH BILATERAL SCIATICA: ICD-10-CM

## 2023-05-30 DIAGNOSIS — G89.29 CHRONIC MIDLINE LOW BACK PAIN WITH BILATERAL SCIATICA: ICD-10-CM

## 2023-05-30 DIAGNOSIS — M54.42 CHRONIC MIDLINE LOW BACK PAIN WITH BILATERAL SCIATICA: ICD-10-CM

## 2023-05-30 DIAGNOSIS — M47.816 LUMBAR FACET ARTHROPATHY: ICD-10-CM

## 2023-05-30 DIAGNOSIS — R30.0 DYSURIA: Primary | ICD-10-CM

## 2023-05-30 DIAGNOSIS — M51.36 DDD (DEGENERATIVE DISC DISEASE), LUMBAR: Primary | ICD-10-CM

## 2023-05-30 RX ORDER — DIPHENOXYLATE HYDROCHLORIDE AND ATROPINE SULFATE 2.5; .025 MG/1; MG/1
TABLET ORAL DAILY
COMMUNITY
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
## 2023-05-30 NOTE — TELEPHONE ENCOUNTER
TRIED CALLING PATIENT. VM NOT SET UP. PT APPT FOR 05/30 WILL NEED TO BE RESCHEDULED. VAL IS OUT SICK    OK FOR HUB TO READ AND RESCHEDULE.

## 2023-05-31 ENCOUNTER — HOSPITAL ENCOUNTER (OUTPATIENT)
Dept: MAMMOGRAPHY | Facility: HOSPITAL | Age: 43
Discharge: HOME | End: 2023-05-31

## 2023-05-31 ENCOUNTER — LAB (OUTPATIENT)
Dept: LAB | Facility: HOSPITAL | Age: 43
End: 2023-05-31

## 2023-05-31 DIAGNOSIS — Z12.31 SCREENING MAMMOGRAM FOR BREAST CANCER: ICD-10-CM

## 2023-05-31 DIAGNOSIS — R30.0 DYSURIA: ICD-10-CM

## 2023-05-31 LAB
BACTERIA UR QL AUTO: ABNORMAL /HPF
BILIRUB UR QL STRIP: NEGATIVE
CLARITY UR: CLEAR
COLOR UR: YELLOW
GLUCOSE UR STRIP-MCNC: NEGATIVE MG/DL
HGB UR QL STRIP.AUTO: NEGATIVE
HYALINE CASTS UR QL AUTO: ABNORMAL /LPF
KETONES UR QL STRIP: NEGATIVE
LEUKOCYTE ESTERASE UR QL STRIP.AUTO: ABNORMAL
NITRITE UR QL STRIP: NEGATIVE
PH UR STRIP.AUTO: 5.5 [PH] (ref 5–8)
PROT UR QL STRIP: ABNORMAL
RBC # UR STRIP: ABNORMAL /HPF
REF LAB TEST METHOD: ABNORMAL
SP GR UR STRIP: 1.03 (ref 1–1.03)
SQUAMOUS #/AREA URNS HPF: ABNORMAL /HPF
UROBILINOGEN UR QL STRIP: ABNORMAL
WBC # UR STRIP: ABNORMAL /HPF

## 2023-05-31 PROCEDURE — 87086 URINE CULTURE/COLONY COUNT: CPT

## 2023-05-31 PROCEDURE — 77067 SCR MAMMO BI INCL CAD: CPT

## 2023-05-31 PROCEDURE — 77063 BREAST TOMOSYNTHESIS BI: CPT

## 2023-05-31 PROCEDURE — 81001 URINALYSIS AUTO W/SCOPE: CPT

## 2023-06-01 DIAGNOSIS — R92.8 ABNORMAL MAMMOGRAM: Primary | ICD-10-CM

## 2023-06-01 LAB — BACTERIA SPEC AEROBE CULT: NORMAL

## 2023-06-02 ENCOUNTER — TELEPHONE (OUTPATIENT)
Dept: NEUROSURGERY | Facility: CLINIC | Age: 43
End: 2023-06-02
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
## 2023-06-03 DIAGNOSIS — F31.32 BIPOLAR DISORDER WITH MODERATE DEPRESSION: ICD-10-CM

## 2023-06-05 RX ORDER — LURASIDONE HYDROCHLORIDE 40 MG/1
TABLET, FILM COATED ORAL
Qty: 30 TABLET | Refills: 1 | Status: SHIPPED | OUTPATIENT
Start: 2023-06-05

## 2023-06-05 NOTE — TELEPHONE ENCOUNTER
NO PROTOCOLS     NEXT APPT WITH PROVIDER  Appointment with Janine Hummel APRN (06/27/2023)     LAST MED REFILL  lurasidone (LATUDA) 40 MG tablet tablet (04/11/2023)     PROVIDER PLEASE ADVISE

## 2023-06-13 ENCOUNTER — HOSPITAL ENCOUNTER (OUTPATIENT)
Dept: MAMMOGRAPHY | Facility: HOSPITAL | Age: 43
Discharge: HOME | End: 2023-06-13
Payer: COMMERCIAL

## 2023-06-13 ENCOUNTER — HOSPITAL ENCOUNTER (OUTPATIENT)
Dept: ULTRASOUND IMAGING | Facility: HOSPITAL | Age: 43
Discharge: HOME | End: 2023-06-13
Payer: COMMERCIAL

## 2023-06-13 DIAGNOSIS — R92.8 ABNORMAL MAMMOGRAM: ICD-10-CM

## 2023-06-13 PROCEDURE — 77065 DX MAMMO INCL CAD UNI: CPT

## 2023-06-13 PROCEDURE — G0279 TOMOSYNTHESIS, MAMMO: HCPCS

## 2023-06-13 PROCEDURE — 76642 ULTRASOUND BREAST LIMITED: CPT

## 2023-07-24 RX ORDER — NAPROXEN 500 MG/1
TABLET ORAL
Qty: 60 TABLET | Refills: 1 | Status: SHIPPED | OUTPATIENT
Start: 2023-07-24

## 2023-08-05 DIAGNOSIS — F31.32 BIPOLAR DISORDER WITH MODERATE DEPRESSION: ICD-10-CM

## 2023-08-06 DIAGNOSIS — F41.1 GENERALIZED ANXIETY DISORDER: ICD-10-CM

## 2023-08-06 DIAGNOSIS — F33.2 SEVERE EPISODE OF RECURRENT MAJOR DEPRESSIVE DISORDER, WITHOUT PSYCHOTIC FEATURES: ICD-10-CM

## 2023-08-06 DIAGNOSIS — F43.10 POST TRAUMATIC STRESS DISORDER (PTSD): ICD-10-CM

## 2023-08-06 DIAGNOSIS — F60.3 BORDERLINE PERSONALITY DISORDER: ICD-10-CM

## 2023-08-07 ENCOUNTER — OFFICE VISIT (OUTPATIENT)
Dept: BEHAVIORAL HEALTH | Facility: CLINIC | Age: 43
End: 2023-08-07
Payer: COMMERCIAL

## 2023-08-07 VITALS
DIASTOLIC BLOOD PRESSURE: 82 MMHG | WEIGHT: 183 LBS | HEIGHT: 71 IN | SYSTOLIC BLOOD PRESSURE: 120 MMHG | OXYGEN SATURATION: 100 % | HEART RATE: 67 BPM | BODY MASS INDEX: 25.62 KG/M2

## 2023-08-07 DIAGNOSIS — F43.10 POST TRAUMATIC STRESS DISORDER (PTSD): ICD-10-CM

## 2023-08-07 DIAGNOSIS — F41.1 GENERALIZED ANXIETY DISORDER: ICD-10-CM

## 2023-08-07 DIAGNOSIS — F31.32 BIPOLAR DISORDER WITH MODERATE DEPRESSION: ICD-10-CM

## 2023-08-07 DIAGNOSIS — F60.3 BORDERLINE PERSONALITY DISORDER: Primary | ICD-10-CM

## 2023-08-07 RX ORDER — LURASIDONE HYDROCHLORIDE 40 MG/1
TABLET, FILM COATED ORAL
Qty: 30 TABLET | Refills: 1 | Status: SHIPPED | OUTPATIENT
Start: 2023-08-07

## 2023-08-07 RX ORDER — BUPROPION HYDROCHLORIDE 150 MG/1
TABLET ORAL
Qty: 30 TABLET | Refills: 2 | OUTPATIENT
Start: 2023-08-07

## 2023-08-07 RX ORDER — LAMOTRIGINE 100 MG/1
TABLET ORAL
Qty: 90 TABLET | Refills: 1 | Status: SHIPPED | OUTPATIENT
Start: 2023-08-07

## 2023-08-07 NOTE — TELEPHONE ENCOUNTER
Medication refill request for Latuda 40mg.     lurasidone (LATUDA) 40 MG tablet tablet (06/05/2023)     Pt has appt today  Appointment with Janine Hummel APRN (08/07/2023)     Medication order pended.

## 2023-08-07 NOTE — PROGRESS NOTES
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 contact this office, call 911, or present to the nearest emergency room should any of these events occur. Assisted patient in processing session content; acknowledged and normalized patient's thoughts, feelings, and concerns by utilizing a person-centered approach in efforts to build appropriate rapport and a positive therapeutic relationship with open and honest communication. Patient given education on medication side effects, diagnosis/illness and relapse symptoms. Plan to continue supportive psychotherapy in next appointment to provide symptom relief.      Risk Assessment: Risk of self-harm acutely and chronically is moderate.    Risk factors include anxiety disorder, mood disorder, and recent psychosocial stressors.   Protective factors include no family history, denies access to guns/weapons, no present SI, minimal AODA, healthcare seeking, future orientation, willingness to engage in care.    Risk assessment could be further elevated in the event of treatment noncompliance and/or AODA.  Labs/studies: No labs/studies ordered at this time  Medications: No orders of the defined types were placed in this  encounter.     Medication Education:   LATUDA (LURASIDONE) Take with food. Risks, benefits, and alternatives discussed with patient including akathisia, sedation, dizziness/falls risk, nausea, low risk of weight gain & metabolic risks for diabetes and dyslipidemia, and rare tardive dyskinesia.  After discussion of these risks and benefits, the patient voiced understanding and agreed to proceed. Instructed to take medication with meal of minimum of 350 calories to improve consistent efficacy and absorption.   LAMICTAL (LAMOTRIGINE) Risks, benefits, alternatives discussed with patient including rash, rebound depressive or manic symptoms if prompt discontinuation, GI upset, agitation, sedation/falls risk.  After discussion of these risks and benefits, patient voiced understanding and agreed to proceed.    JENI reviewed as expected.  Follow-up: Return in about 6 weeks (around 9/18/2023).         This document has been electronically signed by ERIK Pacheco  August 20, 2023 21:17 EDT    Please note that portions of this note were completed with a voice recognition program.  Copied text in this note has been reviewed and is accurate as of 08/20/23

## 2023-08-07 NOTE — TELEPHONE ENCOUNTER
LAMOTRIGINE 100 MG TABLET   Last refill:5/16/2023  BUPROPION HCL  MG TABLET   Last refill:2/18/2023  FOLLOW UP 08/07/2023

## 2023-08-21 NOTE — PATIENT INSTRUCTIONS
1.  Please return to clinic at your next scheduled visit.  Please contact the clinic ([phone]) at least 24 hours prior in the event you need to cancel.  2.  Do no harm to yourself or others.    3.  Avoid alcohol and drugs.    4.  Take all medications as prescribed.  Please contact the clinic with any concerns. If you are in need of medication refills, please call the clinic at [phone].    5. Should you want to get in touch with your provider, ERIK Pacheco, please contact the office ([phone]), and staff will be able to page Janine directly.  6. In the event you have personal crisis, contact the following crisis numbers: Suicide Prevention Hotline 1-926.642.2340; [facility] Helpline 1-404-875-ANXG; [facility] Emergency Room [phone]; text HELLO to [phone]; or 937.     SPECIFIC RECOMMENDATIONS:     1.      Medications discussed at this encounter:                     2.      Psychotherapy recommendations: We will continue therapy at future visits.     3.     Return to clinic:  6 Weeks

## 2023-08-22 DIAGNOSIS — M54.41 ACUTE RIGHT-SIDED LOW BACK PAIN WITH RIGHT-SIDED SCIATICA: ICD-10-CM

## 2023-08-22 RX ORDER — CYCLOBENZAPRINE HCL 5 MG
5 TABLET ORAL DAILY PRN
Qty: 30 TABLET | Refills: 0 | Status: SHIPPED | OUTPATIENT
Start: 2023-08-22

## 2023-08-23 ENCOUNTER — OFFICE VISIT (OUTPATIENT)
Dept: INTERNAL MEDICINE | Facility: CLINIC | Age: 43
End: 2023-08-23
Payer: COMMERCIAL

## 2023-08-23 VITALS
OXYGEN SATURATION: 98 % | HEIGHT: 71 IN | BODY MASS INDEX: 25.62 KG/M2 | SYSTOLIC BLOOD PRESSURE: 112 MMHG | TEMPERATURE: 97.1 F | WEIGHT: 183 LBS | DIASTOLIC BLOOD PRESSURE: 78 MMHG | HEART RATE: 82 BPM

## 2023-08-23 DIAGNOSIS — M51.36 BULGING LUMBAR DISC: ICD-10-CM

## 2023-08-23 DIAGNOSIS — F33.1 MAJOR DEPRESSIVE DISORDER, RECURRENT EPISODE, MODERATE DEGREE: Primary | ICD-10-CM

## 2023-08-23 DIAGNOSIS — Z11.59 SCREENING FOR VIRAL DISEASE: ICD-10-CM

## 2023-08-23 DIAGNOSIS — Z13.29 SCREENING FOR THYROID DISORDER: ICD-10-CM

## 2023-08-23 DIAGNOSIS — Z13.220 SCREENING FOR CHOLESTEROL LEVEL: ICD-10-CM

## 2023-08-23 LAB
ALBUMIN SERPL-MCNC: 4.2 G/DL (ref 3.5–5.2)
ALBUMIN/GLOB SERPL: 1.4 G/DL
ALP SERPL-CCNC: 52 U/L (ref 39–117)
ALT SERPL W P-5'-P-CCNC: 13 U/L (ref 1–33)
ANION GAP SERPL CALCULATED.3IONS-SCNC: 8.5 MMOL/L (ref 5–15)
AST SERPL-CCNC: 14 U/L (ref 1–32)
BASOPHILS # BLD AUTO: 0.03 10*3/MM3 (ref 0–0.2)
BASOPHILS NFR BLD AUTO: 0.5 % (ref 0–1.5)
BILIRUB SERPL-MCNC: 0.3 MG/DL (ref 0–1.2)
BUN SERPL-MCNC: 11 MG/DL (ref 6–20)
BUN/CREAT SERPL: 13.9 (ref 7–25)
CALCIUM SPEC-SCNC: 9.1 MG/DL (ref 8.6–10.5)
CHLORIDE SERPL-SCNC: 105 MMOL/L (ref 98–107)
CHOLEST SERPL-MCNC: 174 MG/DL (ref 0–200)
CO2 SERPL-SCNC: 24.5 MMOL/L (ref 22–29)
CREAT SERPL-MCNC: 0.79 MG/DL (ref 0.57–1)
DEPRECATED RDW RBC AUTO: 41.5 FL (ref 37–54)
EGFRCR SERPLBLD CKD-EPI 2021: 94.7 ML/MIN/1.73
EOSINOPHIL # BLD AUTO: 0.06 10*3/MM3 (ref 0–0.4)
EOSINOPHIL NFR BLD AUTO: 1 % (ref 0.3–6.2)
ERYTHROCYTE [DISTWIDTH] IN BLOOD BY AUTOMATED COUNT: 12.2 % (ref 12.3–15.4)
GLOBULIN UR ELPH-MCNC: 3.1 GM/DL
GLUCOSE SERPL-MCNC: 82 MG/DL (ref 65–99)
HCT VFR BLD AUTO: 39.8 % (ref 34–46.6)
HCV AB SER DONR QL: NORMAL
HDLC SERPL-MCNC: 58 MG/DL (ref 40–60)
HGB BLD-MCNC: 13.2 G/DL (ref 12–15.9)
IMM GRANULOCYTES # BLD AUTO: 0.01 10*3/MM3 (ref 0–0.05)
IMM GRANULOCYTES NFR BLD AUTO: 0.2 % (ref 0–0.5)
LDLC SERPL CALC-MCNC: 99 MG/DL (ref 0–100)
LDLC/HDLC SERPL: 1.67 {RATIO}
LYMPHOCYTES # BLD AUTO: 2.14 10*3/MM3 (ref 0.7–3.1)
LYMPHOCYTES NFR BLD AUTO: 34.4 % (ref 19.6–45.3)
MCH RBC QN AUTO: 30.5 PG (ref 26.6–33)
MCHC RBC AUTO-ENTMCNC: 33.2 G/DL (ref 31.5–35.7)
MCV RBC AUTO: 91.9 FL (ref 79–97)
MONOCYTES # BLD AUTO: 0.49 10*3/MM3 (ref 0.1–0.9)
MONOCYTES NFR BLD AUTO: 7.9 % (ref 5–12)
NEUTROPHILS NFR BLD AUTO: 3.49 10*3/MM3 (ref 1.7–7)
NEUTROPHILS NFR BLD AUTO: 56 % (ref 42.7–76)
NRBC BLD AUTO-RTO: 0 /100 WBC (ref 0–0.2)
PLATELET # BLD AUTO: 249 10*3/MM3 (ref 140–450)
PMV BLD AUTO: 10.6 FL (ref 6–12)
POTASSIUM SERPL-SCNC: 4.2 MMOL/L (ref 3.5–5.2)
PROT SERPL-MCNC: 7.3 G/DL (ref 6–8.5)
RBC # BLD AUTO: 4.33 10*6/MM3 (ref 3.77–5.28)
SODIUM SERPL-SCNC: 138 MMOL/L (ref 136–145)
TRIGL SERPL-MCNC: 95 MG/DL (ref 0–150)
TSH SERPL DL<=0.05 MIU/L-ACNC: 1.02 UIU/ML (ref 0.27–4.2)
VLDLC SERPL-MCNC: 17 MG/DL (ref 5–40)
WBC NRBC COR # BLD: 6.22 10*3/MM3 (ref 3.4–10.8)

## 2023-08-23 PROCEDURE — 80061 LIPID PANEL: CPT | Performed by: PHYSICIAN ASSISTANT

## 2023-08-23 PROCEDURE — 1160F RVW MEDS BY RX/DR IN RCRD: CPT | Performed by: PHYSICIAN ASSISTANT

## 2023-08-23 PROCEDURE — 85025 COMPLETE CBC W/AUTO DIFF WBC: CPT | Performed by: PHYSICIAN ASSISTANT

## 2023-08-23 PROCEDURE — 86803 HEPATITIS C AB TEST: CPT | Performed by: PHYSICIAN ASSISTANT

## 2023-08-23 PROCEDURE — 84443 ASSAY THYROID STIM HORMONE: CPT | Performed by: PHYSICIAN ASSISTANT

## 2023-08-23 PROCEDURE — 80053 COMPREHEN METABOLIC PANEL: CPT | Performed by: PHYSICIAN ASSISTANT

## 2023-08-23 PROCEDURE — 99213 OFFICE O/P EST LOW 20 MIN: CPT | Performed by: PHYSICIAN ASSISTANT

## 2023-08-23 PROCEDURE — 1159F MED LIST DOCD IN RCRD: CPT | Performed by: PHYSICIAN ASSISTANT
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
## 2023-09-11 ENCOUNTER — OFFICE VISIT (OUTPATIENT)
Dept: BEHAVIORAL HEALTH | Facility: CLINIC | Age: 43
End: 2023-09-11
Payer: COMMERCIAL

## 2023-09-11 VITALS
HEIGHT: 71 IN | HEART RATE: 83 BPM | WEIGHT: 185.9 LBS | DIASTOLIC BLOOD PRESSURE: 80 MMHG | BODY MASS INDEX: 26.02 KG/M2 | SYSTOLIC BLOOD PRESSURE: 113 MMHG

## 2023-09-11 DIAGNOSIS — F60.3 BORDERLINE PERSONALITY DISORDER: ICD-10-CM

## 2023-09-11 DIAGNOSIS — F31.32 BIPOLAR DISORDER WITH MODERATE DEPRESSION: Primary | ICD-10-CM

## 2023-09-11 DIAGNOSIS — F43.10 POST TRAUMATIC STRESS DISORDER (PTSD): ICD-10-CM

## 2023-09-11 DIAGNOSIS — F41.1 GENERALIZED ANXIETY DISORDER: ICD-10-CM

## 2023-09-11 NOTE — PROGRESS NOTES
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 tolerance skills and emotional regulation. Allowed patient to freely discuss issues without interruption or judgement with unconditional positive regard, active listening skills, and empathy. Provided a safe, confidential environment to facilitate the development of a positive therapeutic relationship and encourage open, honest communication. Assisted patient in identifying risk factors which would indicate the need for higher level of care including thoughts to harm self or others and/or self-harming behavior and encouraged patient to contact this office, call 911, or present to the nearest emergency room should any of these events occur. Assisted patient in processing session content; acknowledged and normalized patient’s thoughts, feelings, and concerns by utilizing a person-centered approach in efforts to build appropriate rapport and a positive therapeutic relationship with open and honest communication. Patient given education on medication side effects, diagnosis/illness and relapse symptoms. Plan to continue supportive psychotherapy in next appointment to provide symptom relief.      Risk Assessment: Risk of self-harm acutely and chronically is moderate.    Risk factors include anxiety disorder, mood disorder, and recent psychosocial  stressors.   Protective factors include no family history, denies access to guns/weapons, no present SI, no history of suicide attempts or self-harm in the past, minimal AODA, healthcare seeking, future orientation, willingness to engage in care.    Risk assessment could be further elevated in the event of treatment noncompliance and/or AODA.  Labs/studies: No labs/studies ordered at this time  Medications: No orders of the defined types were placed in this encounter.     Medication Education:   LATUDA (LURASIDONE) Take with food. Risks, benefits, and alternatives discussed with patient including akathisia, sedation, dizziness/falls risk, nausea, low risk of weight gain & metabolic risks for diabetes and dyslipidemia, and rare tardive dyskinesia.  After discussion of these risks and benefits, the patient voiced understanding and agreed to proceed. Instructed to take medication with meal of minimum of 350 calories to improve consistent efficacy and absorption.   LAMICTAL (LAMOTRIGINE) Risks, benefits, alternatives discussed with patient including rash, rebound depressive or manic symptoms if prompt discontinuation, GI upset, agitation, sedation/falls risk.  After discussion of these risks and benefits, patient voiced understanding and agreed to proceed.      Follow-up: Return in about 6 weeks (around 10/23/2023) for Next scheduled follow up.         This document has been electronically signed by ERIK Pacheco  September 18, 2023 22:25 EDT    Please note that portions of this note were completed with a voice recognition program.  Copied text in this note has been reviewed and is accurate as of 09/18/23

## 2023-09-19 NOTE — PATIENT INSTRUCTIONS
1.  Please return to clinic at your next scheduled visit.  Please contact the clinic ([phone]) at least 24 hours prior in the event you need to cancel.  2.  Do no harm to yourself or others.    3.  Avoid alcohol and drugs.    4.  Take all medications as prescribed.  Please contact the clinic with any concerns. If you are in need of medication refills, please call the clinic at [phone].    5. Should you want to get in touch with your provider, ERIK Pacheco, please contact the office ([phone]), and staff will be able to page Janine directly.  6. In the event you have personal crisis, contact the following crisis numbers: Suicide Prevention Hotline 1-441.189.1021; [facility] Helpline 8-342-266-CUIN; [facility] Emergency Room [phone]; text HELLO to 654394; or 923.     SPECIFIC RECOMMENDATIONS:     1.      Medications discussed at this encounter:     No orders of the defined types were placed in this encounter.                      2.      Psychotherapy recommendations: We will continue therapy at future visits.     3.     Return to clinic: Return in about 6 weeks (around 10/23/2023) for Next scheduled follow up.

## 2023-09-25 DIAGNOSIS — F31.32 BIPOLAR DISORDER WITH MODERATE DEPRESSION: ICD-10-CM

## 2023-09-25 RX ORDER — LURASIDONE HYDROCHLORIDE 40 MG/1
TABLET, FILM COATED ORAL
Qty: 30 TABLET | Refills: 1 | Status: SHIPPED | OUTPATIENT
Start: 2023-09-25

## 2023-09-25 RX ORDER — NAPROXEN 500 MG/1
TABLET ORAL
Qty: 60 TABLET | Refills: 1 | Status: SHIPPED | OUTPATIENT
Start: 2023-09-25

## 2023-09-26 DIAGNOSIS — M54.41 ACUTE RIGHT-SIDED LOW BACK PAIN WITH RIGHT-SIDED SCIATICA: ICD-10-CM

## 2023-09-26 RX ORDER — CYCLOBENZAPRINE HCL 5 MG
5 TABLET ORAL DAILY PRN
Qty: 30 TABLET | Refills: 0 | Status: SHIPPED | OUTPATIENT
Start: 2023-09-26

## 2023-10-23 ENCOUNTER — OFFICE VISIT (OUTPATIENT)
Dept: BEHAVIORAL HEALTH | Facility: CLINIC | Age: 43
End: 2023-10-23
Payer: COMMERCIAL

## 2023-10-23 VITALS
DIASTOLIC BLOOD PRESSURE: 78 MMHG | HEART RATE: 94 BPM | WEIGHT: 190.9 LBS | BODY MASS INDEX: 26.73 KG/M2 | HEIGHT: 71 IN | SYSTOLIC BLOOD PRESSURE: 118 MMHG | OXYGEN SATURATION: 99 %

## 2023-10-23 DIAGNOSIS — F43.10 POST TRAUMATIC STRESS DISORDER (PTSD): ICD-10-CM

## 2023-10-23 DIAGNOSIS — G47.00 INSOMNIA, UNSPECIFIED TYPE: ICD-10-CM

## 2023-10-23 DIAGNOSIS — F41.1 GENERALIZED ANXIETY DISORDER: ICD-10-CM

## 2023-10-23 DIAGNOSIS — F60.3 BORDERLINE PERSONALITY DISORDER: ICD-10-CM

## 2023-10-23 DIAGNOSIS — F31.32 BIPOLAR DISORDER WITH MODERATE DEPRESSION: Primary | ICD-10-CM

## 2023-10-23 NOTE — PROGRESS NOTES
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 acute safety concerns.   Psychotherapy:      40 minutes of supportive psychotherapy with goal to strengthen defenses, promote problems solving, restore adaptive functioning and provide symptom relief. The therapeutic alliance was strengthened to encourage the patient to express their thoughts and feelings. Esteem building was enhanced through praise, reassurance, normalizing and encouragement. Coping skills were enhanced to build distress tolerance skills and emotional regulation. Allowed patient to freely discuss issues without interruption or judgement with unconditional positive regard, active listening skills, and empathy. Provided a safe, confidential environment to facilitate the development of a positive therapeutic relationship and encourage open, honest communication. Assisted patient in identifying risk factors which would indicate the need for higher level of care including thoughts to harm self or others and/or self-harming behavior and encouraged patient to contact this office, call 911, or present to the nearest emergency room should any of these events occur. Assisted patient in processing session content; acknowledged and normalized patient’s thoughts, feelings, and concerns by utilizing a person-centered approach in efforts  to build appropriate rapport and a positive therapeutic relationship with open and honest communication. Patient given education on medication side effects, diagnosis/illness and relapse symptoms. Plan to continue supportive psychotherapy in next appointment to provide symptom relief.      Risk Assessment: Risk of self-harm acutely and chronically is moderate to severe.    Risk factors include anxiety disorder, mood disorder, and recent psychosocial stressors.   Protective factors include no family history, denies access to guns/weapons, no present SI, minimal AODA, healthcare seeking, future orientation, willingness to engage in care.    Risk assessment could be further elevated in the event of treatment noncompliance and/or AODA.  Labs/studies: No labs/studies ordered at this time  Medications: No orders of the defined types were placed in this encounter.     Medication Education:   LATUDA (LURASIDONE) Take with food. Risks, benefits, and alternatives discussed with patient including akathisia, sedation, dizziness/falls risk, nausea, low risk of weight gain & metabolic risks for diabetes and dyslipidemia, and rare tardive dyskinesia.  After discussion of these risks and benefits, the patient voiced understanding and agreed to proceed. Instructed to take medication with meal of minimum of 350 calories to improve consistent efficacy and absorption.   LAMICTAL (LAMOTRIGINE) Risks, benefits, alternatives discussed with patient including rash, rebound depressive or manic symptoms if prompt discontinuation, GI upset, agitation, sedation/falls risk.  After discussion of these risks and benefits, patient voiced understanding and agreed to proceed.      Follow-up: Return in about 6 weeks (around 12/4/2023) for Next scheduled follow up.         This document has been electronically signed by ERIK Pacheco  October 28, 2023 14:10 EDT    Please note that portions of this note were completed with a voice recognition  program.  Copied text in this note has been reviewed and is accurate as of 10/28/23

## 2023-10-28 NOTE — PATIENT INSTRUCTIONS
1.  Please return to clinic at your next scheduled visit.  Please contact the clinic ([phone]) at least 24 hours prior in the event you need to cancel.  2.  Do no harm to yourself or others.    3.  Avoid alcohol and drugs.    4.  Take all medications as prescribed.  Please contact the clinic with any concerns. If you are in need of medication refills, please call the clinic at [phone].    5. Should you want to get in touch with your provider, ERIK Pacheco, please contact the office ([phone]), and staff will be able to page Janine directly.  6. In the event you have personal crisis, contact the following crisis numbers: Suicide Prevention Hotline 1-140.618.7460; [facility] Helpline 0-571-731-YQOV; [facility] Emergency Room [phone]; text HELLO to 945258; or 598.     SPECIFIC RECOMMENDATIONS:     1.      Medications discussed at this encounter:     No orders of the defined types were placed in this encounter.                      2.      Psychotherapy recommendations: We will continue therapy at future visits.     3.     Return to clinic: Return in about 6 weeks (around 12/4/2023) for Next scheduled follow up.

## 2023-10-30 DIAGNOSIS — M54.41 ACUTE RIGHT-SIDED LOW BACK PAIN WITH RIGHT-SIDED SCIATICA: ICD-10-CM

## 2023-10-30 RX ORDER — CYCLOBENZAPRINE HCL 5 MG
5 TABLET ORAL DAILY PRN
Qty: 30 TABLET | Refills: 0 | Status: SHIPPED | OUTPATIENT
Start: 2023-10-30

## 2023-11-06 ENCOUNTER — OFFICE VISIT (OUTPATIENT)
Dept: INTERNAL MEDICINE | Facility: CLINIC | Age: 43
End: 2023-11-06
Payer: COMMERCIAL

## 2023-11-06 VITALS
TEMPERATURE: 96.8 F | BODY MASS INDEX: 26.38 KG/M2 | HEART RATE: 89 BPM | DIASTOLIC BLOOD PRESSURE: 78 MMHG | OXYGEN SATURATION: 99 % | WEIGHT: 189 LBS | SYSTOLIC BLOOD PRESSURE: 110 MMHG

## 2023-11-06 DIAGNOSIS — N76.0 ACUTE VAGINITIS: ICD-10-CM

## 2023-11-06 DIAGNOSIS — Z00.00 ANNUAL PHYSICAL EXAM: Primary | ICD-10-CM

## 2023-11-06 DIAGNOSIS — Z12.4 SCREENING FOR CERVICAL CANCER: ICD-10-CM

## 2023-11-06 LAB
C TRACH RRNA CVX QL NAA+PROBE: NOT DETECTED
CANDIDA SPECIES: NEGATIVE
GARDNERELLA VAGINALIS: NEGATIVE
N GONORRHOEA RRNA SPEC QL NAA+PROBE: NOT DETECTED
T VAGINALIS DNA VAG QL PROBE+SIG AMP: NEGATIVE

## 2023-11-06 PROCEDURE — 87660 TRICHOMONAS VAGIN DIR PROBE: CPT | Performed by: PHYSICIAN ASSISTANT

## 2023-11-06 PROCEDURE — 87480 CANDIDA DNA DIR PROBE: CPT | Performed by: PHYSICIAN ASSISTANT

## 2023-11-06 PROCEDURE — 87591 N.GONORRHOEAE DNA AMP PROB: CPT | Performed by: PHYSICIAN ASSISTANT

## 2023-11-06 PROCEDURE — 87510 GARDNER VAG DNA DIR PROBE: CPT | Performed by: PHYSICIAN ASSISTANT

## 2023-11-06 PROCEDURE — G0123 SCREEN CERV/VAG THIN LAYER: HCPCS | Performed by: PHYSICIAN ASSISTANT

## 2023-11-06 PROCEDURE — 99396 PREV VISIT EST AGE 40-64: CPT | Performed by: PHYSICIAN ASSISTANT

## 2023-11-06 PROCEDURE — 1159F MED LIST DOCD IN RCRD: CPT | Performed by: PHYSICIAN ASSISTANT

## 2023-11-06 PROCEDURE — 87491 CHLMYD TRACH DNA AMP PROBE: CPT | Performed by: PHYSICIAN ASSISTANT

## 2023-11-06 PROCEDURE — 1160F RVW MEDS BY RX/DR IN RCRD: CPT | Performed by: PHYSICIAN ASSISTANT

## 2023-11-06 NOTE — PROGRESS NOTES
Subjective   History of Present Illness    Cruz Nichols is a 43 y.o. female who presents for annual exam.    Obstetric History:  OB History    No obstetric history on file.        Menstrual History:     No LMP recorded.       Sexual History:  Current contraception: tubal ligation  History of abnormal Pap smear: yes - per pt  Family history of uterine or ovarian cancer: no  Family History of cervical cancer: no  Family History of colon cancer/colon polyps: no  Regular self breast exam: yes  History of abnormal mammogram: no  Family history of breast cancer: no  History of abnormal lipids: no    The following portions of the patient's history were reviewed and updated as appropriate: allergies, current medications, past family history, past medical history, past social history, past surgical history, and problem list.      Objective     Physical Exam  Vitals reviewed.   Constitutional:       Appearance: Normal appearance.   HENT:      Head: Normocephalic and atraumatic.      Nose: Nose normal.      Mouth/Throat:      Mouth: Mucous membranes are moist.   Eyes:      Extraocular Movements: Extraocular movements intact.      Conjunctiva/sclera: Conjunctivae normal.      Pupils: Pupils are equal, round, and reactive to light.   Cardiovascular:      Rate and Rhythm: Normal rate and regular rhythm.   Pulmonary:      Effort: Pulmonary effort is normal.      Breath sounds: Normal breath sounds.   Abdominal:      General: Abdomen is flat. Bowel sounds are normal.      Palpations: Abdomen is soft.   Musculoskeletal:         General: Normal range of motion.   Neurological:      General: No focal deficit present.      Mental Status: She is alert and oriented to person, place, and time.   Psychiatric:         Mood and Affect: Mood normal.           /78 (BP Location: Right arm, Patient Position: Sitting, Cuff Size: Adult)   Pulse 89   Temp 96.8 °F (36 °C) (Temporal)   Wt 85.7 kg (189 lb)   SpO2 99%   BMI 26.38  kg/m²       Assessment & Plan   Diagnoses and all orders for this visit:    1. Annual physical exam (Primary)  Assessment & Plan:  Reviewed preventative medication recommendations that are age appropriate for the patient. Education provided for health and wellness. Encouraged healthy diet, regular exercise, and routine wellness checkups.        2. Screening for cervical cancer  -     IGP,rfx Aptima HPV All Pth; Future  -     IGP,rfx Aptima HPV All Pth    3. Acute vaginitis  Comments:  Will tx based on results. Pt would like std testing.  Orders:  -     Gardnerella vaginalis, Trichomonas vaginalis, Candida albicans, DNA - Swab, Cervix  -     Chlamydia trachomatis, Neisseria gonorrhoeae, PCR - , Cervix; Future  -     Chlamydia trachomatis, Neisseria gonorrhoeae, PCR - , Cervix        Await pap smear results.

## 2023-11-13 LAB
CONV .: NORMAL
CYTOLOGIST CVX/VAG CYTO: NORMAL
CYTOLOGY CVX/VAG DOC CYTO: NORMAL
CYTOLOGY CVX/VAG DOC THIN PREP: NORMAL
DX ICD CODE: NORMAL
HIV 1 & 2 AB SER-IMP: NORMAL
OTHER STN SPEC: NORMAL
PATHOLOGIST CVX/VAG CYTO: NORMAL
STAT OF ADQ CVX/VAG CYTO-IMP: NORMAL

## 2023-11-27 DIAGNOSIS — F31.32 BIPOLAR DISORDER WITH MODERATE DEPRESSION: ICD-10-CM

## 2023-11-27 RX ORDER — LURASIDONE HYDROCHLORIDE 40 MG/1
TABLET, FILM COATED ORAL
Qty: 30 TABLET | Refills: 1 | Status: SHIPPED | OUTPATIENT
Start: 2023-11-27

## 2023-11-27 RX ORDER — NAPROXEN 500 MG/1
TABLET ORAL
Qty: 60 TABLET | Refills: 1 | Status: SHIPPED | OUTPATIENT
Start: 2023-11-27

## 2023-12-01 DIAGNOSIS — M54.41 ACUTE RIGHT-SIDED LOW BACK PAIN WITH RIGHT-SIDED SCIATICA: ICD-10-CM

## 2023-12-01 RX ORDER — CYCLOBENZAPRINE HCL 5 MG
5 TABLET ORAL DAILY PRN
Qty: 30 TABLET | Refills: 0 | Status: SHIPPED | OUTPATIENT
Start: 2023-12-01

## 2023-12-04 ENCOUNTER — OFFICE VISIT (OUTPATIENT)
Dept: BEHAVIORAL HEALTH | Facility: CLINIC | Age: 43
End: 2023-12-04
Payer: COMMERCIAL

## 2023-12-04 VITALS
WEIGHT: 190.38 LBS | DIASTOLIC BLOOD PRESSURE: 80 MMHG | BODY MASS INDEX: 26.65 KG/M2 | SYSTOLIC BLOOD PRESSURE: 115 MMHG | OXYGEN SATURATION: 100 % | HEIGHT: 71 IN | HEART RATE: 81 BPM

## 2023-12-04 DIAGNOSIS — F31.32 BIPOLAR DISORDER WITH MODERATE DEPRESSION: Primary | ICD-10-CM

## 2023-12-04 DIAGNOSIS — G47.00 INSOMNIA, UNSPECIFIED TYPE: ICD-10-CM

## 2023-12-04 DIAGNOSIS — F41.1 GENERALIZED ANXIETY DISORDER: ICD-10-CM

## 2023-12-04 DIAGNOSIS — F43.10 POST TRAUMATIC STRESS DISORDER (PTSD): ICD-10-CM

## 2023-12-04 DIAGNOSIS — F60.3 BORDERLINE PERSONALITY DISORDER: ICD-10-CM

## 2023-12-04 PROCEDURE — 1159F MED LIST DOCD IN RCRD: CPT

## 2023-12-04 PROCEDURE — 90836 PSYTX W PT W E/M 45 MIN: CPT

## 2023-12-04 PROCEDURE — 99214 OFFICE O/P EST MOD 30 MIN: CPT

## 2023-12-04 PROCEDURE — 1160F RVW MEDS BY RX/DR IN RCRD: CPT

## 2023-12-04 NOTE — PROGRESS NOTES
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 effects.   Patient is agreeable to call 911 or go to the nearest ER should he/she begin having SI/HI.   Addressed all questions and concerns.    Continue psychotherapeutic modalities as indicated.    TREATMENT PLAN/GOALS:  Treatment plan: Continue supportive psychotherapy efforts and medications as indicated. Continue to challenge patterns of living conducive to pathology, strengthen defenses, promote problems solving, restore adaptive functioning and provide symptom relief. Treatment and medication options discussed during today's visit. Patient acknowledged and verbally consented to continue with current treatment plan and was educated on the importance of compliance with treatment and follow-up appointments.  Functional status:Good  Prognosis: Good  Progress: Continued improvement    Safety: No acute safety concerns.   Psychotherapy:      40 minutes of supportive psychotherapy with goal to strengthen defenses, promote problems solving, restore adaptive functioning and provide symptom relief. The therapeutic alliance was strengthened to encourage the patient to express their thoughts and feelings. Esteem building was enhanced through praise, reassurance, normalizing and encouragement. Coping skills were enhanced to  build distress tolerance skills and emotional regulation. Allowed patient to freely discuss issues without interruption or judgement with unconditional positive regard, active listening skills, and empathy. Provided a safe, confidential environment to facilitate the development of a positive therapeutic relationship and encourage open, honest communication. Assisted patient in identifying risk factors which would indicate the need for higher level of care including thoughts to harm self or others and/or self-harming behavior and encouraged patient to contact this office, call 911, or present to the nearest emergency room should any of these events occur. Assisted patient in processing session content; acknowledged and normalized patient’s thoughts, feelings, and concerns by utilizing a person-centered approach in efforts to build appropriate rapport and a positive therapeutic relationship with open and honest communication. Patient given education on medication side effects, diagnosis/illness and relapse symptoms. Plan to continue supportive psychotherapy in next appointment to provide symptom relief.      Risk Assessment: Risk of self-harm acutely and chronically is moderate to severe.    Risk factors include anxiety disorder, mood disorder, and recent psychosocial stressors.   Protective factors include no family history, denies access to guns/weapons, no present SI, no history of suicide attempts or self-harm in the past, minimal AODA, healthcare seeking, future orientation, willingness to engage in care.    Risk assessment could be further elevated in the event of treatment noncompliance and/or AODA.  Labs/studies: No labs/studies ordered at this time  Medications: No orders of the defined types were placed in this encounter.     Medication Education:   LATUDA (LURASIDONE) Take with food. Risks, benefits, and alternatives discussed with patient including akathisia, sedation, dizziness/falls risk, nausea, low  risk of weight gain & metabolic risks for diabetes and dyslipidemia, and rare tardive dyskinesia.  After discussion of these risks and benefits, the patient voiced understanding and agreed to proceed. Instructed to take medication with meal of minimum of 350 calories to improve consistent efficacy and absorption.   LAMICTAL (LAMOTRIGINE) Risks, benefits, alternatives discussed with patient including rash, rebound depressive or manic symptoms if prompt discontinuation, GI upset, agitation, sedation/falls risk.  After discussion of these risks and benefits, patient voiced understanding and agreed to proceed.    JENI reviewed as expected.  Follow-up: Return in about 1 month (around 1/4/2024) for Next scheduled follow up.         This document has been electronically signed by ERIK Pacheco  December 4, 2023 20:19 EST    Please note that portions of this note were completed with a voice recognition program.  Copied text in this note has been reviewed and is accurate as of 12/04/23

## 2023-12-05 NOTE — PATIENT INSTRUCTIONS
1.  Please return to clinic at your next scheduled visit.  Please contact the clinic ([phone]) at least 24 hours prior in the event you need to cancel.  2.  Do no harm to yourself or others.    3.  Avoid alcohol and drugs.    4.  Take all medications as prescribed.  Please contact the clinic with any concerns. If you are in need of medication refills, please call the clinic at [phone].    5. Should you want to get in touch with your provider, ERIK Pacheco, please contact the office ([phone]), and staff will be able to page Janine directly.  6. In the event you have personal crisis, contact the following crisis numbers: Suicide Prevention Hotline 1-406.557.9254; [facility]ICK[facility] Helpline 7-707-596-NPPX; [facility] Emergency Room [phone]; text HELLO to 087899; or 783.     SPECIFIC RECOMMENDATIONS:     1.      Medications discussed at this encounter:     No orders of the defined types were placed in this encounter.                      2.      Psychotherapy recommendations: We will continue therapy at future visits.     3.     Return to clinic: Return in about 1 month (around 1/4/2024) for Next scheduled follow up.

## 2023-12-13 ENCOUNTER — APPOINTMENT (OUTPATIENT)
Dept: CT IMAGING | Facility: HOSPITAL | Age: 43
End: 2023-12-13
Payer: COMMERCIAL

## 2023-12-13 ENCOUNTER — HOSPITAL ENCOUNTER (EMERGENCY)
Facility: HOSPITAL | Age: 43
Discharge: HOME | End: 2023-12-14
Attending: EMERGENCY MEDICINE
Payer: COMMERCIAL

## 2023-12-13 DIAGNOSIS — N30.00 ACUTE CYSTITIS WITHOUT HEMATURIA: Primary | ICD-10-CM

## 2023-12-13 LAB
ALBUMIN SERPL-MCNC: 4.6 G/DL (ref 3.5–5.2)
ALBUMIN/GLOB SERPL: 1.6 G/DL
ALP SERPL-CCNC: 59 U/L (ref 39–117)
ALT SERPL W P-5'-P-CCNC: 14 U/L (ref 1–33)
ANION GAP SERPL CALCULATED.3IONS-SCNC: 9.2 MMOL/L (ref 5–15)
AST SERPL-CCNC: 18 U/L (ref 1–32)
BACTERIA UR QL AUTO: ABNORMAL /HPF
BASOPHILS # BLD AUTO: 0.03 10*3/MM3 (ref 0–0.2)
BASOPHILS NFR BLD AUTO: 0.4 % (ref 0–1.5)
BILIRUB SERPL-MCNC: 0.2 MG/DL (ref 0–1.2)
BILIRUB UR QL STRIP: NEGATIVE
BUN SERPL-MCNC: 12 MG/DL (ref 6–20)
BUN/CREAT SERPL: 17.6 (ref 7–25)
CALCIUM SPEC-SCNC: 9.5 MG/DL (ref 8.6–10.5)
CHLORIDE SERPL-SCNC: 102 MMOL/L (ref 98–107)
CLARITY UR: ABNORMAL
CO2 SERPL-SCNC: 24.8 MMOL/L (ref 22–29)
COLOR UR: YELLOW
CREAT SERPL-MCNC: 0.68 MG/DL (ref 0.57–1)
DEPRECATED RDW RBC AUTO: 41.1 FL (ref 37–54)
EGFRCR SERPLBLD CKD-EPI 2021: 110.3 ML/MIN/1.73
EOSINOPHIL # BLD AUTO: 0.08 10*3/MM3 (ref 0–0.4)
EOSINOPHIL NFR BLD AUTO: 1 % (ref 0.3–6.2)
ERYTHROCYTE [DISTWIDTH] IN BLOOD BY AUTOMATED COUNT: 12.3 % (ref 12.3–15.4)
GLOBULIN UR ELPH-MCNC: 2.9 GM/DL
GLUCOSE SERPL-MCNC: 87 MG/DL (ref 65–99)
GLUCOSE UR STRIP-MCNC: NEGATIVE MG/DL
HCG INTACT+B SERPL-ACNC: <0.5 MIU/ML
HCT VFR BLD AUTO: 39.6 % (ref 34–46.6)
HGB BLD-MCNC: 12.9 G/DL (ref 12–15.9)
HGB UR QL STRIP.AUTO: ABNORMAL
HOLD SPECIMEN: NORMAL
HOLD SPECIMEN: NORMAL
HYALINE CASTS UR QL AUTO: ABNORMAL /LPF
IMM GRANULOCYTES # BLD AUTO: 0.01 10*3/MM3 (ref 0–0.05)
IMM GRANULOCYTES NFR BLD AUTO: 0.1 % (ref 0–0.5)
KETONES UR QL STRIP: NEGATIVE
LEUKOCYTE ESTERASE UR QL STRIP.AUTO: ABNORMAL
LIPASE SERPL-CCNC: 36 U/L (ref 13–60)
LYMPHOCYTES # BLD AUTO: 2.75 10*3/MM3 (ref 0.7–3.1)
LYMPHOCYTES NFR BLD AUTO: 33.3 % (ref 19.6–45.3)
MCH RBC QN AUTO: 29.7 PG (ref 26.6–33)
MCHC RBC AUTO-ENTMCNC: 32.6 G/DL (ref 31.5–35.7)
MCV RBC AUTO: 91.2 FL (ref 79–97)
MONOCYTES # BLD AUTO: 0.56 10*3/MM3 (ref 0.1–0.9)
MONOCYTES NFR BLD AUTO: 6.8 % (ref 5–12)
NEUTROPHILS NFR BLD AUTO: 4.83 10*3/MM3 (ref 1.7–7)
NEUTROPHILS NFR BLD AUTO: 58.4 % (ref 42.7–76)
NITRITE UR QL STRIP: NEGATIVE
NRBC BLD AUTO-RTO: 0 /100 WBC (ref 0–0.2)
PH UR STRIP.AUTO: 6 [PH] (ref 5–8)
PLATELET # BLD AUTO: 285 10*3/MM3 (ref 140–450)
PMV BLD AUTO: 10.1 FL (ref 6–12)
POTASSIUM SERPL-SCNC: 4 MMOL/L (ref 3.5–5.2)
PROT SERPL-MCNC: 7.5 G/DL (ref 6–8.5)
PROT UR QL STRIP: ABNORMAL
RBC # BLD AUTO: 4.34 10*6/MM3 (ref 3.77–5.28)
RBC # UR STRIP: ABNORMAL /HPF
REF LAB TEST METHOD: ABNORMAL
SODIUM SERPL-SCNC: 136 MMOL/L (ref 136–145)
SP GR UR STRIP: 1.03 (ref 1–1.03)
SQUAMOUS #/AREA URNS HPF: ABNORMAL /HPF
UROBILINOGEN UR QL STRIP: ABNORMAL
WBC # UR STRIP: ABNORMAL /HPF
WBC NRBC COR # BLD AUTO: 8.26 10*3/MM3 (ref 3.4–10.8)
WHOLE BLOOD HOLD COAG: NORMAL
WHOLE BLOOD HOLD SPECIMEN: NORMAL

## 2023-12-13 PROCEDURE — 25810000003 SODIUM CHLORIDE 0.9 % SOLUTION

## 2023-12-13 PROCEDURE — 80053 COMPREHEN METABOLIC PANEL: CPT

## 2023-12-13 PROCEDURE — 74177 CT ABD & PELVIS W/CONTRAST: CPT

## 2023-12-13 PROCEDURE — 83690 ASSAY OF LIPASE: CPT

## 2023-12-13 PROCEDURE — 85025 COMPLETE CBC W/AUTO DIFF WBC: CPT

## 2023-12-13 PROCEDURE — 87086 URINE CULTURE/COLONY COUNT: CPT

## 2023-12-13 PROCEDURE — 36415 COLL VENOUS BLD VENIPUNCTURE: CPT

## 2023-12-13 PROCEDURE — 25010000002 CEFTRIAXONE PER 250 MG

## 2023-12-13 PROCEDURE — 99285 EMERGENCY DEPT VISIT HI MDM: CPT

## 2023-12-13 PROCEDURE — 25510000001 IOPAMIDOL PER 1 ML: Performed by: EMERGENCY MEDICINE

## 2023-12-13 PROCEDURE — 81001 URINALYSIS AUTO W/SCOPE: CPT | Performed by: EMERGENCY MEDICINE

## 2023-12-13 PROCEDURE — 96365 THER/PROPH/DIAG IV INF INIT: CPT

## 2023-12-13 PROCEDURE — 84702 CHORIONIC GONADOTROPIN TEST: CPT

## 2023-12-13 RX ORDER — SODIUM CHLORIDE 0.9 % (FLUSH) 0.9 %
10 SYRINGE (ML) INJECTION AS NEEDED
Status: DISCONTINUED | OUTPATIENT
Start: 2023-12-13 | End: 2023-12-14 | Stop reason: HOSPADM

## 2023-12-13 RX ORDER — CEFTRIAXONE SODIUM 1 G/50ML
1000 INJECTION, SOLUTION INTRAVENOUS ONCE
Status: COMPLETED | OUTPATIENT
Start: 2023-12-14 | End: 2023-12-14

## 2023-12-13 RX ADMIN — CEFTRIAXONE SODIUM 1000 MG: 1 INJECTION, SOLUTION INTRAVENOUS at 23:56

## 2023-12-13 RX ADMIN — SODIUM CHLORIDE 1000 ML: 9 INJECTION, SOLUTION INTRAVENOUS at 23:56

## 2023-12-13 RX ADMIN — IOPAMIDOL 93 ML: 755 INJECTION, SOLUTION INTRAVENOUS at 23:26

## 2023-12-13 NOTE — Clinical Note
[facility] EMERGENCY ROOM  [address]  [place] [address]  Phone: [phone]    Cruz Nichols was seen and treated in our emergency department on 12/13/2023.  She may return to work on 12/15/2023.         Thank you for choosing [facility].    Anshu Thompson MD

## 2023-12-14 VITALS
HEIGHT: 72 IN | BODY MASS INDEX: 25.89 KG/M2 | WEIGHT: 191.14 LBS | SYSTOLIC BLOOD PRESSURE: 120 MMHG | HEART RATE: 97 BPM | OXYGEN SATURATION: 99 % | DIASTOLIC BLOOD PRESSURE: 87 MMHG | RESPIRATION RATE: 17 BRPM | TEMPERATURE: 98.5 F

## 2023-12-14 RX ORDER — PHENAZOPYRIDINE HYDROCHLORIDE 200 MG/1
200 TABLET, FILM COATED ORAL 3 TIMES DAILY PRN
Qty: 6 TABLET | Refills: 0 | Status: SHIPPED | OUTPATIENT
Start: 2023-12-14 | End: 2023-12-16

## 2023-12-14 RX ORDER — CEPHALEXIN 500 MG/1
500 CAPSULE ORAL 2 TIMES DAILY
Qty: 14 CAPSULE | Refills: 0 | Status: SHIPPED | OUTPATIENT
Start: 2023-12-14 | End: 2023-12-21
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
## 2023-12-15 LAB — BACTERIA SPEC AEROBE CULT: NORMAL

## 2024-01-08 DIAGNOSIS — M54.41 ACUTE RIGHT-SIDED LOW BACK PAIN WITH RIGHT-SIDED SCIATICA: ICD-10-CM

## 2024-01-08 RX ORDER — CYCLOBENZAPRINE HCL 5 MG
5 TABLET ORAL DAILY PRN
Qty: 30 TABLET | Refills: 0 | Status: SHIPPED | OUTPATIENT
Start: 2024-01-08

## 2024-01-22 ENCOUNTER — OFFICE VISIT (OUTPATIENT)
Dept: BEHAVIORAL HEALTH | Facility: CLINIC | Age: 44
End: 2024-01-22
Payer: COMMERCIAL

## 2024-01-22 VITALS
WEIGHT: 189.06 LBS | HEART RATE: 88 BPM | HEIGHT: 72 IN | BODY MASS INDEX: 25.61 KG/M2 | SYSTOLIC BLOOD PRESSURE: 111 MMHG | DIASTOLIC BLOOD PRESSURE: 73 MMHG | OXYGEN SATURATION: 98 %

## 2024-01-22 DIAGNOSIS — F31.32 BIPOLAR DISORDER WITH MODERATE DEPRESSION: Primary | ICD-10-CM

## 2024-01-22 DIAGNOSIS — F43.10 POST TRAUMATIC STRESS DISORDER (PTSD): ICD-10-CM

## 2024-01-22 DIAGNOSIS — F60.3 BORDERLINE PERSONALITY DISORDER: ICD-10-CM

## 2024-01-22 DIAGNOSIS — F41.1 GENERALIZED ANXIETY DISORDER: ICD-10-CM

## 2024-01-22 DIAGNOSIS — G47.00 INSOMNIA, UNSPECIFIED TYPE: ICD-10-CM

## 2024-01-22 PROCEDURE — 99214 OFFICE O/P EST MOD 30 MIN: CPT

## 2024-01-22 PROCEDURE — 90836 PSYTX W PT W E/M 45 MIN: CPT

## 2024-01-22 PROCEDURE — 1159F MED LIST DOCD IN RCRD: CPT

## 2024-01-22 PROCEDURE — 1160F RVW MEDS BY RX/DR IN RCRD: CPT

## 2024-01-22 RX ORDER — NAPROXEN 500 MG/1
TABLET ORAL
Qty: 60 TABLET | Refills: 1 | Status: SHIPPED | OUTPATIENT
Start: 2024-01-22

## 2024-01-22 NOTE — PROGRESS NOTES
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         Latest Ref Rng & Units 1/17/2023 10/5/2020   Pain Management Panel   Amphetamine, Urine Qual NA - NEGATIVE    Barbiturates Screen, Urine Negative Negative  NEGATIVE    Benzodiazepine Screen, Urine Negative Negative  NEGATIVE    Cocaine Screen, Urine Negative Negative  NEGATIVE    Methadone Screen , Urine Negative Negative  NEGATIVE       Imaging Results:  CT Abdomen Pelvis With Contrast    Result Date: 12/14/2023    No significant acute findings are seen in the abdomen or pelvis by enhanced CT examination.   Please note that portions of this note were completed with a voice recognition program.  JIM CARRION JR, MD       Electronically Signed and Approved By: JIM CARRION JR, MD on 12/14/2023 at 1:44             Current Medications:   Current Outpatient Medications   Medication Sig Dispense Refill    Acetaminophen (TYLENOL 8 HOUR PO) Take  by mouth. AS NEEDED      cyclobenzaprine (FLEXERIL) 5 MG tablet TAKE 1 TABLET BY MOUTH DAILY AS NEEDED FOR MUSCLE SPASMS. 30 tablet 0    lamoTRIgine (LaMICtal) 100 MG tablet TAKE 1 TABLET BY MOUTH EVERY DAY 90 tablet 1    Methylcobalamin (B12-ACTIVE PO) Take  by mouth. OTC GUMMIES + TABLETS      multivitamin (THERAGRAN) tablet tablet Take  by mouth Daily.      naproxen (NAPROSYN) 500 MG tablet TAKE 1 TABLET BY MOUTH TWICE A DAY WITH  FOOD 60 tablet 1    solifenacin (VESICARE) 5 MG tablet Take 1 tablet by mouth Daily. 90 tablet 4    lurasidone (LATUDA) 40 MG tablet tablet TAKE 1 TABLET BY MOUTH EVERY DAY 30 tablet 1     No current facility-administered medications for this visit.     Problem List:  Patient Active Problem List   Diagnosis    Major depressive disorder, recurrent episode, moderate degree    PTSD (post-traumatic stress disorder)    Borderline personality disorder    Allergic rhinitis    Depression    Migraine without aura    Urge incontinence    Urinary incontinence    Annual physical exam    Bladder spasm    Body mass index (BMI) of 25.0-25.9 in adult    Routine health maintenance    Balance disorder    Lightheaded    Anxiety    Shingles     Allergy:   Allergies   Allergen Reactions    Codeine Seizure     Other reaction(s): Seizure    Prednisone Anaphylaxis      Discontinued Medications:  There are no discontinued medications.      PLAN:   Presentation seems most consistent with DSM-V criteria for:  Diagnoses and all orders for this visit:    1. Bipolar disorder with moderate depression (Primary)    2. Generalized anxiety disorder    3. Post traumatic stress disorder (PTSD)    4. Borderline personality disorder    5. Insomnia, unspecified type       Continue Latuda 40 mg daily  Continue Lamictal 100 mg daily  Follow-up 6 weeks  Medication Education:   LATUDA (LURASIDONE) Take with food. Risks, benefits, and alternatives discussed with patient including akathisia, sedation, dizziness/falls risk, nausea, low risk of weight gain & metabolic risks for diabetes and dyslipidemia, and rare tardive dyskinesia.  After discussion of these risks and benefits, the patient voiced understanding and agreed to proceed. Instructed to take medication with meal of minimum of 350 calories to improve consistent efficacy and absorption.   LAMICTAL (LAMOTRIGINE) Risks, benefits, alternatives discussed with patient including rash, rebound depressive or  manic symptoms if prompt discontinuation, GI upset, agitation, sedation/falls risk.  After discussion of these risks and benefits, patient voiced understanding and agreed to proceed.  Medications: No orders of the defined types were placed in this encounter.     JENI reviewed.   Discussed medication options and treatment plan of prescribed medication as well as the risks, benefits, and side effects.  Patient verbalized understanding and is agreeable to this plan.   Patient is agreeable to call the office with any worsening of symptoms or onset of side effects.   Patient is agreeable to call 911 or go to the nearest ER should he/she begin having SI/HI.   Continue psychotherapeutic modalities as indicated.  Continue to challenge patterns of living conducive to pathology, strengthen defenses, promote problems solving, restore adaptive functioning and provide symptom relief.   Patient acknowledged and verbally consented to continue with current treatment plan and was educated on the importance of compliance with treatment and follow-up appointments.  Addressed all questions and concerns.     Psychotherapy:      40 minutes of supportive psychotherapy with goal to strengthen defenses, promote problems solving, restore adaptive functioning and provide symptom relief. The therapeutic alliance was strengthened to encourage the patient to express their thoughts and feelings. Esteem building was enhanced through praise, reassurance, normalizing and encouragement. Coping skills were enhanced to build distress tolerance skills and emotional regulation. Allowed patient to freely discuss issues without interruption or judgement with unconditional positive regard, active listening skills, and empathy. Provided a safe, confidential environment to facilitate the development of a positive therapeutic relationship and encourage open, honest communication. Assisted patient in identifying risk factors which would indicate the need for  higher level of care including thoughts to harm self or others and/or self-harming behavior and encouraged patient to contact this office, call 911, or present to the nearest emergency room should any of these events occur. Assisted patient in processing session content; acknowledged and normalized patient’s thoughts, feelings, and concerns by utilizing a person-centered approach in efforts to build appropriate rapport and a positive therapeutic relationship with open and honest communication. Patient given education on medication side effects, diagnosis/illness and relapse symptoms. Plan to continue supportive psychotherapy in next appointment to provide symptom relief.      Functional status:Good  Prognosis: Good  Progress: Continued improvement    Safety/Risk Assessment: Risk of self-harm acutely and chronically is moderate.    Risk factors include anxiety disorder, mood disorder, and recent psychosocial stressors.   Protective factors include no family history, denies access to guns/weapons, no present SI, minimal AODA, healthcare seeking, future orientation, willingness to engage in care.    Risk assessment could be further elevated in the event of treatment noncompliance and/or AODA.    Follow-up: Return in about 6 weeks (around 3/4/2024) for Next scheduled follow up.         This document has been electronically signed by ERIK Pacheco  January 31, 2024 15:52 EST    Please note that portions of this note were completed with a voice recognition program.  Copied text in this note has been reviewed and is accurate as of 01/31/24

## 2024-01-23 DIAGNOSIS — F31.32 BIPOLAR DISORDER WITH MODERATE DEPRESSION: ICD-10-CM

## 2024-01-23 RX ORDER — LURASIDONE HYDROCHLORIDE 40 MG/1
TABLET, FILM COATED ORAL
Qty: 30 TABLET | Refills: 1 | Status: SHIPPED | OUTPATIENT
Start: 2024-01-23

## 2024-01-23 NOTE — TELEPHONE ENCOUNTER
MEDICATION REFILL REQUEST    lurasidone (LATUDA) 40 MG tablet tablet (11/27/2023)     LOV: Office Visit with Janine Hummel APRN (01/22/2024)     NEXT FOLLOW UP: Appointment with Janine Hummel APRN (03/11/2024)     MEDICATION ORDER PENDING

## 2024-01-31 NOTE — PATIENT INSTRUCTIONS
1.  Please return to clinic at your next scheduled visit.  Please contact the clinic ([phone]) at least 24 hours prior in the event you need to cancel.  2.  Do no harm to yourself or others.    3.  Avoid alcohol and drugs.    4.  Take all medications as prescribed.  Please contact the clinic with any concerns. If you are in need of medication refills, please call the clinic at [phone].    5. Should you want to get in touch with your provider, ERIK Pacheco, please contact the office ([phone]), and staff will be able to page Janine directly.  6. In the event you have personal crisis, contact the following crisis numbers: Suicide Prevention Hotline 1-941.561.5812; [facility] Helpline 5-784-694-ALLQ; [facility] Emergency Room [phone]; text HELLO to 201262; or 489.     SPECIFIC RECOMMENDATIONS:     1.      Medications discussed at this encounter:     No orders of the defined types were placed in this encounter.                      2.      Psychotherapy recommendations: We will continue therapy at future visits.     3.     Return to clinic: Return in about 6 weeks (around 3/4/2024) for Next scheduled follow up.

## 2024-02-27 ENCOUNTER — OFFICE VISIT (OUTPATIENT)
Dept: UROLOGY | Facility: CLINIC | Age: 44
End: 2024-02-27
Payer: COMMERCIAL

## 2024-02-27 VITALS
DIASTOLIC BLOOD PRESSURE: 81 MMHG | RESPIRATION RATE: 12 BRPM | HEART RATE: 77 BPM | WEIGHT: 181 LBS | BODY MASS INDEX: 24.52 KG/M2 | SYSTOLIC BLOOD PRESSURE: 117 MMHG | HEIGHT: 72 IN

## 2024-02-27 DIAGNOSIS — N39.41 URGE INCONTINENCE: ICD-10-CM

## 2024-02-27 DIAGNOSIS — R30.0 DYSURIA: Primary | ICD-10-CM

## 2024-02-27 PROBLEM — M54.16 LUMBAR RADICULOPATHY: Status: ACTIVE | Noted: 2023-10-24

## 2024-02-27 PROBLEM — M47.816 LUMBAR SPONDYLOSIS: Status: ACTIVE | Noted: 2023-10-24

## 2024-02-27 PROBLEM — E66.3 OVERWEIGHT: Status: ACTIVE | Noted: 2023-10-24

## 2024-02-27 PROBLEM — M51.36 DEGENERATION OF LUMBAR INTERVERTEBRAL DISC: Status: ACTIVE | Noted: 2023-10-24

## 2024-02-27 PROBLEM — M51.369 DEGENERATION OF LUMBAR INTERVERTEBRAL DISC: Status: ACTIVE | Noted: 2023-10-24

## 2024-02-27 LAB
BILIRUB BLD-MCNC: NEGATIVE MG/DL
CLARITY, POC: CLEAR
COLOR UR: YELLOW
EXPIRATION DATE: ABNORMAL
GLUCOSE UR STRIP-MCNC: NEGATIVE MG/DL
KETONES UR QL: NEGATIVE
LEUKOCYTE EST, POC: ABNORMAL
Lab: ABNORMAL
NITRITE UR-MCNC: NEGATIVE MG/ML
PH UR: 7 [PH] (ref 5–8)
PROT UR STRIP-MCNC: NEGATIVE MG/DL
RBC # UR STRIP: NEGATIVE /UL
SP GR UR: 1.03 (ref 1–1.03)
URINE VOLUME: 0
UROBILINOGEN UR QL: ABNORMAL

## 2024-02-27 PROCEDURE — 1160F RVW MEDS BY RX/DR IN RCRD: CPT | Performed by: NURSE PRACTITIONER

## 2024-02-27 PROCEDURE — 1159F MED LIST DOCD IN RCRD: CPT | Performed by: NURSE PRACTITIONER

## 2024-02-27 PROCEDURE — 51798 US URINE CAPACITY MEASURE: CPT | Performed by: NURSE PRACTITIONER

## 2024-02-27 PROCEDURE — 99213 OFFICE O/P EST LOW 20 MIN: CPT | Performed by: NURSE PRACTITIONER

## 2024-02-27 RX ORDER — SOLIFENACIN SUCCINATE 5 MG/1
5 TABLET, FILM COATED ORAL DAILY
Qty: 90 TABLET | Refills: 4 | Status: SHIPPED | OUTPATIENT
Start: 2024-02-27
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
## 2024-03-11 ENCOUNTER — OFFICE VISIT (OUTPATIENT)
Dept: BEHAVIORAL HEALTH | Facility: CLINIC | Age: 44
End: 2024-03-11
Payer: COMMERCIAL

## 2024-03-11 VITALS
SYSTOLIC BLOOD PRESSURE: 118 MMHG | OXYGEN SATURATION: 98 % | BODY MASS INDEX: 25.64 KG/M2 | DIASTOLIC BLOOD PRESSURE: 81 MMHG | WEIGHT: 189.31 LBS | HEIGHT: 72 IN | HEART RATE: 88 BPM

## 2024-03-11 DIAGNOSIS — G47.00 INSOMNIA, UNSPECIFIED TYPE: ICD-10-CM

## 2024-03-11 DIAGNOSIS — F31.32 BIPOLAR DISORDER WITH MODERATE DEPRESSION: Primary | ICD-10-CM

## 2024-03-11 DIAGNOSIS — F43.10 POST TRAUMATIC STRESS DISORDER (PTSD): ICD-10-CM

## 2024-03-11 DIAGNOSIS — F60.3 BORDERLINE PERSONALITY DISORDER: ICD-10-CM

## 2024-03-11 DIAGNOSIS — F41.1 GENERALIZED ANXIETY DISORDER: ICD-10-CM

## 2024-03-11 PROCEDURE — 99214 OFFICE O/P EST MOD 30 MIN: CPT

## 2024-03-11 PROCEDURE — 90836 PSYTX W PT W E/M 45 MIN: CPT

## 2024-03-11 PROCEDURE — 1159F MED LIST DOCD IN RCRD: CPT

## 2024-03-11 PROCEDURE — 1160F RVW MEDS BY RX/DR IN RCRD: CPT

## 2024-03-11 NOTE — PROGRESS NOTES
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 12/14/2023    No significant acute findings are seen in the abdomen or pelvis by enhanced CT examination.   Please note that portions of this note were completed with a voice recognition program.  JIM CARRION JR, MD       Electronically Signed and Approved By: JIM CARRION JR, MD on 12/14/2023 at 1:44             Current Medications:   Current Outpatient Medications   Medication Sig Dispense Refill    Acetaminophen (TYLENOL 8 HOUR PO) Take  by mouth. AS NEEDED      lamoTRIgine (LaMICtal) 100 MG tablet TAKE 1 TABLET BY MOUTH EVERY DAY 90 tablet 1    lurasidone (LATUDA) 40 MG tablet tablet TAKE 1 TABLET BY MOUTH EVERY DAY 30 tablet 1    Methylcobalamin (B12-ACTIVE PO) Take  by mouth. OTC GUMMIES + TABLETS      multivitamin (THERAGRAN) tablet tablet Take  by mouth Daily.      solifenacin (VESICARE) 5 MG tablet Take 1 tablet by mouth Daily. 90 tablet 4    cyclobenzaprine (FLEXERIL) 5 MG tablet TAKE 1 TABLET BY MOUTH DAILY AS NEEDED FOR MUSCLE SPASMS. 30 tablet 0    naproxen (NAPROSYN) 500 MG tablet TAKE 1 TABLET BY MOUTH TWICE A DAY WITH FOOD 60 tablet 1     No current facility-administered medications for this visit.     Problem List:  Patient Active Problem List   Diagnosis    Major depressive disorder, recurrent episode, moderate degree    PTSD (post-traumatic stress disorder)     Borderline personality disorder    Allergic rhinitis    Depression    Migraine without aura    Urge incontinence    Urinary incontinence    Annual physical exam    Bladder spasm    Body mass index (BMI) of 25.0-25.9 in adult    Routine health maintenance    Balance disorder    Lightheaded    Anxiety    Shingles    Degeneration of lumbar intervertebral disc    Lumbar radiculopathy    Lumbar spondylosis    Overweight     Allergy:   Allergies   Allergen Reactions    Codeine Seizure     Other reaction(s): Seizure    Prednisone Anaphylaxis      Discontinued Medications:  There are no discontinued medications.      PLAN:   Presentation seems most consistent with DSM-V criteria for:  Diagnoses and all orders for this visit:    1. Bipolar disorder with moderate depression (Primary)    2. Generalized anxiety disorder    3. Post traumatic stress disorder (PTSD)    4. Borderline personality disorder    5. Insomnia, unspecified type       Continue Latuda 40 mg daily  Continue Lamictal 100 mg daily  Follow-up 6 weeks  Medication Education:   LATUDA (LURASIDONE) Take with food. Risks, benefits, and alternatives discussed with patient including akathisia, sedation, dizziness/falls risk, nausea, low risk of weight gain & metabolic risks for diabetes and dyslipidemia, and rare tardive dyskinesia.  After discussion of these risks and benefits, the patient voiced understanding and agreed to proceed. Instructed to take medication with meal of minimum of 350 calories to improve consistent efficacy and absorption.   LAMICTAL (LAMOTRIGINE) Risks, benefits, alternatives discussed with patient including rash, rebound depressive or manic symptoms if prompt discontinuation, GI upset, agitation, sedation/falls risk.  After discussion of these risks and benefits, patient voiced understanding and agreed to proceed.  Medications: No orders of the defined types were placed in this encounter.     JENI reviewed.   Discussed medication options  and treatment plan of prescribed medication as well as the risks, benefits, and side effects.  Patient verbalized understanding and is agreeable to this plan.   Patient is agreeable to call the office with any worsening of symptoms or onset of side effects.   Patient is agreeable to call 911 or go to the nearest ER should he/she begin having SI/HI.   Continue psychotherapeutic modalities as indicated.  Continue to challenge patterns of living conducive to pathology, strengthen defenses, promote problems solving, restore adaptive functioning and provide symptom relief.   Patient acknowledged and verbally consented to continue with current treatment plan and was educated on the importance of compliance with treatment and follow-up appointments.  Addressed all questions and concerns.     Psychotherapy:      Psychotherapy time 38 minutes.  This time is exclusive to the therapy session and separate from the time spent on activities used to meet the criteria for the E/M service (history, exam, medical decision-making).  Goal is to strengthen defenses, promote problems solving, restore adaptive functioning, and provide symptom relief. The therapeutic alliance was strengthened to encourage the patient to express their thoughts and feelings. Esteem building was enhanced through praise, reassurance, normalizing and encouragement. Coping skills were enhanced to build distress tolerance skills and emotional regulation. Allowed patient to freely discuss issues without interruption or judgement with unconditional positive regard, active listening skills, and empathy. Provided a safe, confidential environment to facilitate the development of a positive therapeutic relationship and encourage open, honest communication. Assisted patient in processing session content, acknowledged and normalized patient’s thoughts, feelings, and concerns by utilizing a person-centered approach in efforts to build appropriate rapport and a positive  therapeutic relationship with open and honest communication. Plan to continue supportive psychotherapy in next appointment to provide symptom relief.      Functional status:Good  Prognosis: Good  Progress: Continued improvement    Safety/Risk Assessment: Risk of self-harm acutely and chronically is moderate.    Risk factors include anxiety disorder, mood disorder, and recent psychosocial stressors.   Protective factors include no family history, denies access to guns/weapons, minimal AODA, healthcare seeking, future orientation, willingness to engage in care.    Risk assessment could be further elevated in the event of treatment noncompliance and/or AODA.    Follow-up: Return in about 6 weeks (around 4/22/2024) for Next scheduled follow up.         This document has been electronically signed by ERIK Pacheco  March 25, 2024 22:37 EDT    Please note that portions of this note were completed with a voice recognition program.  Copied text in this note has been reviewed and is accurate as of 03/25/24

## 2024-03-16 DIAGNOSIS — M54.41 ACUTE RIGHT-SIDED LOW BACK PAIN WITH RIGHT-SIDED SCIATICA: ICD-10-CM
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
## 2024-03-18 RX ORDER — CYCLOBENZAPRINE HCL 5 MG
5 TABLET ORAL DAILY PRN
Qty: 30 TABLET | Refills: 0 | Status: SHIPPED | OUTPATIENT
Start: 2024-03-18

## 2024-03-25 RX ORDER — NAPROXEN 500 MG/1
TABLET ORAL
Qty: 60 TABLET | Refills: 1 | Status: SHIPPED | OUTPATIENT
Start: 2024-03-25

## 2024-04-02 NOTE — PATIENT INSTRUCTIONS
1.  Please return to clinic at your next scheduled visit.  Please contact the clinic ([phone]) at least 24 hours prior in the event you need to cancel.  2.  Do no harm to yourself or others.    3.  Avoid alcohol and drugs.    4.  Take all medications as prescribed.  Please contact the clinic with any concerns. If you are in need of medication refills, please call the clinic at [phone].    5. Should you want to get in touch with your provider, ERIK Pacheco, please contact the office ([phone]), and staff will be able to page Janine directly.  6. In the event you have personal crisis, contact the following crisis numbers: Suicide Prevention Hotline 1-114.482.7395; [facility] Helpline 9-966-864-IACY; [facility] Emergency Room [phone]; text HELLO to 808888; or 237.     SPECIFIC RECOMMENDATIONS:     1.      Medications discussed at this encounter:     No orders of the defined types were placed in this encounter.                      2.      Psychotherapy recommendations: We will continue therapy at future visits.     3.     Return to clinic: Return in about 6 weeks (around 4/22/2024) for Next scheduled follow up.    Patient with h/o DM presents with complaints of palpitations since yesterday and today has dizziness and mild SOB. Denies chest pain, fever, chills, nausea, vomiting.

## 2024-04-14 DIAGNOSIS — F31.32 BIPOLAR DISORDER WITH MODERATE DEPRESSION: ICD-10-CM

## 2024-04-15 RX ORDER — LURASIDONE HYDROCHLORIDE 40 MG/1
TABLET, FILM COATED ORAL
Qty: 30 TABLET | Refills: 1 | Status: SHIPPED | OUTPATIENT
Start: 2024-04-15

## 2024-04-15 NOTE — TELEPHONE ENCOUNTER
REFILL REQUEST     lurasidone (LATUDA) 40 MG tablet tablet (01/23/2024)     LAST OFFICE VISIT-  Office Visit with Janine Hummel APRN (03/11/2024)     NEXT FOLLOW UP WITH PROVIDER-  Appointment with Janine Hummel APRN (05/21/2024)

## 2024-04-24 DIAGNOSIS — F41.1 GENERALIZED ANXIETY DISORDER: ICD-10-CM

## 2024-04-24 DIAGNOSIS — F43.10 POST TRAUMATIC STRESS DISORDER (PTSD): ICD-10-CM

## 2024-04-24 DIAGNOSIS — F60.3 BORDERLINE PERSONALITY DISORDER: ICD-10-CM

## 2024-04-24 RX ORDER — LAMOTRIGINE 100 MG/1
100 TABLET ORAL DAILY
Qty: 90 TABLET | Refills: 1 | Status: SHIPPED | OUTPATIENT
Start: 2024-04-24

## 2024-04-24 NOTE — TELEPHONE ENCOUNTER
REFILL REQUEST FOR LAMOTRIGINE 100 MG TABLETS.    lamoTRIgine (LaMICtal) 100 MG tablet (08/07/2023)     FOLLOW UP APPT ON 05/21/2024.  PT LAST SEEN ON 03/11/2024.

## 2024-05-07 ENCOUNTER — TELEPHONE (OUTPATIENT)
Dept: BEHAVIORAL HEALTH | Facility: CLINIC | Age: 44
End: 2024-05-07
Payer: COMMERCIAL

## 2024-05-10 ENCOUNTER — TRANSCRIBE ORDERS (OUTPATIENT)
Dept: ADMINISTRATIVE | Facility: HOSPITAL | Age: 44
End: 2024-05-10
Payer: COMMERCIAL

## 2024-05-10 DIAGNOSIS — Z12.31 SCREENING MAMMOGRAM FOR BREAST CANCER: Primary | ICD-10-CM

## 2024-05-19 DIAGNOSIS — M54.41 ACUTE RIGHT-SIDED LOW BACK PAIN WITH RIGHT-SIDED SCIATICA: ICD-10-CM

## 2024-05-20 RX ORDER — CYCLOBENZAPRINE HCL 5 MG
5 TABLET ORAL DAILY PRN
Qty: 30 TABLET | Refills: 0 | Status: SHIPPED | OUTPATIENT
Start: 2024-05-20

## 2024-05-21 ENCOUNTER — OFFICE VISIT (OUTPATIENT)
Dept: BEHAVIORAL HEALTH | Facility: CLINIC | Age: 44
End: 2024-05-21
Payer: COMMERCIAL

## 2024-05-21 VITALS
BODY MASS INDEX: 26.88 KG/M2 | SYSTOLIC BLOOD PRESSURE: 118 MMHG | HEIGHT: 71 IN | WEIGHT: 192 LBS | DIASTOLIC BLOOD PRESSURE: 82 MMHG

## 2024-05-21 DIAGNOSIS — F43.10 POST TRAUMATIC STRESS DISORDER (PTSD): ICD-10-CM

## 2024-05-21 DIAGNOSIS — F31.32 BIPOLAR DISORDER WITH MODERATE DEPRESSION: Primary | ICD-10-CM

## 2024-05-21 DIAGNOSIS — F60.3 BORDERLINE PERSONALITY DISORDER: ICD-10-CM

## 2024-05-21 DIAGNOSIS — F41.1 GENERALIZED ANXIETY DISORDER: ICD-10-CM

## 2024-05-21 DIAGNOSIS — G47.00 INSOMNIA, UNSPECIFIED TYPE: ICD-10-CM

## 2024-05-21 PROCEDURE — 1159F MED LIST DOCD IN RCRD: CPT

## 2024-05-21 PROCEDURE — 1160F RVW MEDS BY RX/DR IN RCRD: CPT

## 2024-05-21 PROCEDURE — 99214 OFFICE O/P EST MOD 30 MIN: CPT

## 2024-05-21 PROCEDURE — 90836 PSYTX W PT W E/M 45 MIN: CPT

## 2024-05-21 NOTE — PATIENT INSTRUCTIONS
1.  Please return to clinic at your next scheduled visit.  Please contact the clinic ([phone]) at least 24 hours prior in the event you need to cancel.  2.  Do no harm to yourself or others.    3.  Avoid alcohol and drugs.    4.  Take all medications as prescribed.  Please contact the clinic with any concerns. If you are in need of medication refills, please call the clinic at [phone].    5. Should you want to get in touch with your provider, ERIK Pacheco, please contact the office ([phone]), and staff will be able to page Janine directly.  6. In the event you have personal crisis, contact the following crisis numbers: Suicide Prevention Hotline 1-643.961.8978; [facility] Helpline 6-398-443-CRDL; [facility] Emergency Room [phone]; text HELLO to 583140; or 137.     SPECIFIC RECOMMENDATIONS:     1.      Medications discussed at this encounter:     New Medications Ordered This Visit   Medications    Cariprazine HCl (Vraylar) 1.5 MG capsule capsule     Sig: Take 1 capsule by mouth Daily.     Dispense:  30 capsule     Refill:  1    Cariprazine HCl (Vraylar) 1.5 MG capsule capsule     Sig: Take 1 capsule by mouth Daily.     Dispense:  7 capsule     Refill:  0                       2.      Psychotherapy recommendations: We will continue therapy at future visits.     3.     Return to clinic: Return in about 6 weeks (around 7/2/2024) for Next scheduled follow up.

## 2024-05-21 NOTE — PROGRESS NOTES
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  Attitude:  Cooperative and polite  Motor Activity:  Normal gait, posture  Speech:  Normal rate, tone, volume  Mood and affect:  Normal, pleasant and euthymic  Hopelessness:  Denies  Thought Process:  Logical and goal-directed  Associations/ Thought Content:  No delusions  Hallucinations:  None  Suicidal Ideations:  Not present  Homicidal Ideation:  Not present  Sensorium:  Alert  Orientation:  Person, place, time and situation  Immediate Recall, Recent, and Remote Memory:  Intact  Attention Span/ Concentration:  Good  Fund of Knowledge:  Appropriate for age and educational level  Intellectual Functioning:  Average range  Insight:  Good  Judgement:  Good  Reliability:  Good  Impulse Control:  Good          Review of systems is negative except as noted in HPI.  Labs:  WBC   Date Value Ref Range Status   12/13/2023 8.26 3.40 - 10.80 10*3/mm3 Final     Platelets   Date Value Ref Range Status   12/13/2023 285 140 - 450 10*3/mm3 Final     Hemoglobin   Date Value Ref Range Status   12/13/2023 12.9 12.0 - 15.9 g/dL Final     Hematocrit   Date Value Ref Range Status   12/13/2023 39.6 34.0 - 46.6 % Final     Glucose   Date Value Ref Range Status   12/13/2023 87 65 - 99 mg/dL Final     Creatinine   Date Value Ref Range Status    12/13/2023 0.68 0.57 - 1.00 mg/dL Final     ALT (SGPT)   Date Value Ref Range Status   12/13/2023 14 1 - 33 U/L Final     AST (SGOT)   Date Value Ref Range Status   12/13/2023 18 1 - 32 U/L Final     BUN   Date Value Ref Range Status   12/13/2023 12 6 - 20 mg/dL Final     eGFR   Date Value Ref Range Status   12/13/2023 110.3 >60.0 mL/min/1.73 Final     Total Cholesterol   Date Value Ref Range Status   08/23/2023 174 0 - 200 mg/dL Final     Triglycerides   Date Value Ref Range Status   08/23/2023 95 0 - 150 mg/dL Final     HDL Cholesterol   Date Value Ref Range Status   08/23/2023 58 40 - 60 mg/dL Final     LDL Cholesterol    Date Value Ref Range Status   08/23/2023 99 0 - 100 mg/dL Final     VLDL Cholesterol   Date Value Ref Range Status   08/23/2023 17 5 - 40 mg/dL Final     LDL/HDL Ratio   Date Value Ref Range Status   08/23/2023 1.67  Final     Hemoglobin A1C   Date Value Ref Range Status   10/17/2022 5.50 4.80 - 5.60 % Final     TSH   Date Value Ref Range Status   08/23/2023 1.020 0.270 - 4.200 uIU/mL Final     Free T4   Date Value Ref Range Status   10/05/2020 1.2 0.9 - 1.8 ng/dL Final      Pain Management Panel  More data may exist         Latest Ref Rng & Units 1/17/2023 10/5/2020   Pain Management Panel   Amphetamine, Urine Qual NA - NEGATIVE    Barbiturates Screen, Urine Negative Negative  NEGATIVE    Benzodiazepine Screen, Urine Negative Negative  NEGATIVE    Cocaine Screen, Urine Negative Negative  NEGATIVE    Methadone Screen , Urine Negative Negative  NEGATIVE       Imaging Results:  No Images in the past 120 days found..  Current Medications:   Current Outpatient Medications   Medication Sig Dispense Refill    Acetaminophen (TYLENOL 8 HOUR PO) Take  by mouth. AS NEEDED      cyclobenzaprine (FLEXERIL) 5 MG tablet TAKE 1 TABLET BY MOUTH DAILY AS NEEDED FOR MUSCLE SPASMS. 30 tablet 0    Methylcobalamin (B12-ACTIVE PO) Take  by mouth. OTC GUMMIES + TABLETS      multivitamin (THERAGRAN) tablet tablet Take   by mouth Daily.      naproxen (NAPROSYN) 500 MG tablet TAKE 1 TABLET BY MOUTH TWICE A DAY WITH FOOD 60 tablet 1    solifenacin (VESICARE) 5 MG tablet Take 1 tablet by mouth Daily. 90 tablet 4    Cariprazine HCl (Vraylar) 1.5 MG capsule capsule Take 1 capsule by mouth Daily. 30 capsule 1    Cariprazine HCl (Vraylar) 1.5 MG capsule capsule Take 1 capsule by mouth Daily. 7 capsule 0     No current facility-administered medications for this visit.     Problem List:  Patient Active Problem List   Diagnosis    Major depressive disorder, recurrent episode, moderate degree    PTSD (post-traumatic stress disorder)    Borderline personality disorder    Allergic rhinitis    Depression    Migraine without aura    Urge incontinence    Urinary incontinence    Annual physical exam    Bladder spasm    Body mass index (BMI) of 25.0-25.9 in adult    Routine health maintenance    Balance disorder    Lightheaded    Anxiety    Shingles    Degeneration of lumbar intervertebral disc    Lumbar radiculopathy    Lumbar spondylosis    Overweight     Allergy:   Allergies   Allergen Reactions    Codeine Seizure     Other reaction(s): Seizure    Prednisone Anaphylaxis      Discontinued Medications:  Medications Discontinued During This Encounter   Medication Reason    lurasidone (LATUDA) 40 MG tablet tablet Alternate therapy    lamoTRIgine (LaMICtal) 100 MG tablet Alternate therapy       PLAN:   Presentation seems most consistent with DSM-V criteria for:  Diagnoses and all orders for this visit:    1. Bipolar disorder with moderate depression (Primary)  -     Cariprazine HCl (Vraylar) 1.5 MG capsule capsule; Take 1 capsule by mouth Daily.  Dispense: 30 capsule; Refill: 1  -     Cariprazine HCl (Vraylar) 1.5 MG capsule capsule; Take 1 capsule by mouth Daily.  Dispense: 7 capsule; Refill: 0    2. Generalized anxiety disorder  -     Cariprazine HCl (Vraylar) 1.5 MG capsule capsule; Take 1 capsule by mouth Daily.  Dispense: 30 capsule; Refill: 1  -      Cariprazine HCl (Vraylar) 1.5 MG capsule capsule; Take 1 capsule by mouth Daily.  Dispense: 7 capsule; Refill: 0    3. Post traumatic stress disorder (PTSD)  -     Cariprazine HCl (Vraylar) 1.5 MG capsule capsule; Take 1 capsule by mouth Daily.  Dispense: 30 capsule; Refill: 1  -     Cariprazine HCl (Vraylar) 1.5 MG capsule capsule; Take 1 capsule by mouth Daily.  Dispense: 7 capsule; Refill: 0    4. Borderline personality disorder    5. Insomnia, unspecified type     Start vraylar 1.5 mg daily  Discontinue Latuda   Continue Lamictal 100 mg daily  Follow-up 6 weeks  Medication Education:   VRAYLAR (CARIPRAZINE) Risks, benefits, alternatives discussed with patient including nausea and vomiting, GI upset, sedation, akathisia, theoretical risk of tardive dyskinesia, and weight gain. After discussion of these risks and benefits, the patient voiced understanding and agreed to proceed.  LAMICTAL (LAMOTRIGINE) Risks, benefits, alternatives discussed with patient including rash, rebound depressive or manic symptoms if prompt discontinuation, GI upset, agitation, sedation/falls risk.  After discussion of these risks and benefits, patient voiced understanding and agreed to proceed.  Medications:   New Medications Ordered This Visit   Medications    Cariprazine HCl (Vraylar) 1.5 MG capsule capsule     Sig: Take 1 capsule by mouth Daily.     Dispense:  30 capsule     Refill:  1    Cariprazine HCl (Vraylar) 1.5 MG capsule capsule     Sig: Take 1 capsule by mouth Daily.     Dispense:  7 capsule     Refill:  0      JENI reviewed.   Discussed medication options and treatment plan of prescribed medication as well as the risks, benefits, and side effects.  Patient is agreeable to call the office with any worsening of symptoms or onset of side effects.   Patient is agreeable to call 911 or go to the nearest ER should he/she begin having SI/HI.   Patient acknowledged, is agreeable to continue with current treatment plan, and was  educated on the importance of compliance with treatment and follow-up appointments.  Addressed all questions and concerns.     Psychotherapy:      Psychotherapy time 40 minutes.  This time is exclusive to the therapy session and separate from the time spent on activities used to meet the criteria for the E/M service (history, exam, medical decision-making).  Goal is to strengthen defenses, promote problems solving, restore adaptive functioning, and provide symptom relief. Esteem building was enhanced through praise, reassurance, normalizing and encouragement. Coping skills were enhanced to build distress tolerance skills and emotional regulation. Allowed patient to freely discuss issues without interruption or judgement with unconditional positive regard, active listening skills, and empathy. Provided a safe, confidential environment to facilitate the development of a positive therapeutic relationship and encourage open, honest communication. Assisted patient in processing session content, acknowledged and normalized patient’s thoughts, feelings, and concerns by utilizing a person-centered approach in efforts to build appropriate rapport and a positive therapeutic relationship with open and honest communication. Plan to continue supportive psychotherapy in next appointment to provide symptom relief.    Functional status:Good  Prognosis: Good  Progress: Continued improvement    Safety/Risk Assessment: Risk of self-harm acutely and chronically is moderate.    Risk factors include anxiety disorder, mood disorder, and recent psychosocial stressors.   Protective factors include no family history, denies access to guns/weapons, no present SI, no history of suicide attempts or self-harm in the past, minimal AODA, healthcare seeking, future orientation, willingness to engage in care.    Risk assessment could be further elevated in the event of treatment noncompliance and/or AODA.    Follow-up: Return in about 6 weeks  (around 7/2/2024) for Next scheduled follow up.     This document has been electronically signed by ERIK Pacheco  June 4, 2024 08:15 EDT  Please note that portions of this note were completed with a voice recognition program.  Copied text in this note has been reviewed and is accurate as of 06/04/24

## 2024-07-15 ENCOUNTER — HOSPITAL ENCOUNTER (OUTPATIENT)
Dept: MAMMOGRAPHY | Facility: HOSPITAL | Age: 44
Discharge: HOME | End: 2024-07-15
Admitting: PHYSICIAN ASSISTANT
Payer: COMMERCIAL

## 2024-07-15 DIAGNOSIS — Z12.31 SCREENING MAMMOGRAM FOR BREAST CANCER: ICD-10-CM

## 2024-07-15 PROCEDURE — 77063 BREAST TOMOSYNTHESIS BI: CPT

## 2024-07-15 PROCEDURE — 77067 SCR MAMMO BI INCL CAD: CPT

## 2024-07-15 NOTE — PROGRESS NOTES
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      Reviewed office visit note from 10/10/19, pt reports that treatment is going well since she has restarted Lithium.     Reviewed office visit note from 2/26/19, pt reports good control of anxiety with Wellbutrin. Pt getting Abilify injections, which was required by court.    I have thoroughly reviewed the patient's electronic medical record to include previous encounters, notes, medications, labs, imaging, and EKG's.  Pertinent information is included in this note.    Psychiatric/Behavioral Health History  Began Treatment: Patient started treatment when she was 15 years old, which she was seen at [facility].   Diagnoses: Patient reports being diagnosed with bipolar disorder when she was 15 years old, though notes that she does not think she actually has this.  History of MDD, AUGUST, panic disorder.  She is currently seeing a therapist who thinks she has borderline personality disorder.  Patient also reports being diagnosed with schizophrenia in the past when she was hospitalized, but does not think she has this either.  Psychiatrist: Patient has seen several psychiatrists in the past, Dr. Gibbons, Evie Farnsworth, and others that were at [facility]. She most recently was seen by Marita VALENCIA.   Therapist: Pt has seen therapist in the past. She recently  started seeing Pooja at [facility] 9/2021.   Admission History: Patient reports being admitted to [facility] twice when she was a teenager and also once at [facility] in 2000.  Patient reports all admissions were due to suicide attempt of overdosing on pills.  Medications/Treatment: Patient reports being on many different medications and is unable to recall them.  She was previously on lithium (not helping symptoms), Prozac (nausea), Paxil (headache), Geodon, trazodone, Celexa, Abilify, Seroquel, risperidone, lamictal, latuda  Self Harm: History of self-harm with punching a mirror and using broken glass to cut wrists.  Last self-harm was in 2012.  Suicide Attempts: History of suicide attempt with overdosing on pills x3.    Postpartum depression: Pt reports being diagnosed with postpartum depression with both of her daughters.    Safety/Risk Assessment: Risk of self-harm acutely and chronically is moderate to severe.    Static/Dynamic risk factors include diagnosis of mental disorder, psychosocial stressors,Previous self-harm, Previous suicide attempt, Recent stressor or loss, Poor impulse control, and Social factors.      MENTAL STATUS EXAM   General Appearance:  Cleanly groomed and dressed and well developed  Eye Contact:  Good eye contact  Attitude:  Cooperative and polite  Motor Activity:  Normal gait, posture  Speech:  Normal rate, tone, volume  Mood and affect:  Normal, pleasant and euthymic  Hopelessness:  Denies  Thought Process:  Logical and goal-directed  Associations/ Thought Content:  No delusions  Hallucinations:  None  Suicidal Ideations:  Not present  Homicidal Ideation:  Not present  Sensorium:  Alert  Orientation:  Person, place, time and situation  Immediate Recall, Recent, and Remote Memory:  Intact  Attention Span/ Concentration:  Good  Fund of Knowledge:  Appropriate for age and educational level  Intellectual Functioning:  Average range  Insight:  Fair  Judgement:  Fair  Reliability:   Fair  Impulse Control:  Poor     PHQ-9 Depression Screening  PHQ-9 Total Score: 12    Little interest or pleasure in doing things? 1-->several days   Feeling down, depressed, or hopeless? 3-->nearly every day   Trouble falling or staying asleep, or sleeping too much? 1-->several days   Feeling tired or having little energy? 2-->more than half the days   Poor appetite or overeating? 0-->not at all   Feeling bad about yourself - or that you are a failure or have let yourself or your family down? 3-->nearly every day   Trouble concentrating on things, such as reading the newspaper or watching television? 0-->not at all   Moving or speaking so slowly that other people could have noticed? Or the opposite - being so fidgety or restless that you have been moving around a lot more than usual? 2-->more than half the days   Thoughts that you would be better off dead, or of hurting yourself in some way? 0-->not at all   PHQ-9 Total Score 12       Review of systems is negative except as noted in HPI.  Labs:  WBC   Date Value Ref Range Status   12/13/2023 8.26 3.40 - 10.80 10*3/mm3 Final     Platelets   Date Value Ref Range Status   12/13/2023 285 140 - 450 10*3/mm3 Final     Hemoglobin   Date Value Ref Range Status   12/13/2023 12.9 12.0 - 15.9 g/dL Final     Hematocrit   Date Value Ref Range Status   12/13/2023 39.6 34.0 - 46.6 % Final     Glucose   Date Value Ref Range Status   12/13/2023 87 65 - 99 mg/dL Final     Creatinine   Date Value Ref Range Status   12/13/2023 0.68 0.57 - 1.00 mg/dL Final     ALT (SGPT)   Date Value Ref Range Status   12/13/2023 14 1 - 33 U/L Final     AST (SGOT)   Date Value Ref Range Status   12/13/2023 18 1 - 32 U/L Final     BUN   Date Value Ref Range Status   12/13/2023 12 6 - 20 mg/dL Final     eGFR   Date Value Ref Range Status   12/13/2023 110.3 >60.0 mL/min/1.73 Final     Total Cholesterol   Date Value Ref Range Status   08/23/2023 174 0 - 200 mg/dL Final     Triglycerides   Date Value Ref  Range Status   08/23/2023 95 0 - 150 mg/dL Final     HDL Cholesterol   Date Value Ref Range Status   08/23/2023 58 40 - 60 mg/dL Final     LDL Cholesterol    Date Value Ref Range Status   08/23/2023 99 0 - 100 mg/dL Final     VLDL Cholesterol   Date Value Ref Range Status   08/23/2023 17 5 - 40 mg/dL Final     LDL/HDL Ratio   Date Value Ref Range Status   08/23/2023 1.67  Final     Hemoglobin A1C   Date Value Ref Range Status   10/17/2022 5.50 4.80 - 5.60 % Final     TSH   Date Value Ref Range Status   08/23/2023 1.020 0.270 - 4.200 uIU/mL Final     Free T4   Date Value Ref Range Status   10/05/2020 1.2 0.9 - 1.8 ng/dL Final      Pain Management Panel  More data may exist         Latest Ref Rng & Units 1/17/2023 10/5/2020   Pain Management Panel   Amphetamine, Urine Qual NA - NEGATIVE    Barbiturates Screen, Urine Negative Negative  NEGATIVE    Benzodiazepine Screen, Urine Negative Negative  NEGATIVE    Cocaine Screen, Urine Negative Negative  NEGATIVE    Methadone Screen , Urine Negative Negative  NEGATIVE       Details                  Imaging Results:  No Images in the past 120 days found..  Current Medications:   Current Outpatient Medications   Medication Sig Dispense Refill    Acetaminophen (TYLENOL 8 HOUR PO) Take  by mouth. AS NEEDED      Cariprazine HCl (Vraylar) 1.5 MG capsule capsule Take 1 capsule by mouth Daily. 30 capsule 1    cyclobenzaprine (FLEXERIL) 5 MG tablet TAKE 1 TABLET BY MOUTH DAILY AS NEEDED FOR MUSCLE SPASMS. 30 tablet 0    Methylcobalamin (B12-ACTIVE PO) Take  by mouth. OTC GUMMIES + TABLETS      multivitamin (THERAGRAN) tablet tablet Take  by mouth Daily.      naproxen (NAPROSYN) 500 MG tablet TAKE 1 TABLET BY MOUTH TWICE A DAY WITH FOOD 60 tablet 1    solifenacin (VESICARE) 5 MG tablet Take 1 tablet by mouth Daily. 90 tablet 4    lamoTRIgine (LaMICtal) 25 MG tablet Take 1 tablet by mouth Daily. 30 tablet 2     No current facility-administered medications for this visit.     Problem  List:  Patient Active Problem List   Diagnosis    Major depressive disorder, recurrent episode, moderate degree    PTSD (post-traumatic stress disorder)    Borderline personality disorder    Allergic rhinitis    Depression    Migraine without aura    Urge incontinence    Urinary incontinence    Annual physical exam    Bladder spasm    Body mass index (BMI) of 25.0-25.9 in adult    Routine health maintenance    Balance disorder    Lightheaded    Anxiety    Shingles    Degeneration of lumbar intervertebral disc    Lumbar radiculopathy    Lumbar spondylosis    Overweight     Allergy:   Allergies   Allergen Reactions    Codeine Seizure     Other reaction(s): Seizure    Prednisone Anaphylaxis      Discontinued Medications:  Medications Discontinued During This Encounter   Medication Reason    Cariprazine HCl (Vraylar) 1.5 MG capsule capsule Reorder       PLAN:   Presentation seems most consistent with DSM-V criteria for:  Diagnoses and all orders for this visit:    1. Bipolar disorder with moderate depression (Primary)  -     lamoTRIgine (LaMICtal) 25 MG tablet; Take 1 tablet by mouth Daily.  Dispense: 30 tablet; Refill: 2  -     Cariprazine HCl (Vraylar) 1.5 MG capsule capsule; Take 1 capsule by mouth Daily.  Dispense: 30 capsule; Refill: 1    2. Generalized anxiety disorder  -     lamoTRIgine (LaMICtal) 25 MG tablet; Take 1 tablet by mouth Daily.  Dispense: 30 tablet; Refill: 2  -     Cariprazine HCl (Vraylar) 1.5 MG capsule capsule; Take 1 capsule by mouth Daily.  Dispense: 30 capsule; Refill: 1    3. Post traumatic stress disorder (PTSD)  -     lamoTRIgine (LaMICtal) 25 MG tablet; Take 1 tablet by mouth Daily.  Dispense: 30 tablet; Refill: 2  -     Cariprazine HCl (Vraylar) 1.5 MG capsule capsule; Take 1 capsule by mouth Daily.  Dispense: 30 capsule; Refill: 1    4. Borderline personality disorder  -     lamoTRIgine (LaMICtal) 25 MG tablet; Take 1 tablet by mouth Daily.  Dispense: 30 tablet; Refill: 2    5. Insomnia,  unspecified type       Continue vraylar 1.5 mg daily  Start Lamictal 25 mg daily  Follow-up 6 weeks  Medication Education:   VRAYLAR (CARIPRAZINE) Risks, benefits, alternatives discussed with patient including nausea and vomiting, GI upset, sedation, akathisia, theoretical risk of tardive dyskinesia, and weight gain. After discussion of these risks and benefits, the patient voiced understanding and agreed to proceed.  LAMICTAL (LAMOTRIGINE) Risks, benefits, alternatives discussed with patient including rash, rebound depressive or manic symptoms if prompt discontinuation, GI upset, agitation, sedation/falls risk.  After discussion of these risks and benefits, patient voiced understanding and agreed to proceed.  Medications:   New Medications Ordered This Visit   Medications    lamoTRIgine (LaMICtal) 25 MG tablet     Sig: Take 1 tablet by mouth Daily.     Dispense:  30 tablet     Refill:  2    Cariprazine HCl (Vraylar) 1.5 MG capsule capsule     Sig: Take 1 capsule by mouth Daily.     Dispense:  30 capsule     Refill:  1      JENI reviewed.   Discussed medication options and treatment plan of prescribed medication as well as the risks, benefits, and side effects.  Patient is agreeable to call the office with any worsening of symptoms or onset of side effects.   Patient is agreeable to call 911 or go to the nearest ER should he/she begin having SI/HI.   Patient acknowledged, is agreeable to continue with current treatment plan, and was educated on the importance of compliance with treatment and follow-up appointments.  Addressed all questions and concerns.     Psychotherapy:      Psychotherapy time 40 minutes.  This time is exclusive to the therapy session and separate from the time spent on activities used to meet the criteria for the E/M service (history, exam, medical decision-making).  Goal is to strengthen defenses, promote problems solving, restore adaptive functioning, and provide symptom relief. Esteem  building was enhanced through praise, reassurance, normalizing and encouragement. Coping skills were enhanced to build distress tolerance skills and emotional regulation. Allowed patient to freely discuss issues without interruption or judgement with unconditional positive regard, active listening skills, and empathy. Provided a safe, confidential environment to facilitate the development of a positive therapeutic relationship and encourage open, honest communication. Assisted patient in processing session content, acknowledged and normalized patient’s thoughts, feelings, and concerns by utilizing a person-centered approach in efforts to build appropriate rapport and a positive therapeutic relationship with open and honest communication. Plan to continue supportive psychotherapy in next appointment to provide symptom relief.    Functional status: Moderate symptoms OR moderate difficulty in social occupational or social functioning (51-60)  Prognosis: Fair with expectation for some response to treatment  Progress: waxing and waning but better overall      Follow-up: Return in about 6 weeks (around 8/27/2024).     This document has been electronically signed by ERIK Pacheco  July 29, 2024 21:06 EDT  Please note that portions of this note were completed with a voice recognition program.  Copied text in this note has been reviewed and is accurate as of 07/29/24

## 2024-07-16 ENCOUNTER — OFFICE VISIT (OUTPATIENT)
Dept: BEHAVIORAL HEALTH | Facility: CLINIC | Age: 44
End: 2024-07-16
Payer: COMMERCIAL

## 2024-07-16 VITALS
HEIGHT: 71 IN | SYSTOLIC BLOOD PRESSURE: 106 MMHG | BODY MASS INDEX: 26.88 KG/M2 | WEIGHT: 192 LBS | DIASTOLIC BLOOD PRESSURE: 72 MMHG

## 2024-07-16 DIAGNOSIS — F60.3 BORDERLINE PERSONALITY DISORDER: ICD-10-CM

## 2024-07-16 DIAGNOSIS — F31.32 BIPOLAR DISORDER WITH MODERATE DEPRESSION: Primary | ICD-10-CM

## 2024-07-16 DIAGNOSIS — F43.10 POST TRAUMATIC STRESS DISORDER (PTSD): ICD-10-CM

## 2024-07-16 DIAGNOSIS — G47.00 INSOMNIA, UNSPECIFIED TYPE: ICD-10-CM

## 2024-07-16 DIAGNOSIS — F41.1 GENERALIZED ANXIETY DISORDER: ICD-10-CM

## 2024-07-16 PROCEDURE — 1159F MED LIST DOCD IN RCRD: CPT

## 2024-07-16 PROCEDURE — 90836 PSYTX W PT W E/M 45 MIN: CPT

## 2024-07-16 PROCEDURE — 99214 OFFICE O/P EST MOD 30 MIN: CPT

## 2024-07-16 PROCEDURE — 1160F RVW MEDS BY RX/DR IN RCRD: CPT

## 2024-07-16 RX ORDER — LAMOTRIGINE 25 MG/1
25 TABLET ORAL DAILY
Qty: 30 TABLET | Refills: 2 | Status: SHIPPED | OUTPATIENT
Start: 2024-07-16 | End: 2025-07-16

## 2024-07-16 NOTE — PATIENT INSTRUCTIONS
1.  Please return to clinic at your next scheduled visit.  Please contact the clinic ([phone]) at least 24 hours prior in the event you need to cancel.  2.  Do no harm to yourself or others.    3.  Avoid alcohol and drugs.    4.  Take all medications as prescribed.  Please contact the clinic with any concerns. If you are in need of medication refills, please call the clinic at [phone].    5. Should you want to get in touch with your provider, ERIK Pacheco, please contact the office ([phone]), and staff will be able to page Janine directly.  6. In the event you have personal crisis, contact the following crisis numbers: Suicide Prevention Hotline 1-965.516.4480; [facility] Helpline 0-413-724-TXZB; [facility] Room [phone]; text HELLO to 473525; or 835.     SPECIFIC RECOMMENDATIONS:     1.      Medications discussed at this encounter:     New Medications Ordered This Visit   Medications    lamoTRIgine (LaMICtal) 25 MG tablet     Sig: Take 1 tablet by mouth Daily.     Dispense:  30 tablet     Refill:  2    Cariprazine HCl (Vraylar) 1.5 MG capsule capsule     Sig: Take 1 capsule by mouth Daily.     Dispense:  30 capsule     Refill:  1                       2.      Psychotherapy recommendations: We will continue therapy at future visits.     3.     Return to clinic: Return in about 6 weeks (around 8/27/2024).

## 2024-08-02 ENCOUNTER — TELEPHONE (OUTPATIENT)
Dept: INTERNAL MEDICINE | Facility: CLINIC | Age: 44
End: 2024-08-02
Payer: COMMERCIAL

## 2024-08-02 NOTE — TELEPHONE ENCOUNTER
Caller: ADRIANE FROM [facility]    Relationship to patient: Other    Best call back number: [phone]     Patient is needing: CALLER REACHING OUT TO OFFICE IN REGARDS TO [employer] ORDER FAXED TO THEM FOR PATIENT'S INCONTINENCE SUPPLY. CALLER STATES THAT FORM SENT WAS MISSING ALL PHYSICIAN SIGNATURES AND DATES, ALONG WITH A BLANK CERTIFICATE OF MEDICAL NECESSITY. QUESTIONS IN SECTION B NEED TO BE COMPLETED. CALLER REQUESTING FORM BE COMPLETED AND FAXED BACK TO [facility].     FAX: [phone]

## 2024-08-05 NOTE — TELEPHONE ENCOUNTER
Called and spoke with Allyson, explained to Allyson pt hs a Urologist and per PCP , they are going to have to be the ones to fill out the forms, provided Allyson with fax number for Urology, called and spoke with pit, explained to pt I had spoke with Allyson and explained to them Urology would have to fill out forms, also explained to pt I provided Allyson with contact information for Urology, pt verbalized understanding and had no further questions at this time.

## 2024-09-26 ENCOUNTER — HOSPITAL ENCOUNTER (EMERGENCY)
Facility: HOSPITAL | Age: 44
Discharge: HOME | End: 2024-09-26
Attending: EMERGENCY MEDICINE
Payer: COMMERCIAL

## 2024-09-26 ENCOUNTER — APPOINTMENT (OUTPATIENT)
Dept: GENERAL RADIOLOGY | Facility: HOSPITAL | Age: 44
End: 2024-09-26
Payer: COMMERCIAL

## 2024-09-26 VITALS
DIASTOLIC BLOOD PRESSURE: 86 MMHG | HEIGHT: 71 IN | WEIGHT: 201.28 LBS | BODY MASS INDEX: 28.18 KG/M2 | HEART RATE: 82 BPM | OXYGEN SATURATION: 98 % | TEMPERATURE: 98.7 F | RESPIRATION RATE: 20 BRPM | SYSTOLIC BLOOD PRESSURE: 114 MMHG

## 2024-09-26 DIAGNOSIS — G43.809 OTHER MIGRAINE WITHOUT STATUS MIGRAINOSUS, NOT INTRACTABLE: Primary | ICD-10-CM

## 2024-09-26 DIAGNOSIS — K08.89 PAIN, DENTAL: ICD-10-CM

## 2024-09-26 PROCEDURE — 99283 EMERGENCY DEPT VISIT LOW MDM: CPT

## 2024-09-26 PROCEDURE — 96374 THER/PROPH/DIAG INJ IV PUSH: CPT

## 2024-09-26 PROCEDURE — 25010000002 METOCLOPRAMIDE PER 10 MG

## 2024-09-26 PROCEDURE — 25010000002 KETOROLAC TROMETHAMINE PER 15 MG

## 2024-09-26 PROCEDURE — 73562 X-RAY EXAM OF KNEE 3: CPT

## 2024-09-26 PROCEDURE — 25010000002 DIPHENHYDRAMINE PER 50 MG

## 2024-09-26 PROCEDURE — 25810000003 SODIUM CHLORIDE 0.9 % SOLUTION

## 2024-09-26 PROCEDURE — 96375 TX/PRO/DX INJ NEW DRUG ADDON: CPT

## 2024-09-26 RX ORDER — DIPHENHYDRAMINE HYDROCHLORIDE 50 MG/ML
25 INJECTION INTRAMUSCULAR; INTRAVENOUS ONCE
Status: COMPLETED | OUTPATIENT
Start: 2024-09-26 | End: 2024-09-26

## 2024-09-26 RX ORDER — KETOROLAC TROMETHAMINE 30 MG/ML
30 INJECTION, SOLUTION INTRAMUSCULAR; INTRAVENOUS ONCE
Status: COMPLETED | OUTPATIENT
Start: 2024-09-26 | End: 2024-09-26

## 2024-09-26 RX ORDER — ONDANSETRON 4 MG/1
4 TABLET, ORALLY DISINTEGRATING ORAL EVERY 8 HOURS PRN
Qty: 10 TABLET | Refills: 0 | Status: SHIPPED | OUTPATIENT
Start: 2024-09-26

## 2024-09-26 RX ORDER — KETOROLAC TROMETHAMINE 10 MG/1
10 TABLET, FILM COATED ORAL EVERY 6 HOURS PRN
Qty: 15 TABLET | Refills: 0 | Status: SHIPPED | OUTPATIENT
Start: 2024-09-26

## 2024-09-26 RX ORDER — METOCLOPRAMIDE HYDROCHLORIDE 5 MG/ML
10 INJECTION INTRAMUSCULAR; INTRAVENOUS ONCE
Status: COMPLETED | OUTPATIENT
Start: 2024-09-26 | End: 2024-09-26

## 2024-09-26 RX ADMIN — DIPHENHYDRAMINE HYDROCHLORIDE 25 MG: 50 INJECTION, SOLUTION INTRAMUSCULAR; INTRAVENOUS at 22:21

## 2024-09-26 RX ADMIN — METOCLOPRAMIDE 10 MG: 5 INJECTION, SOLUTION INTRAMUSCULAR; INTRAVENOUS at 22:23

## 2024-09-26 RX ADMIN — KETOROLAC TROMETHAMINE 30 MG: 30 INJECTION, SOLUTION INTRAMUSCULAR; INTRAVENOUS at 22:20

## 2024-09-26 RX ADMIN — SODIUM CHLORIDE 500 ML: 9 INJECTION, SOLUTION INTRAVENOUS at 22:18

## 2024-09-27 NOTE — DISCHARGE INSTRUCTIONS
I have sent Toradol for pain, please do not take in addition to ibuprofen.  You can alternate with Tylenol  Also sent Zofran for nausea and vomiting  I have given you contact information for [facility] [employer] [facility] to call them to see if can get in sooner.
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
## 2024-10-09 DIAGNOSIS — F60.3 BORDERLINE PERSONALITY DISORDER: ICD-10-CM

## 2024-10-09 DIAGNOSIS — F31.32 BIPOLAR DISORDER WITH MODERATE DEPRESSION: ICD-10-CM

## 2024-10-09 DIAGNOSIS — F43.10 POST TRAUMATIC STRESS DISORDER (PTSD): ICD-10-CM

## 2024-10-09 DIAGNOSIS — F41.1 GENERALIZED ANXIETY DISORDER: ICD-10-CM

## 2024-10-09 RX ORDER — LAMOTRIGINE 25 MG/1
25 TABLET ORAL DAILY
Qty: 90 TABLET | Refills: 1 | Status: SHIPPED | OUTPATIENT
Start: 2024-10-09

## 2024-10-09 NOTE — TELEPHONE ENCOUNTER
"REFILL REQUEST FROM THE [facility] OR PHARMACY FOR PTS:     lamoTRIgine (LaMICtal) 25 MG tablet (07/16/2024)     FOLLOW UP APPT- 10/14/2024 (pt canceled appt due to work)    PT LAST SEEN ON- 07/16/2024.    PTS APPT ON 09/04/2024 PT CANCELED STATING THAT \"MY HEALTH INSURANCE WILL BE CANCELED 08/31/2024)   "

## 2024-10-21 DIAGNOSIS — F43.10 POST TRAUMATIC STRESS DISORDER (PTSD): ICD-10-CM

## 2024-10-21 DIAGNOSIS — F31.32 BIPOLAR DISORDER WITH MODERATE DEPRESSION: ICD-10-CM

## 2024-10-21 DIAGNOSIS — F41.1 GENERALIZED ANXIETY DISORDER: ICD-10-CM

## 2024-10-21 RX ORDER — CARIPRAZINE 1.5 MG/1
1.5 CAPSULE, GELATIN COATED ORAL DAILY
Qty: 30 CAPSULE | Refills: 1 | Status: SHIPPED | OUTPATIENT
Start: 2024-10-21

## 2024-10-21 NOTE — TELEPHONE ENCOUNTER
NEXT VISIT WITH PROVIDER: No upcoming visit scheduled     LAST SEEN BY PROVIDER Office Visit with Janine Hummel APRN (07/16/2024)     Patient cancelled last two visits    LAST MED REFILLCariprazine HCl (Vraylar) 1.5 MG capsule capsule (07/16/2024)       PROVIDER PLEASE ADVISE

## 2024-10-22 ENCOUNTER — TELEPHONE (OUTPATIENT)
Dept: PSYCHIATRY | Facility: CLINIC | Age: 44
End: 2024-10-22
Payer: COMMERCIAL

## 2024-10-22 NOTE — TELEPHONE ENCOUNTER
Attempted to start a pa for the patient's Vraylar        I attempted to call the patient to clarify the necessary information.  No answer. No voicemail option

## 2025-04-13 DIAGNOSIS — F41.1 GENERALIZED ANXIETY DISORDER: ICD-10-CM

## 2025-04-13 DIAGNOSIS — F31.32 BIPOLAR DISORDER WITH MODERATE DEPRESSION: ICD-10-CM

## 2025-04-13 DIAGNOSIS — F43.10 POST TRAUMATIC STRESS DISORDER (PTSD): ICD-10-CM

## 2025-04-13 DIAGNOSIS — F60.3 BORDERLINE PERSONALITY DISORDER: ICD-10-CM

## 2025-04-14 RX ORDER — LAMOTRIGINE 25 MG/1
25 TABLET ORAL DAILY
Qty: 90 TABLET | Refills: 1 | Status: SHIPPED | OUTPATIENT
Start: 2025-04-14

## 2025-04-14 NOTE — TELEPHONE ENCOUNTER
NEXT VISIT WITH PROVIDER Appointment with Janine Hummel APRN (06/04/2025)     LAST SEEN BY PROVIDER Appointment with Janine Hummel APRN (10/14/2024)     LAST MED REFILLlamoTRIgine (LaMICtal) 25 MG tablet (10/09/2024)     PROVIDER PLEASE ADVISE

## 2025-04-23 ENCOUNTER — OFFICE VISIT (OUTPATIENT)
Dept: INTERNAL MEDICINE | Facility: CLINIC | Age: 45
End: 2025-04-23
Payer: COMMERCIAL

## 2025-04-23 VITALS
HEIGHT: 71 IN | DIASTOLIC BLOOD PRESSURE: 80 MMHG | SYSTOLIC BLOOD PRESSURE: 124 MMHG | TEMPERATURE: 97.5 F | WEIGHT: 190.2 LBS | HEART RATE: 90 BPM | OXYGEN SATURATION: 99 % | BODY MASS INDEX: 26.63 KG/M2

## 2025-04-23 DIAGNOSIS — B36.0 TINEA VERSICOLOR: ICD-10-CM

## 2025-04-23 DIAGNOSIS — Z12.11 SCREENING FOR COLON CANCER: ICD-10-CM

## 2025-04-23 DIAGNOSIS — Z00.00 ANNUAL PHYSICAL EXAM: Primary | ICD-10-CM

## 2025-04-23 LAB
ALBUMIN SERPL-MCNC: 4.1 G/DL (ref 3.5–5.2)
ALBUMIN/GLOB SERPL: 1.5 G/DL
ALP SERPL-CCNC: 62 U/L (ref 39–117)
ALT SERPL W P-5'-P-CCNC: 15 U/L (ref 1–33)
ANION GAP SERPL CALCULATED.3IONS-SCNC: 8 MMOL/L (ref 5–15)
AST SERPL-CCNC: 15 U/L (ref 1–32)
BASOPHILS # BLD AUTO: 0.02 10*3/MM3 (ref 0–0.2)
BASOPHILS NFR BLD AUTO: 0.3 % (ref 0–1.5)
BILIRUB SERPL-MCNC: 0.4 MG/DL (ref 0–1.2)
BUN SERPL-MCNC: 7 MG/DL (ref 6–20)
BUN/CREAT SERPL: 10.3 (ref 7–25)
CALCIUM SPEC-SCNC: 9.1 MG/DL (ref 8.6–10.5)
CHLORIDE SERPL-SCNC: 105 MMOL/L (ref 98–107)
CHOLEST SERPL-MCNC: 185 MG/DL (ref 0–200)
CO2 SERPL-SCNC: 24 MMOL/L (ref 22–29)
CREAT SERPL-MCNC: 0.68 MG/DL (ref 0.57–1)
DEPRECATED RDW RBC AUTO: 40.1 FL (ref 37–54)
EGFRCR SERPLBLD CKD-EPI 2021: 109.6 ML/MIN/1.73
EOSINOPHIL # BLD AUTO: 0.04 10*3/MM3 (ref 0–0.4)
EOSINOPHIL NFR BLD AUTO: 0.6 % (ref 0.3–6.2)
ERYTHROCYTE [DISTWIDTH] IN BLOOD BY AUTOMATED COUNT: 12.1 % (ref 12.3–15.4)
GLOBULIN UR ELPH-MCNC: 2.8 GM/DL
GLUCOSE SERPL-MCNC: 100 MG/DL (ref 65–99)
HCT VFR BLD AUTO: 39.8 % (ref 34–46.6)
HDLC SERPL-MCNC: 49 MG/DL (ref 40–60)
HGB BLD-MCNC: 13.5 G/DL (ref 12–15.9)
IMM GRANULOCYTES # BLD AUTO: 0.01 10*3/MM3 (ref 0–0.05)
IMM GRANULOCYTES NFR BLD AUTO: 0.2 % (ref 0–0.5)
LDLC SERPL CALC-MCNC: 118 MG/DL (ref 0–100)
LDLC/HDLC SERPL: 2.37 {RATIO}
LYMPHOCYTES # BLD AUTO: 2.03 10*3/MM3 (ref 0.7–3.1)
LYMPHOCYTES NFR BLD AUTO: 32.4 % (ref 19.6–45.3)
MCH RBC QN AUTO: 30.7 PG (ref 26.6–33)
MCHC RBC AUTO-ENTMCNC: 33.9 G/DL (ref 31.5–35.7)
MCV RBC AUTO: 90.5 FL (ref 79–97)
MONOCYTES # BLD AUTO: 0.51 10*3/MM3 (ref 0.1–0.9)
MONOCYTES NFR BLD AUTO: 8.1 % (ref 5–12)
NEUTROPHILS NFR BLD AUTO: 3.66 10*3/MM3 (ref 1.7–7)
NEUTROPHILS NFR BLD AUTO: 58.4 % (ref 42.7–76)
NRBC BLD AUTO-RTO: 0 /100 WBC (ref 0–0.2)
PLATELET # BLD AUTO: 242 10*3/MM3 (ref 140–450)
PMV BLD AUTO: 10.8 FL (ref 6–12)
POTASSIUM SERPL-SCNC: 4.4 MMOL/L (ref 3.5–5.2)
PROT SERPL-MCNC: 6.9 G/DL (ref 6–8.5)
RBC # BLD AUTO: 4.4 10*6/MM3 (ref 3.77–5.28)
SODIUM SERPL-SCNC: 137 MMOL/L (ref 136–145)
TRIGL SERPL-MCNC: 100 MG/DL (ref 0–150)
TSH SERPL DL<=0.05 MIU/L-ACNC: 0.65 UIU/ML (ref 0.27–4.2)
VLDLC SERPL-MCNC: 18 MG/DL (ref 5–40)
WBC NRBC COR # BLD AUTO: 6.27 10*3/MM3 (ref 3.4–10.8)

## 2025-04-23 PROCEDURE — 80050 GENERAL HEALTH PANEL: CPT | Performed by: PHYSICIAN ASSISTANT

## 2025-04-23 PROCEDURE — 80061 LIPID PANEL: CPT | Performed by: PHYSICIAN ASSISTANT

## 2025-04-23 NOTE — ASSESSMENT & PLAN NOTE
Discussed ddx. Appears to be fungal, would recommend washing with head and shoulders. Will monitor for improvement. Pt will let us know if sx worsen/change.
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
## 2025-05-20 ENCOUNTER — OFFICE VISIT (OUTPATIENT)
Dept: UROLOGY | Age: 45
End: 2025-05-20
Payer: COMMERCIAL

## 2025-05-20 VITALS — RESPIRATION RATE: 16 BRPM | WEIGHT: 190.26 LBS | HEIGHT: 71 IN | BODY MASS INDEX: 26.64 KG/M2

## 2025-05-20 DIAGNOSIS — N39.3 STRESS INCONTINENCE: ICD-10-CM

## 2025-05-20 DIAGNOSIS — R30.0 DYSURIA: Primary | ICD-10-CM

## 2025-05-20 DIAGNOSIS — N39.41 URGE INCONTINENCE: ICD-10-CM

## 2025-05-20 PROBLEM — B02.9 SHINGLES: Status: RESOLVED | Noted: 2023-05-16 | Resolved: 2025-05-20

## 2025-05-20 PROBLEM — Z00.00 ROUTINE HEALTH MAINTENANCE: Status: RESOLVED | Noted: 2022-09-06 | Resolved: 2025-05-20

## 2025-05-20 PROBLEM — R26.89 BALANCE DISORDER: Status: RESOLVED | Noted: 2022-10-17 | Resolved: 2025-05-20

## 2025-05-20 PROBLEM — B36.0 TINEA VERSICOLOR: Status: RESOLVED | Noted: 2025-04-23 | Resolved: 2025-05-20

## 2025-05-20 PROBLEM — R42 LIGHTHEADED: Status: RESOLVED | Noted: 2023-01-12 | Resolved: 2025-05-20

## 2025-05-20 LAB
BILIRUB BLD-MCNC: NEGATIVE MG/DL
CLARITY, POC: CLEAR
COLOR UR: YELLOW
EXPIRATION DATE: ABNORMAL
GLUCOSE UR STRIP-MCNC: NEGATIVE MG/DL
KETONES UR QL: NEGATIVE
LEUKOCYTE EST, POC: ABNORMAL
Lab: ABNORMAL
NITRITE UR-MCNC: NEGATIVE MG/ML
PH UR: 8.5 [PH] (ref 5–8)
PROT UR STRIP-MCNC: ABNORMAL MG/DL
RBC # UR STRIP: NEGATIVE /UL
SP GR UR: 1.01 (ref 1–1.03)
URINE VOLUME: 0
UROBILINOGEN UR QL: ABNORMAL

## 2025-05-20 RX ORDER — SOLIFENACIN SUCCINATE 5 MG/1
5 TABLET, FILM COATED ORAL DAILY
Qty: 90 TABLET | Refills: 4 | Status: SHIPPED | OUTPATIENT
Start: 2025-05-20
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
## 2025-06-04 ENCOUNTER — OFFICE VISIT (OUTPATIENT)
Dept: BEHAVIORAL HEALTH | Facility: CLINIC | Age: 45
End: 2025-06-04
Payer: COMMERCIAL

## 2025-06-04 VITALS
HEART RATE: 82 BPM | DIASTOLIC BLOOD PRESSURE: 84 MMHG | WEIGHT: 188 LBS | BODY MASS INDEX: 26.32 KG/M2 | SYSTOLIC BLOOD PRESSURE: 121 MMHG | HEIGHT: 71 IN

## 2025-06-04 DIAGNOSIS — F43.10 POST TRAUMATIC STRESS DISORDER (PTSD): ICD-10-CM

## 2025-06-04 DIAGNOSIS — F60.3 BORDERLINE PERSONALITY DISORDER: ICD-10-CM

## 2025-06-04 DIAGNOSIS — F41.1 GENERALIZED ANXIETY DISORDER: ICD-10-CM

## 2025-06-04 DIAGNOSIS — F31.32 BIPOLAR DISORDER WITH MODERATE DEPRESSION: Primary | ICD-10-CM

## 2025-06-04 RX ORDER — KETOROLAC TROMETHAMINE 10 MG/1
10 TABLET, FILM COATED ORAL EVERY 6 HOURS PRN
COMMUNITY

## 2025-06-04 NOTE — PATIENT INSTRUCTIONS
1.  Please return to clinic at your next scheduled visit.  Please contact the clinic ([phone]) at least 24 hours prior in the event you need to cancel.  2.  Do no harm to yourself or others.    3.  Avoid alcohol and drugs.    4.  Take all medications as prescribed.  Please contact the clinic with any concerns. If you are in need of medication refills, please call the clinic at [phone].    5. Should you want to get in touch with your provider, ERIK Pacheco, please contact the office ([phone]), and staff will be able to page Janine directly.  6. In the event you have personal crisis, contact the following crisis numbers: Suicide Prevention Hotline 1-155.721.3272; [facility] Helpline 3-810-594-NGGH; [facility] Emergency Room [phone]; text HELLO to 037262; or 660.     SPECIFIC RECOMMENDATIONS:     1.      Medications discussed at this encounter:     No orders of the defined types were placed in this encounter.                      2.      Psychotherapy recommendations: We will continue therapy at future visits.     3.     Return to clinic: Return in about 4 months (around 10/4/2025).

## 2025-06-04 NOTE — PROGRESS NOTES
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 PTSD, and insomnia..  Will continue quetiapine, Wellbutrin XL, Lamictal for management of depression, anxiety, insomnia, and overall mood.  However we will retitrate Lamictal from a low-dose of 25 mg for safety.  I extensively discussed the importance of her contacting the office should she need refills on her medications as it would be unsafe to discontinue them abruptly. Patient verbalized understanding and is agreeable to this plan.  Follow-up in 6 weeks.  Addressed all questions and concerns.  Continue psychotherapeutic modalities as indicated.  Record Review is below for 12/20/2022 : No encounters noted since last visit on 11/15/2022, hemoglobin A1c, TSH, CBC, and CMP reviewed were all reassuring and within normal limits.  Lamotrigine level on 10/17/2022 was 4.2, and within the expected range. 12/23/2020 EKG [id number].   Per Jess Carballo PA-C on 11/15/2022- (Presentation seems to be most consistent with borderline personality disorder, MDD, AUGUST, PTSD, and insomnia.  We will continue Seroquel at current dose to target depression, anxiety, insomnia, PTSD, and overall mood.  We will continue Lamictal at current dose to target borderline personality disorder, depression, anxiety, PTSD, and overall mood.  We will continue Wellbutrin at current " dose to target depression, anxiety, and overall mood.  Recommended for patient to journal.  Continue therapy.  Follow up in 1 month.  Addressed all questions and concerns.   Previous Medical Record Review: Reviewed office visit note from 10/11/21, pt f/u for mood. She recently lost custody of her youngest daughter 2/2021. She has good support from her 22 year old daughter. Pt has been seeing a therapist at [facility]. She had a break down at work and [employer] put her on paid leave. Pt denies SI and HI. She has been seeing Marita VALENCIA at [facility]. She has not been taking prescribed Lithium and Wellbutrin. She has been taking OTC stress meds. Pt reports previously being diagnosed with bipolar but does not think she has that. Pt referred to psych for PTSD, borderline personality.      Reviewed office visit note from 10/10/19, pt reports that treatment is going well since she has restarted Lithium.     Reviewed office visit note from 2/26/19, pt reports good control of anxiety with Wellbutrin. Pt getting Abilify injections, which was required by court.    I have thoroughly reviewed the patient's electronic medical record to include previous encounters, notes, medications, labs, imaging, and EKG's.  Pertinent information is included in this note.    Psychiatric/Behavioral Health History  Began Treatment: Patient started treatment when she was 15 years old, which she was seen at [facility].   Diagnoses: Patient reports being diagnosed with bipolar disorder when she was 15 years old, though notes that she does not think she actually has this.  History of MDD, AUGUST, panic disorder.  She is currently seeing a therapist who thinks she has borderline personality disorder.  Patient also reports being diagnosed with schizophrenia in the past when she was hospitalized, but does not think she has this either.  Psychiatrist: Patient has seen several psychiatrists in the past, Dr. Gibbons, Evie Farnsworth, and  others that were at [facility]. She most recently was seen by Marita VALENCIA.   Therapist: Pt has seen therapist in the past. She recently started seeing Pooja at [facility] 9/2021.   Admission History: Patient reports being admitted to [facility] twice when she was a teenager and also once at [facility] in 2000.  Patient reports all admissions were due to suicide attempt of overdosing on pills.  Medications/Treatment: Patient reports being on many different medications and is unable to recall them.  She was previously on lithium (not helping symptoms), Prozac (nausea), Paxil (headache), Geodon, trazodone, Celexa, Abilify, Seroquel, risperidone, lamictal, latuda  Self Harm: History of self-harm with punching a mirror and using broken glass to cut wrists.  Last self-harm was in 2012.  Suicide Attempts: History of suicide attempt with overdosing on pills x3.    Postpartum depression: Pt reports being diagnosed with postpartum depression with both of her daughters.    Safety/Risk Assessment: Risk of self-harm acutely and chronically is moderate to severe.    Static/Dynamic risk factors include diagnosis of mental disorder, psychosocial stressors,Previous self-harm, Previous suicide attempt, Recent stressor or loss, and Social factors.      MENTAL STATUS EXAM   General Appearance:  Cleanly groomed and dressed and well developed  Eye Contact:  Good eye contact  Attitude:  Cooperative and polite  Motor Activity:  Normal gait, posture  Speech:  Normal rate, tone, volume  Mood and affect:  Normal, pleasant and euthymic  Hopelessness:  Denies  Thought Process:  Logical and goal-directed  Associations/ Thought Content:  No delusions  Hallucinations:  None  Suicidal Ideations:  Not present  Homicidal Ideation:  Not present  Sensorium:  Alert  Orientation:  Person, place, time and situation  Immediate Recall, Recent, and Remote Memory:  Intact  Attention Span/ Concentration:  Good  Fund of Knowledge:  Appropriate  for age and educational level  Intellectual Functioning:  Average range  Insight:  Good  Judgement:  Good  Reliability:  Good  Impulse Control:  Good       Review of systems is negative except as noted in HPI.  Labs:  WBC   Date Value Ref Range Status   04/23/2025 6.27 3.40 - 10.80 10*3/mm3 Final     Platelets   Date Value Ref Range Status   04/23/2025 242 140 - 450 10*3/mm3 Final     Hemoglobin   Date Value Ref Range Status   04/23/2025 13.5 12.0 - 15.9 g/dL Final     Hematocrit   Date Value Ref Range Status   04/23/2025 39.8 34.0 - 46.6 % Final     Glucose   Date Value Ref Range Status   04/23/2025 100 (H) 65 - 99 mg/dL Final     Creatinine   Date Value Ref Range Status   04/23/2025 0.68 0.57 - 1.00 mg/dL Final     ALT (SGPT)   Date Value Ref Range Status   04/23/2025 15 1 - 33 U/L Final     AST (SGOT)   Date Value Ref Range Status   04/23/2025 15 1 - 32 U/L Final     BUN   Date Value Ref Range Status   04/23/2025 7 6 - 20 mg/dL Final     eGFR   Date Value Ref Range Status   04/23/2025 109.6 >60.0 mL/min/1.73 Final     Total Cholesterol   Date Value Ref Range Status   04/23/2025 185 0 - 200 mg/dL Final     Triglycerides   Date Value Ref Range Status   04/23/2025 100 0 - 150 mg/dL Final     HDL Cholesterol   Date Value Ref Range Status   04/23/2025 49 40 - 60 mg/dL Final     LDL Cholesterol    Date Value Ref Range Status   04/23/2025 118 (H) 0 - 100 mg/dL Final     VLDL Cholesterol   Date Value Ref Range Status   04/23/2025 18 5 - 40 mg/dL Final     LDL/HDL Ratio   Date Value Ref Range Status   04/23/2025 2.37  Final     Hemoglobin A1C   Date Value Ref Range Status   10/17/2022 5.50 4.80 - 5.60 % Final     TSH   Date Value Ref Range Status   04/23/2025 0.648 0.270 - 4.200 uIU/mL Final     Free T4   Date Value Ref Range Status   10/05/2020 1.2 0.9 - 1.8 ng/dL Final      Pain Management Panel  More data may exist         Latest Ref Rng & Units 1/17/2023 10/5/2020   Pain Management Panel   Amphetamine, Urine Qual NA -  NEGATIVE    Barbiturates Screen, Urine Negative Negative  NEGATIVE    Benzodiazepine Screen, Urine Negative Negative  NEGATIVE    Cocaine Screen, Urine Negative Negative  NEGATIVE    Methadone Screen , Urine Negative Negative  NEGATIVE       Imaging Results:  No Images in the past 120 days found..  Current Medications:   Current Outpatient Medications   Medication Sig Dispense Refill    Acetaminophen (TYLENOL 8 HOUR PO) Take  by mouth. AS NEEDED      ketorolac (TORADOL) 10 MG tablet Take 1 tablet by mouth Every 6 (Six) Hours As Needed for Moderate Pain.      lamoTRIgine (LaMICtal) 25 MG tablet TAKE 1 TABLET BY MOUTH EVERY DAY 90 tablet 1    Methylcobalamin (B12-ACTIVE PO) Take  by mouth. OTC GUMMIES + TABLETS      multivitamin (THERAGRAN) tablet tablet Take  by mouth Daily.      ondansetron ODT (ZOFRAN-ODT) 4 MG disintegrating tablet Place 1 tablet on the tongue Every 8 (Eight) Hours As Needed for Nausea. 10 tablet 0    solifenacin (VESICARE) 5 MG tablet Take 1 tablet by mouth Daily. 90 tablet 4    Vraylar 1.5 MG capsule capsule TAKE 1 CAPSULE BY MOUTH EVERY DAY 30 capsule 1     No current facility-administered medications for this visit.     Problem List:  Patient Active Problem List   Diagnosis    Major depressive disorder, recurrent episode, moderate degree    PTSD (post-traumatic stress disorder)    Borderline personality disorder    Allergic rhinitis    Depression    Migraine without aura    Urge incontinence    Urinary incontinence    Annual physical exam    Bladder spasm    Anxiety    Degeneration of lumbar intervertebral disc    Lumbar radiculopathy    Lumbar spondylosis    Overweight     Allergy:   Allergies   Allergen Reactions    Codeine Seizure     Other reaction(s): Seizure    Prednisone Anaphylaxis      Discontinued Medications:  There are no discontinued medications.    PLAN:   Presentation meets DSM-V criteria for:  Diagnoses and all orders for this visit:    1. Bipolar disorder with moderate  depression (Primary)    2. Generalized anxiety disorder    3. Post traumatic stress disorder (PTSD)    4. Borderline personality disorder       Continue vraylar 1.5 mg daily  Discontinue Lamictal  Medication Education:   VRAYLAR (CARIPRAZINE) Risks, benefits, alternatives discussed with patient including nausea and vomiting, GI upset, sedation, akathisia, theoretical risk of tardive dyskinesia, and weight gain. After discussion of these risks and benefits, the patient voiced understanding and agreed to proceed.  LAMICTAL (LAMOTRIGINE) Risks, benefits, alternatives discussed with patient including rash, rebound depressive or manic symptoms if prompt discontinuation, GI upset, agitation, sedation/falls risk.  After discussion of these risks and benefits, patient voiced understanding and agreed to proceed.  Medications: No orders of the defined types were placed in this encounter.     JENI reviewed.   Discussed medication options and treatment plan of prescribed medication as well as the risks, benefits, and side effects.  Patient is agreeable to call the office with any worsening of symptoms or onset of side effects.   Patient is agreeable to call 911 or go to the nearest ER should he/she begin having SI/HI.   Patient acknowledged, is agreeable to continue with current treatment plan, and was educated on the importance of compliance with treatment and follow-up appointments.  Addressed all questions and concerns.     Psychotherapy:    Provided minimal supportive therapy.  Functional status: Moderate symptoms OR moderate difficulty in social occupational or social functioning (51-60)  Prognosis: Fair with expectation for some response to treatment  Progress: waxing and waning but better overall      Follow-up: Return in about 4 months (around 10/4/2025).     This document has been electronically signed by ERIK Pacheco  June 18, 2025 09:18 EDT  Please note that portions of this note were completed with a voice  recognition program.  Copied text in this note has been reviewed and is accurate as of 06/18/25

## 2025-06-10 ENCOUNTER — TRANSCRIBE ORDERS (OUTPATIENT)
Dept: ADMINISTRATIVE | Facility: HOSPITAL | Age: 45
End: 2025-06-10
Payer: COMMERCIAL

## 2025-06-10 DIAGNOSIS — Z12.31 ENCOUNTER FOR SCREENING MAMMOGRAM FOR BREAST CANCER: Primary | ICD-10-CM

## 2025-07-24 ENCOUNTER — OFFICE VISIT (OUTPATIENT)
Dept: INTERNAL MEDICINE | Facility: CLINIC | Age: 45
End: 2025-07-24
Payer: COMMERCIAL

## 2025-07-24 VITALS
TEMPERATURE: 98.8 F | WEIGHT: 188.2 LBS | BODY MASS INDEX: 26.35 KG/M2 | RESPIRATION RATE: 18 BRPM | SYSTOLIC BLOOD PRESSURE: 130 MMHG | HEART RATE: 78 BPM | DIASTOLIC BLOOD PRESSURE: 80 MMHG | HEIGHT: 71 IN | OXYGEN SATURATION: 98 %

## 2025-07-24 DIAGNOSIS — R06.02 SHORTNESS OF BREATH: Primary | ICD-10-CM

## 2025-07-24 DIAGNOSIS — J30.1 ALLERGIC RHINITIS DUE TO POLLEN, UNSPECIFIED SEASONALITY: ICD-10-CM

## 2025-07-24 DIAGNOSIS — Z77.22 SECOND HAND SMOKE EXPOSURE: ICD-10-CM

## 2025-07-24 PROCEDURE — 99213 OFFICE O/P EST LOW 20 MIN: CPT | Performed by: PHYSICIAN ASSISTANT

## 2025-07-24 RX ORDER — MONTELUKAST SODIUM 10 MG/1
10 TABLET ORAL NIGHTLY
Qty: 30 TABLET | Refills: 2 | Status: SHIPPED | OUTPATIENT
Start: 2025-07-24

## 2025-07-24 RX ORDER — ALBUTEROL SULFATE 90 UG/1
2 INHALANT RESPIRATORY (INHALATION) EVERY 6 HOURS PRN
COMMUNITY
Start: 2025-07-07

## 2025-07-24 RX ORDER — ERGOCALCIFEROL 1.25 MG/1
50000 CAPSULE, LIQUID FILLED ORAL
COMMUNITY

## 2025-07-24 NOTE — PROGRESS NOTES
I have reviewed the notes, assessments, and/or procedures performed by Elizabeth Doyle PA-C, I concur with her documentation of Cruz Nichols.
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
## 2025-08-13 ENCOUNTER — HOSPITAL ENCOUNTER (OUTPATIENT)
Dept: MAMMOGRAPHY | Facility: HOSPITAL | Age: 45
Discharge: HOME | End: 2025-08-13
Admitting: PHYSICIAN ASSISTANT
Payer: COMMERCIAL

## 2025-08-13 DIAGNOSIS — Z12.31 ENCOUNTER FOR SCREENING MAMMOGRAM FOR BREAST CANCER: ICD-10-CM

## 2025-08-13 PROCEDURE — 77067 SCR MAMMO BI INCL CAD: CPT

## 2025-08-13 PROCEDURE — 77063 BREAST TOMOSYNTHESIS BI: CPT
